# Patient Record
Sex: FEMALE | Race: WHITE | NOT HISPANIC OR LATINO | Employment: FULL TIME | URBAN - METROPOLITAN AREA
[De-identification: names, ages, dates, MRNs, and addresses within clinical notes are randomized per-mention and may not be internally consistent; named-entity substitution may affect disease eponyms.]

---

## 2017-01-03 ENCOUNTER — OFFICE VISIT (OUTPATIENT)
Dept: OBSTETRICS AND GYNECOLOGY | Facility: CLINIC | Age: 32
End: 2017-01-03
Attending: OBSTETRICS & GYNECOLOGY
Payer: COMMERCIAL

## 2017-01-03 ENCOUNTER — LAB VISIT (OUTPATIENT)
Dept: LAB | Facility: HOSPITAL | Age: 32
End: 2017-01-03
Attending: OBSTETRICS & GYNECOLOGY
Payer: COMMERCIAL

## 2017-01-03 ENCOUNTER — PROCEDURE VISIT (OUTPATIENT)
Dept: OBSTETRICS AND GYNECOLOGY | Facility: CLINIC | Age: 32
End: 2017-01-03
Payer: COMMERCIAL

## 2017-01-03 VITALS
WEIGHT: 121.38 LBS | BODY MASS INDEX: 22.34 KG/M2 | DIASTOLIC BLOOD PRESSURE: 80 MMHG | HEIGHT: 62 IN | SYSTOLIC BLOOD PRESSURE: 140 MMHG

## 2017-01-03 DIAGNOSIS — D25.9 UTERINE FIBROID COMPLICATING ANTENATAL CARE, BABY NOT YET DELIVERED, FIRST TRIMESTER: ICD-10-CM

## 2017-01-03 DIAGNOSIS — Z12.4 SCREENING FOR MALIGNANT NEOPLASM OF THE CERVIX: ICD-10-CM

## 2017-01-03 DIAGNOSIS — D24.1 FIBROADENOMA OF RIGHT BREAST: ICD-10-CM

## 2017-01-03 DIAGNOSIS — O34.11 UTERINE FIBROID COMPLICATING ANTENATAL CARE, BABY NOT YET DELIVERED, FIRST TRIMESTER: ICD-10-CM

## 2017-01-03 DIAGNOSIS — Z11.3 SCREENING FOR STD (SEXUALLY TRANSMITTED DISEASE): ICD-10-CM

## 2017-01-03 DIAGNOSIS — N91.2 ABSENT MENSES: ICD-10-CM

## 2017-01-03 DIAGNOSIS — Z01.419 ENCOUNTER FOR GYNECOLOGICAL EXAMINATION: Primary | ICD-10-CM

## 2017-01-03 DIAGNOSIS — Z34.01 ENCOUNTER FOR SUPERVISION OF NORMAL FIRST PREGNANCY IN FIRST TRIMESTER: ICD-10-CM

## 2017-01-03 PROBLEM — O34.10 UTERINE FIBROID COMPLICATING ANTENATAL CARE, BABY NOT YET DELIVERED: Status: ACTIVE | Noted: 2017-01-03

## 2017-01-03 LAB
ABO + RH BLD: NORMAL
BASOPHILS # BLD AUTO: 0.04 K/UL
BASOPHILS NFR BLD: 0.6 %
BLD GP AB SCN CELLS X3 SERPL QL: NORMAL
DIFFERENTIAL METHOD: ABNORMAL
EOSINOPHIL # BLD AUTO: 0.1 K/UL
EOSINOPHIL NFR BLD: 1.5 %
ERYTHROCYTE [DISTWIDTH] IN BLOOD BY AUTOMATED COUNT: 12.8 %
GLUCOSE SERPL-MCNC: 81 MG/DL
HCT VFR BLD AUTO: 39.2 %
HGB BLD-MCNC: 13 G/DL
LYMPHOCYTES # BLD AUTO: 1.7 K/UL
LYMPHOCYTES NFR BLD: 24.6 %
MCH RBC QN AUTO: 26.6 PG
MCHC RBC AUTO-ENTMCNC: 33.2 %
MCV RBC AUTO: 80 FL
MONOCYTES # BLD AUTO: 0.9 K/UL
MONOCYTES NFR BLD: 13.9 %
NEUTROPHILS # BLD AUTO: 4 K/UL
NEUTROPHILS NFR BLD: 59.1 %
PLATELET # BLD AUTO: 290 K/UL
PMV BLD AUTO: 10.5 FL
RBC # BLD AUTO: 4.88 M/UL
TSH SERPL DL<=0.005 MIU/L-ACNC: 0.92 UIU/ML
WBC # BLD AUTO: 6.76 K/UL

## 2017-01-03 PROCEDURE — 82947 ASSAY GLUCOSE BLOOD QUANT: CPT

## 2017-01-03 PROCEDURE — 86901 BLOOD TYPING SEROLOGIC RH(D): CPT

## 2017-01-03 PROCEDURE — 86762 RUBELLA ANTIBODY: CPT

## 2017-01-03 PROCEDURE — 76801 OB US < 14 WKS SINGLE FETUS: CPT | Mod: S$GLB,,, | Performed by: PEDIATRICS

## 2017-01-03 PROCEDURE — 86900 BLOOD TYPING SEROLOGIC ABO: CPT

## 2017-01-03 PROCEDURE — 84443 ASSAY THYROID STIM HORMONE: CPT

## 2017-01-03 PROCEDURE — 87340 HEPATITIS B SURFACE AG IA: CPT

## 2017-01-03 PROCEDURE — 99999 PR PBB SHADOW E&M-EST. PATIENT-LVL III: CPT | Mod: PBBFAC,,, | Performed by: OBSTETRICS & GYNECOLOGY

## 2017-01-03 PROCEDURE — 99395 PREV VISIT EST AGE 18-39: CPT | Mod: S$GLB,,, | Performed by: OBSTETRICS & GYNECOLOGY

## 2017-01-03 PROCEDURE — 88175 CYTOPATH C/V AUTO FLUID REDO: CPT

## 2017-01-03 PROCEDURE — 86592 SYPHILIS TEST NON-TREP QUAL: CPT

## 2017-01-03 PROCEDURE — 85025 COMPLETE CBC W/AUTO DIFF WBC: CPT

## 2017-01-03 PROCEDURE — 87591 N.GONORRHOEAE DNA AMP PROB: CPT

## 2017-01-03 PROCEDURE — 86703 HIV-1/HIV-2 1 RESULT ANTBDY: CPT

## 2017-01-03 RX ORDER — AMOXICILLIN 500 MG/1
500 CAPSULE ORAL 2 TIMES DAILY
COMMUNITY
End: 2017-01-12

## 2017-01-03 NOTE — PROCEDURES
Procedures     Indication: gestation age determination(BOOGIE WAX).   Maternal age (31 years).   ____________________________________________________________________________  History: Age: 31 years. : 1 Para: 0.  ____________________________________________________________________________  Dating:  LMP: 16 EDC: 17 GA by LMP: 7w3d  Current Scan on: 17 EDC: 17 GA by current scan: 7w4d  Best Overall Assessment: 17 EDC: 17 Assessed GA: 7w3d  The calculation of the gestational age by current scan was based on CRL.  The Best Overall Assessment is based on the LMP.  ____________________________________________________________________________  Early Pregnancy Assessment:  Biometry:  CRL 13.6 mm >95th% 7w4d (7w2d to 8w0d)  Heart activity: present. Heart rate: 162 bpm.     ____________________________________________________________________________  Maternal Structures:  Uterus: Fibroids: Fibroid 1: Size: 95 mm x 73 mm x 102 mm. Type: pedunculated fibroid. Position: posterior.    Right Ovary: normal.   Right Ovary size: 40 mm x 39 mm x 39 mm. Volume: 31.9 ml.   Corpus Luteum Right Ovary: 29 mm x 32 mm x 32 mm. Cystic.   Left Ovary: normal.   Left Ovary size: 30 mm x 12 mm x 19 mm. Volume: 3.6 ml.  ____________________________________________________________________________  Report Summary:  Impression: Single viable intrauterine pregnancy consistent with LMP dating.  Embryo grossly WNL with normal cardiac activity.    Normal  cervix and adnexae as noted above.  Large, posterior fibroid appearing pedunculated and inferior to gestational sac.      No fluid seen in cul-de-sac.     Recommendations: Fibroid may impede vaginal delivery.  Must watch closely.  Repeat ultrasound assessment as clinically indicated.  Suggest FTS and anatomic scan and follow-up of growth and fibroid q. 4 - 6 weeks.      Thanks once again for allowing us to participate in the care of your patients.  If you have  any questions concerning today's consultation feel free to contact me or one of my partners.  We can be reached at [phone] during normal business hours.  If you have a question after normal business hours, please contact Labor and Delivery [phone] and the unit secretary will page our on call physician.

## 2017-01-03 NOTE — MR AVS SNAPSHOT
[facility]  [address]  [place] [address]  Phone: [phone]  Fax: [phone]                  Olga Prater   1/3/2017 10:30 AM   Procedure visit    Description:  Female : 1985   Provider:  [facility]   Department:  [facility]           Diagnoses this Visit        Comments    Absent menses         Uterine fibroid complicating  care, baby not yet delivered, first trimester                To Do List           Future Appointments        Provider Department Dept Phone    2017 8:00 AM [facility] [id number] [facility] [phone]    2017 1:45 PM Mohan Pastrana MD [facility] [phone]    3/1/2017 9:20 AM Katie Oakley NP [facility] [phone]      Goals (5 Years of Data)     None      [facility] On Call     [facility] On Call Nurse Care Line -  Assistance  Registered nurses in the [facility] On Call Center provide clinical advisement, health education, appointment booking, and other advisory services.  Call for this free service at [phone].             Medications           Message regarding Medications     Verify the changes and/or additions to your medication regime listed below are the same as discussed with your clinician today.  If any of these changes or additions are incorrect, please notify your healthcare provider.             Verify that the below list of medications is an accurate representation of the medications you are currently taking.  If none reported, the list may be blank. If incorrect, please contact your healthcare provider. Carry this list with you in case of emergency.           Current Medications     amoxicillin (AMOXIL) 500 MG capsule Take 500 mg by mouth 2 (two) times daily.    PRENATAL VIT CALC,IRON,FOLIC (PRENATAL VITAMIN ORAL) Take by mouth.    ketoconazole (NIZORAL) 2 % cream aaa on face qd prn flare    ketoconazole (NIZORAL) 2 % shampoo  Apply topically every other day.           Clinical Reference Information           Vital Signs - Last Recorded     LMP                   11/12/2016           Allergies as of 1/3/2017     No Known Allergies      Immunizations Administered on Date of Encounter - 1/3/2017     None      Orders Placed During Today's Visit      Normal Orders This Visit    US OB/GYN Procedure (Viewpoint) - Extended List - Future

## 2017-01-03 NOTE — PROGRESS NOTES
"CC: Well woman exam    Olga Prater is a 31 y.o. female  presents for a well woman exam.  Transfer of care from Dr Santa.  +upt no c/o. No bleeding no pain no nausea.  Currently on amoxicillin for sinusitis.  bp's elevated at MD's office but normal at home    Past Medical History   Diagnosis Date    Fibroadenoma of right breast     Menarche      Age of onset 13    Migraine        Past Surgical History   Procedure Laterality Date    [place] tooth extraction      Breast biopsy       fibroadenoma       OB History    Para Term  AB SAB TAB Ectopic Multiple Living   0 0 0 0 0 0 0 0 0 0             Family History   Problem Relation Age of Onset    Diabetes Father      Pre    Hypertension Father     Skin cancer Father     Thyroid disease Mother     Hypertension Sister     Breast cancer Maternal Grandmother     Colon cancer Neg Hx     Ovarian cancer Neg Hx     Melanoma Neg Hx        Social History   Substance Use Topics    Smoking status: Never Smoker    Smokeless tobacco: Never Used    Alcohol use No      Comment: social-- not currently       Visit Vitals    BP (!) 140/80    Ht 5' 2" (1.575 m)    Wt 55 kg (121 lb 5.8 oz)    LMP 2016    BMI 22.2 kg/m2       ROS:  GENERAL: Denies weight gain or weight loss. Feeling well overall.   SKIN: Denies rash or lesions.   HEAD: Denies head injury or headache.   NODES: Denies enlarged lymph nodes.   CHEST: Denies chest pain or shortness of breath.   CARDIOVASCULAR: Denies palpitations or left sided chest pain.   ABDOMEN: No abdominal pain, constipation, diarrhea, nausea, vomiting or rectal bleeding.   URINARY: No frequency, dysuria, hematuria, or burning on urination.  REPRODUCTIVE: See HPI.   BREASTS: The patient performs breast self-examination and denies pain, lumps, or nipple discharge.   HEMATOLOGIC: No easy bruisability or excessive bleeding.  MUSCULOSKELETAL: Denies joint pain or swelling.   NEUROLOGIC: Denies syncope or " weakness.   PSYCHIATRIC: Denies depression, anxiety or mood swings.    Physical Exam:    APPEARANCE: Well nourished, well developed, in no acute distress.  AFFECT: WNL, alert and oriented x 3  SKIN: No acne or hirsutism  NECK: Neck symmetric without masses or thyromegaly  NODES: No inguinal, cervical, axillary, or femoral lymph node enlargement  CHEST: Good respiratory effect  ABDOMEN: Soft.  No tenderness or masses.  No hepatosplenomegaly.  No hernias.  BREASTS: Symmetrical, no skin changes or visible lesions.  No palpable masses, nipple discharge bilaterally. RIGHT BREAST 1 X 1CM MOBILE AT 11 OCLOCK.  PELVIC: Normal external genitalia without lesions.  Normal hair distribution.  Adequate perineal body, normal urethral meatus.  Vagina moist and well rugated without lesions or discharge.  Cervix pink, without lesions, discharge or tenderness.  No significant cystocele or rectocele.  Bimanual exam shows uterus to be normal size, regular, mobile and nontender.  Adnexa without masses or tenderness.    EXTREMITIES: No edema.    ASSESSMENT AND PLAN  1. Encounter for gynecological examination         Patient was counseled today on A.C.S. Pap guidelines and recommendations for yearly pelvic exams, mammograms and monthly self breast exams; to see her PCP for other health maintenance.     Take home bp's and keep log. Get breast ultrasound, monthly SBE.  S=D dates by LMP.  New ob counseling.  Has large 10cm posterior fibroid that can be felt on exam as well.  MFM consults.  Discussed fibroids in pregnancy, risk of SAB, PTL, malpresentation, fibroid necrosis.  Also pt inquired about myomectomy after this pregnancy.  We will follow the fibroid and depending upon it's growth will re-eval after delivery.     Return in about 1 year (around 1/3/2018).

## 2017-01-03 NOTE — MR AVS SNAPSHOT
[facility]  [address]  [place] [address]  Phone: [phone]  Fax: [phone]                  Olga Prater   1/3/2017 9:15 AM   Office Visit    Description:  Female : 1985   Provider:  Mohan Pastrana MD   Department:  [facility]           Diagnoses this Visit        Comments    Encounter for gynecological examination    -  Primary     Fibroadenoma of right breast         Elevated BP         Screening for malignant neoplasm of the cervix         Screening for STD (sexually transmitted disease)                To Do List           Goals (5 Years of Data)     None      Follow-Up and Disposition     Return in about 1 year (around 1/3/2018).      [facility] On Call     [facility] On Call Nurse Care Line -  Assistance  Registered nurses in the [facility] On Call Center provide clinical advisement, health education, appointment booking, and other advisory services.  Call for this free service at [phone].             Medications           Message regarding Medications     Verify the changes and/or additions to your medication regime listed below are the same as discussed with your clinician today.  If any of these changes or additions are incorrect, please notify your healthcare provider.        STOP taking these medications     sumatriptan (IMITREX) 100 MG tablet Take 1 tablet (100 mg total) by mouth every 2 (two) hours as needed for Migraine. No more than twice in a 24 hour period           Verify that the below list of medications is an accurate representation of the medications you are currently taking.  If none reported, the list may be blank. If incorrect, please contact your healthcare provider. Carry this list with you in case of emergency.           Current Medications     amoxicillin (AMOXIL) 500 MG capsule Take 500 mg by mouth 2 (two) times daily.    PRENATAL VIT CALC,IRON,FOLIC (PRENATAL VITAMIN ORAL) Take by mouth.    ketoconazole (NIZORAL)  "2 % cream aaa on face qd prn flare    ketoconazole (NIZORAL) 2 % shampoo Apply topically every other day.           Clinical Reference Information           Vital Signs - Last Recorded  Most recent update: 1/3/2017  9:44 AM by Martha Jeff MA    BP Ht Wt LMP BMI    (!) 140/80 5' 2" (1.575 m) 55 kg (121 lb 5.8 oz) 11/12/2016 22.2 kg/m2      Blood Pressure          Most Recent Value    BP  (!)  140/80      Allergies as of 1/3/2017     No Known Allergies      Immunizations Administered on Date of Encounter - 1/3/2017     None      Orders Placed During Today's Visit      Normal Orders This Visit    C. trachomatis/N. gonorrhoeae by AMP DNA Cervix     Liquid-based pap smear, screening     Future Labs/Procedures Expected by Expires    US Breast Right Complete  1/3/2017 3/3/2018      "

## 2017-01-04 LAB
HBV SURFACE AG SERPL QL IA: NEGATIVE
HIV 1+2 AB+HIV1 P24 AG SERPL QL IA: NEGATIVE
RPR SER QL: NORMAL
RUBV IGG SER-ACNC: 54 IU/ML
RUBV IGG SER-IMP: REACTIVE

## 2017-01-05 ENCOUNTER — TELEPHONE (OUTPATIENT)
Dept: OBSTETRICS AND GYNECOLOGY | Facility: CLINIC | Age: 32
End: 2017-01-05

## 2017-01-05 DIAGNOSIS — O34.10 UTERINE FIBROID DURING PREGNANCY, ANTEPARTUM: Primary | ICD-10-CM

## 2017-01-05 DIAGNOSIS — D25.9 UTERINE FIBROID DURING PREGNANCY, ANTEPARTUM: Primary | ICD-10-CM

## 2017-01-05 LAB
C TRACH DNA SPEC QL NAA+PROBE: NEGATIVE
N GONORRHOEA DNA SPEC QL NAA+PROBE: NEGATIVE

## 2017-01-06 ENCOUNTER — PATIENT MESSAGE (OUTPATIENT)
Dept: OBSTETRICS AND GYNECOLOGY | Facility: CLINIC | Age: 32
End: 2017-01-06

## 2017-01-06 NOTE — TELEPHONE ENCOUNTER
Call pt she needs to be at least 11wks, quote her the prices and she needs to understand that this is a GENETIC testing for chromosome abnormalities.  Finding out the sex is secondary.  We cannot just test for the sex.

## 2017-01-06 NOTE — TELEPHONE ENCOUNTER
Spoke with pt and made sure she understood that testing is for genetics, gender is just the added benefit. Informed pt of all cash pay pricing/insurance with both [facility] and [id number] tests. Pt will think about it as testing is expensive.

## 2017-01-09 ENCOUNTER — PATIENT MESSAGE (OUTPATIENT)
Dept: OBSTETRICS AND GYNECOLOGY | Facility: CLINIC | Age: 32
End: 2017-01-09

## 2017-01-09 ENCOUNTER — HOSPITAL ENCOUNTER (OUTPATIENT)
Dept: RADIOLOGY | Facility: HOSPITAL | Age: 32
Discharge: HOME | End: 2017-01-09
Attending: OBSTETRICS & GYNECOLOGY
Payer: COMMERCIAL

## 2017-01-09 ENCOUNTER — TELEPHONE (OUTPATIENT)
Dept: OBSTETRICS AND GYNECOLOGY | Facility: CLINIC | Age: 32
End: 2017-01-09

## 2017-01-09 DIAGNOSIS — D24.1 FIBROADENOMA OF RIGHT BREAST: ICD-10-CM

## 2017-01-09 PROCEDURE — 76641 ULTRASOUND BREAST COMPLETE: CPT | Mod: TC,RT

## 2017-01-09 PROCEDURE — 76641 ULTRASOUND BREAST COMPLETE: CPT | Mod: 26,RT,, | Performed by: RADIOLOGY

## 2017-01-09 NOTE — TELEPHONE ENCOUNTER
9 week OB---reports brown spotting that started this AM. [place] 3-4 days ago.  Denies cramping or pain. She has a fibroid on her uterus and has an appt with [facility] Thursday with an US.  Should she come in sooner with us for an US or just monitor?     Pt states she has been having head congestion, coughing up clear mucous, runny nose, and watery eyes.  She has been on Amoxicillin for 9 days so far, asking if she can take anything else.  Suggested mucinex and claritin or zyrtec.

## 2017-01-09 NOTE — TELEPHONE ENCOUNTER
OK to keep ultrasound appointment as scheduled; sounds normal considering recent intercourse. Agree with plan for sinus meds.

## 2017-01-09 NOTE — TELEPHONE ENCOUNTER
Dr Pastrana pt , Ob 9wks had some brown spotting and was told to call if this happens. Also she has been on amoxacillin for a sinus infection and wants to know what she can take. Pt # [phone]

## 2017-01-12 ENCOUNTER — OFFICE VISIT (OUTPATIENT)
Dept: MATERNAL FETAL MEDICINE | Facility: CLINIC | Age: 32
End: 2017-01-12
Attending: OBSTETRICS & GYNECOLOGY
Payer: COMMERCIAL

## 2017-01-12 VITALS
SYSTOLIC BLOOD PRESSURE: 138 MMHG | WEIGHT: 119.06 LBS | DIASTOLIC BLOOD PRESSURE: 78 MMHG | BODY MASS INDEX: 21.77 KG/M2

## 2017-01-12 DIAGNOSIS — O34.10 UTERINE FIBROID DURING PREGNANCY, ANTEPARTUM: ICD-10-CM

## 2017-01-12 DIAGNOSIS — Z36.9 ENCOUNTER FOR ANTENATAL SCREENING: Primary | ICD-10-CM

## 2017-01-12 DIAGNOSIS — D25.9 UTERINE FIBROID DURING PREGNANCY, ANTEPARTUM: ICD-10-CM

## 2017-01-12 PROCEDURE — 99213 OFFICE O/P EST LOW 20 MIN: CPT | Mod: 25,S$GLB,, | Performed by: OBSTETRICS & GYNECOLOGY

## 2017-01-12 PROCEDURE — 76801 OB US < 14 WKS SINGLE FETUS: CPT | Mod: 26,S$GLB,, | Performed by: OBSTETRICS & GYNECOLOGY

## 2017-01-12 PROCEDURE — 99999 PR PBB SHADOW E&M-EST. PATIENT-LVL II: CPT | Mod: PBBFAC,,, | Performed by: OBSTETRICS & GYNECOLOGY

## 2017-01-12 NOTE — LETTER
January 17, 2017      Mohan Pastrana MD  [address]  [address]  [facility] [address]           [facility]  [address]  [facility] [address]  Phone: [phone]          Patient: Olga Prater   MR Number: [id number]   YOB: 1985   Date of Visit: 1/12/2017       Dear Dr. Mohan Pastrana:    Thank you for referring Olga Prater to me for evaluation. Attached you will find relevant portions of my assessment and plan of care.    If you have questions, please do not hesitate to call me. I look forward to following Olga Prater along with you.    Sincerely,    Elena Hutchins MD    Enclosure  CC:  No Recipients    If you would like to receive this communication electronically, please contact [email or web address] or [phone] to request more information on [employer] access.    For providers and/or their staff who would like to refer a patient to [facility], please contact us through our one-stop-shop provider referral line, [facility], at [phone].    If you feel you have received this communication in error or would no longer like to receive these types of communications, please e-mail [email or web address]

## 2017-01-17 NOTE — PROGRESS NOTES
Indication: fetal evaluation and consultation  (BOOGIE VILLAFUERTE).   Maternal age (31 years).   Fibroids.   ____________________________________________________________________________  History: Age: 31 years. : 1 Para: 0.  ____________________________________________________________________________  Dating:  LMP: 16 EDC: 17 GA by LMP: 8w5d  Current Scan on: 17 EDC: 17 GA by current scan: 9w1d  Best Overall Assessment: 17 EDC: 17 Assessed GA: 8w5d  The calculation of the gestational age by current scan was based on CRL.  The Best Overall Assessment is based on the LMP.  ____________________________________________________________________________  Maternal Characteristics and History:  Maternal age 31 years. Parity (pregnancies after 23 weeks). Current maternal weight 54.0 kg  ____________________________________________________________________________  First Trimester Scan:  Allen gestation.  Biometry:  CRL 23.7 mm >95th% 9w1d (8w5d to 9w3d)  Gestational sac present. Yolk sac present. Embryo present.      Fetal heart activity: present. Fetal heart rate: 170 bpm.     ____________________________________________________________________________  Anatomy Scan:  Allen gestation.   Fetal heart activity: present. Fetal heart rate: 170 bpm.     ____________________________________________________________________________  Consultation:  Type of Consultation: fibroids  Consultant: Elena Hutchins M.D.     [[id number]]  The patient was counseled about fibroids in pregnancy.  The patient was counseled that fibroids may remain stable or increase in size.  The patient was counseled that some fibroids may undergo degeneration and cause severe abdominal pain and require hospitalization for pain control.  The patient was counseled that large fibroids may be associated with obstruction to labor or fetal malpresentation.  The patient was counseled that retroplacental fibroids may be associated with  fetal growth restriction (FGR).  I recommend that the patient have serial fetal growth ultrasounds every 4 weeks, starting at approximately 28-32 weeks.     The patient was counseled on NT screen including sensitivities, false positive rate, and logistics of the test. The patient was counseled on the relationship between maternal age and genetic aneuploidy. The patient was counseled on tests/procedure available to her if abnormal NT screen: amniocentesis, CVS,  and NIPT (ex. Materni T21). She was counseled on amniocentesis and was quoted a 1 in 500 procedure related risk of fetal loss associated with CVS and a 1 in 900 procedure related risk of fetal loss associated with the procedure. She was counseled on the fetal free cell DNA screen (ex. MAterni T21 screen) as well. Patient's questions were answered.     Patient's questions were answered    I spent about 40 minutes in consultation separate from ultrasound.  ____________________________________________________________________________  Maternal Structures:  Uterus: normal.   Fibroids: Fibroid 1: Size: 99 mm x 79 mm x 75 mm. Position: posterior.   Fibroid 2: Size: 21 mm x 22 mm x 18 mm. Position: left lateral wall.   Fibroid 3: Size: 23 mm x 22 mm x 21 mm. Position: left lateral wall.  Cervix: normal.  Right Ovary: normal.   Right Ovary size: 50 mm x 46 mm x 41 mm. Volume: 49.4 ml.   Corpus Luteum Right Ovary: 36 mm x 22 mm x 36 mm.   Left Ovary: normal. 6.  Cul de Sac / Pouch of George: no free fluid visible.  ____________________________________________________________________________  Maternal Assessment:  Followup examination.   Weight: 118 lb (54.0 kg).   Blood pressure: 158 / 80 mmHg.   Progress Notes: repeat /80 mmhg.   ____________________________________________________________________________  Report Summary:  Impression:   Single viable intrauterine pregnancy consistent with LMP dating  Embryo grossly WNL with normal cardiac activity  Normal  uterus, cervix and adnexae as noted above  No fluid seen in cul-de-sac  (3) uterine fibroids: See above for details      (1) large posterior fibroid      (2) small fibroids     see epic note for details.     Recommendations: 1. MFM will assist in scheduling patient for NT screen     2. MFM will assist patient in scheduling Fetal anatomical survey and f/u fibroids at 19-20 wks gestation

## 2017-01-31 ENCOUNTER — ROUTINE PRENATAL (OUTPATIENT)
Dept: OBSTETRICS AND GYNECOLOGY | Facility: CLINIC | Age: 32
End: 2017-01-31
Payer: COMMERCIAL

## 2017-01-31 VITALS
SYSTOLIC BLOOD PRESSURE: 132 MMHG | DIASTOLIC BLOOD PRESSURE: 70 MMHG | BODY MASS INDEX: 21.94 KG/M2 | WEIGHT: 119.94 LBS

## 2017-01-31 DIAGNOSIS — Z34.01 ENCOUNTER FOR SUPERVISION OF NORMAL FIRST PREGNANCY IN FIRST TRIMESTER: Primary | ICD-10-CM

## 2017-01-31 PROCEDURE — 99999 PR PBB SHADOW E&M-EST. PATIENT-LVL II: CPT | Mod: PBBFAC,,, | Performed by: OBSTETRICS & GYNECOLOGY

## 2017-01-31 PROCEDURE — 0502F SUBSEQUENT PRENATAL CARE: CPT | Mod: S$GLB,,, | Performed by: OBSTETRICS & GYNECOLOGY

## 2017-01-31 NOTE — PROGRESS NOTES
Had some brown spotting a few weeks ago, reassurance has + blood type.  Last ultrasound was fine.  Getting NT with MFM.

## 2017-02-01 ENCOUNTER — TELEPHONE (OUTPATIENT)
Dept: OBSTETRICS AND GYNECOLOGY | Facility: CLINIC | Age: 32
End: 2017-02-01

## 2017-02-01 ENCOUNTER — PROCEDURE VISIT (OUTPATIENT)
Dept: OBSTETRICS AND GYNECOLOGY | Facility: CLINIC | Age: 32
End: 2017-02-01
Attending: OBSTETRICS & GYNECOLOGY
Payer: COMMERCIAL

## 2017-02-01 DIAGNOSIS — O46.92 VAGINAL BLEEDING IN PREGNANCY, SECOND TRIMESTER: ICD-10-CM

## 2017-02-01 DIAGNOSIS — O20.9 VAGINAL BLEEDING IN PREGNANCY, FIRST TRIMESTER: Primary | ICD-10-CM

## 2017-02-01 DIAGNOSIS — D25.9 UTERINE FIBROID COMPLICATING ANTENATAL CARE, BABY NOT YET DELIVERED, FIRST TRIMESTER: ICD-10-CM

## 2017-02-01 DIAGNOSIS — O46.92 VAGINAL BLEEDING IN PREGNANCY, SECOND TRIMESTER: Primary | ICD-10-CM

## 2017-02-01 DIAGNOSIS — O34.11 UTERINE FIBROID COMPLICATING ANTENATAL CARE, BABY NOT YET DELIVERED, FIRST TRIMESTER: ICD-10-CM

## 2017-02-01 PROCEDURE — 0502F SUBSEQUENT PRENATAL CARE: CPT | Mod: S$GLB,,, | Performed by: OBSTETRICS & GYNECOLOGY

## 2017-02-01 PROCEDURE — 76801 OB US < 14 WKS SINGLE FETUS: CPT | Mod: S$GLB,,, | Performed by: OBSTETRICS & GYNECOLOGY

## 2017-02-01 RX ORDER — PROGESTERONE 200 MG/1
200 CAPSULE ORAL NIGHTLY
Qty: 30 CAPSULE | Refills: 3 | Status: SHIPPED | OUTPATIENT
Start: 2017-02-01 | End: 2017-03-23

## 2017-02-01 NOTE — TELEPHONE ENCOUNTER
Wax ob pt - pt is 12 weeks and is having vaginal bleeding. She said it's more like spotting and it is a reddish color.

## 2017-02-01 NOTE — MR AVS SNAPSHOT
[facility]  [address]  [address]  [facility] [address]  Phone: [phone]  Fax: [phone]                  Olga Prater   2017 1:40 PM   Procedure visit    Description:  Female : 1985   Provider:  [facility]   [facility]  [facility]           Diagnoses this Visit        Comments    Vaginal bleeding in pregnancy, second trimester         Uterine fibroid complicating  care, baby not yet delivered, first trimester                To Do List           Future Appointments        Provider Department Dept Phone    2017 2:30 PM Gertrudis Reyes MD [facility] [phone]    2017 3:00 PM Elena Hutchins MD [facility] Maternal Fetal Med [phone]    3/1/2017 9:20 AM Katie Oakley NP [facility] [phone]      Goals (5 Years of Data)     None      [facility] On Call     [facility] On Call Nurse Care Line - 24/7 Assistance  Registered nurses in the [facility] On Call Center provide clinical advisement, health education, appointment booking, and other advisory services.  Call for this free service at [phone].             Medications           Message regarding Medications     Verify the changes and/or additions to your medication regime listed below are the same as discussed with your clinician today.  If any of these changes or additions are incorrect, please notify your healthcare provider.             Verify that the below list of medications is an accurate representation of the medications you are currently taking.  If none reported, the list may be blank. If incorrect, please contact your healthcare provider. Carry this list with you in case of emergency.           Current Medications     PRENATAL VIT CALC,IRON,FOLIC (PRENATAL VITAMIN ORAL) Take by mouth.           Clinical Reference Information           Prenatal Vitals     Enc. Date GA Prenatal Vitals Prenatal Pulse Pain Level Urine  Albumin/Glucose Edema Presentation Dilation/Effacement/Station    1/31/17 11w3d 132/70 / 54.4 kg (119 lb 14.9 oz)  / 170 / Absent  0 Negative / Negative None / None / None / No      1/12/17 8w5d 138/78           1/12/17 8w5d 158/80 (A) / 54 kg (119 lb 0.8 oz)             Vital Signs - Last Recorded     LMP                   11/12/2016           Allergies as of 2/1/2017     No Known Allergies      Immunizations Administered on Date of Encounter - 2/1/2017     None      Orders Placed During Today's Visit      Normal Orders This Visit    US OB/GYN Procedure ([facility]point) - Extended List

## 2017-02-01 NOTE — PROCEDURES
Procedures   Obstetrical ultrasound completed today.  See report in imaging section of [facility].

## 2017-02-01 NOTE — TELEPHONE ENCOUNTER
11 4/7 week OB c/o spotting.  Denies cramping and pain.  Denies intercourse and straining with a BM. Scheduled US and appt with Dr. Reyes at 1340.  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

## 2017-02-01 NOTE — PROGRESS NOTES
Called today with complaints of bright red bleeding. Went through a wad of toilet paper. 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 Told to come now for u/s. U/S shows SLIUP. Large 10 cm posterior fibroid. You can see an area of bleeding/ subchorionic hemorrhage around gestational sac. On speculum exam, cervix is displaced by fibroid. Large palpable fibroid posteriorly. No active bleeding vaginally. Small amount of blood in vault. I rec no work for 1 week. Prometrium orally QHS. F/u in 1 weeks with MFM which was already scheduled. Precautions explained. All questions answered. Based on size of fibroid now and how it displaces cervix I would be curious if vaginal delivery would be possible. Will await exam at the end of pregnancy.

## 2017-02-01 NOTE — MR AVS SNAPSHOT
[facility]  [address]  [address]  [facility] [address]  Phone: [phone]  Fax: [phone]                  Olga Prater   2017 2:30 PM   Appointment    Description:  Female : 1985   Provider:  Gertrudis Reyes MD   [facility]  [facility]                To Do List           Future Appointments        Provider Department Dept Phone    2017 2:30 PM [facility] [phone]    2017 3:00 PM Elena Hutchins MD [facility] Maternal Fetal Med [phone]    3/1/2017 9:20 AM Katie Oakley NP [facility] [phone]      Goals (5 Years of Data)     None      [facility]     [facility] On Call Nurse Care Line -  Assistance  Registered nurses in the [facility] On Call Center provide clinical advisement, health education, appointment booking, and other advisory services.  Call for this free service at [phone].             Medications           Message regarding Medications     Verify the changes and/or additions to your medication regime listed below are the same as discussed with your clinician today.  If any of these changes or additions are incorrect, please notify your healthcare provider.             Verify that the below list of medications is an accurate representation of the medications you are currently taking.  If none reported, the list may be blank. If incorrect, please contact your healthcare provider. Carry this list with you in case of emergency.           Current Medications     PRENATAL VIT CALC,IRON,FOLIC (PRENATAL VITAMIN ORAL) Take by mouth.           Clinical Reference Information           Prenatal Vitals     Enc. Date GA Prenatal Vitals Prenatal Pulse Pain Level Urine Albumin/Glucose Edema Presentation Dilation/Effacement/Station    17 11w3d 132/70 / 54.4 kg (119 lb 14.9 oz)  / 170 / Absent  0 Negative / Negative None / None / None / No      17 8w5d 138/78            1/12/17 8w5d 158/80 (A) / 54 kg (119 lb 0.8 oz)             Vital Signs - Last Recorded     LMP                   11/12/2016           Allergies as of 2/1/2017     No Known Allergies      Immunizations Administered on Date of Encounter - 2/1/2017     None

## 2017-02-01 NOTE — Clinical Note
fyi- had bleeding. Subchorionic hemorrhage. Put her out of work til appt next week and started progesterone.

## 2017-02-02 ENCOUNTER — TELEPHONE (OUTPATIENT)
Dept: MATERNAL FETAL MEDICINE | Facility: CLINIC | Age: 32
End: 2017-02-02

## 2017-02-02 NOTE — TELEPHONE ENCOUNTER
----- Message from Tanvi Sol sent at 2/2/2017  8:20 AM CST -----  Contact: self  Pt called to speak with Dr Hutchins nurse due to light bleeding. Pt saw Dr Reyes yesterday and was let go. Patient also would llike to come in earlier for her NT which is scheduled for 2/9/17. Pt can be reached at [phone].      Patient to call 's office first then if we need to see her sooner will.  Patient verbalized her understanding.

## 2017-02-03 ENCOUNTER — TELEPHONE (OUTPATIENT)
Dept: OBSTETRICS AND GYNECOLOGY | Facility: CLINIC | Age: 32
End: 2017-02-03

## 2017-02-03 DIAGNOSIS — O20.9 BLEEDING IN EARLY PREGNANCY: Primary | ICD-10-CM

## 2017-02-03 NOTE — TELEPHONE ENCOUNTER
Dr Pastrana pt calling, Ob 12wks came in yesterday had an u/s was told she had a Subchronic hemorrhage. This started on Wednesday with bleeding,then she started spotting brown now pt is at home and started bleeding again bright red blood.Pt isn't sure if she is suppose to continue bleeding or what to do. Pt # [phone]

## 2017-02-03 NOTE — TELEPHONE ENCOUNTER
Spoke to pt since now brown and no pain ok to wait or can go to ED. Precautions given.  Martha put with NP Monday with TIN Suero in future for any of my ob's who call and are bleeding just put them right in same day with ultrasound with NP and send me a message to let me know.  She wanted to come in but I'm just seeing message now.

## 2017-02-03 NOTE — TELEPHONE ENCOUNTER
11 3/7 week OB  Was told she has a subchronic hemorrhage when they did the ultrasound.  She had brown spotting all day Wednesday and Thursday. Today around lunch she felt something and had a 3-4in ring of bright red bleeding in her underwear.  Reassured her that she should expect bloody discharge since there were several pocks seen on US.  Advised to continue to monitor but if it becomes like the flow of a period to go to ER over the weekend.

## 2017-02-06 ENCOUNTER — ROUTINE PRENATAL (OUTPATIENT)
Dept: OBSTETRICS AND GYNECOLOGY | Facility: CLINIC | Age: 32
End: 2017-02-06
Payer: COMMERCIAL

## 2017-02-06 ENCOUNTER — PROCEDURE VISIT (OUTPATIENT)
Dept: OBSTETRICS AND GYNECOLOGY | Facility: CLINIC | Age: 32
End: 2017-02-06
Attending: OBSTETRICS & GYNECOLOGY
Payer: COMMERCIAL

## 2017-02-06 VITALS — WEIGHT: 119.69 LBS | BODY MASS INDEX: 21.9 KG/M2 | SYSTOLIC BLOOD PRESSURE: 130 MMHG | DIASTOLIC BLOOD PRESSURE: 72 MMHG

## 2017-02-06 DIAGNOSIS — O26.859 SPOTTING AFFECTING PREGNANCY, ANTEPARTUM: Primary | ICD-10-CM

## 2017-02-06 DIAGNOSIS — Z3A.12 12 WEEKS GESTATION OF PREGNANCY: ICD-10-CM

## 2017-02-06 DIAGNOSIS — O20.9 BLEEDING IN EARLY PREGNANCY: ICD-10-CM

## 2017-02-06 PROCEDURE — 99999 PR PBB SHADOW E&M-EST. PATIENT-LVL II: CPT | Mod: PBBFAC,,, | Performed by: NURSE PRACTITIONER

## 2017-02-06 PROCEDURE — 0502F SUBSEQUENT PRENATAL CARE: CPT | Mod: S$GLB,,, | Performed by: NURSE PRACTITIONER

## 2017-02-06 PROCEDURE — 76801 OB US < 14 WKS SINGLE FETUS: CPT | Mod: S$GLB,,, | Performed by: PEDIATRICS

## 2017-02-06 NOTE — PROCEDURES
Procedures     Indication:     bleeding (BOOGIE WAX).   Maternal age (31 years).   ____________________________________________________________________________  History:     Age: 31 years. : 1 Para: 0.  ____________________________________________________________________________  Dating:    LMP: 16 EDC: 17 GA by LMP: 12w2d  Current Scan on: 17 EDC: 17 GA by current scan: 12w3d      Best Overall Assessment: 17 EDC: 17 Assessed GA: 12w2d  The calculation of the gestational age by current scan was based on CRL.  The Best Overall Assessment is based on the LMP.  ____________________________________________________________________________  First Trimester Scan:  Allen gestation.      Biometry:    CRL 59.9 mm 51st% 12w3d (12w0d to 13w0d)      Fetal Anatomy:    Skull / Brain: appears normal. Stomach: visible. Hands: both visible. Feet: both visible.        Fetal heart activity: present. Fetal heart rate: 164 bpm.   Amniotic fluid: normal.     Summary of Ultrasound Findings:  Transabdominal US. U/S machine: [id number] [place] 520. U/S view: limited by early gestational age.       Impression:     hypoechoioc area adjacent to gestational sac measuring 46 x 27 x 24mm.   Comments: Size agrees with dates.     ____________________________________________________________________________  Maternal Structures:    Uterus: normal.   Fibroids: Fibroid 1: Size: 99 mm x 94 mm x 94 mm. Position: posterior. Method visualized: lower uterine segment.  Cervix: normal.  Right Ovary: normal.   Right Ovary size: 52 mm x 41 mm x 39 mm. Volume: 43.5 ml.   Corpus Luteum Right Ovary: 32 mm x 30 mm x 31 mm.   Left Ovary: normal.   Left Ovary size: 35 mm x 19 mm x 23 mm. Volume: 8.0 ml.  Cul de Sac / Pouch of George: no free fluid visible.  ____________________________________________________________________________  Report Summary:    Impression:     Single viable intrauterine pregnancy consistent with  established dating.  Fetus grossly WNL with normal cardiac activity.  Hypoechoic area as noted.    No evidence of nuchal translucency thickening visualized today.  Normal uterus, cervix and adnexae as noted above.     Recommendations:     Suggest repeat scan as you feel clinically indicated.     Thank you for allowing us to participate in the care of your patients.  If you have any questions concerning today's consultation, please feel free to contact me or one of my partners.  We can be reached at [phone] during normal business hours.  If you have a question after normal business hours, please contact Labor and Delivery [phone] and the unit secretary will page our on call physician.

## 2017-02-06 NOTE — TELEPHONE ENCOUNTER
Pt said brown spotting has decreased over the weekend but wants to make sure everything is okay now and not wait until Thursday appt with MFM. Added to scheduled this am with Katie and u/s

## 2017-02-09 ENCOUNTER — OFFICE VISIT (OUTPATIENT)
Dept: MATERNAL FETAL MEDICINE | Facility: CLINIC | Age: 32
End: 2017-02-09
Attending: OBSTETRICS & GYNECOLOGY
Payer: COMMERCIAL

## 2017-02-09 ENCOUNTER — LAB VISIT (OUTPATIENT)
Dept: LAB | Facility: OTHER | Age: 32
End: 2017-02-09
Attending: OBSTETRICS & GYNECOLOGY
Payer: COMMERCIAL

## 2017-02-09 VITALS — BODY MASS INDEX: 21.73 KG/M2 | WEIGHT: 118.81 LBS

## 2017-02-09 DIAGNOSIS — O34.10 UTERINE FIBROID DURING PREGNANCY, ANTEPARTUM: ICD-10-CM

## 2017-02-09 DIAGNOSIS — Z36.89 ENCOUNTER FOR FETAL ANATOMIC SURVEY: Primary | ICD-10-CM

## 2017-02-09 DIAGNOSIS — Z36.9 ENCOUNTER FOR ANTENATAL SCREENING: ICD-10-CM

## 2017-02-09 DIAGNOSIS — Z36.82 ENCOUNTER FOR NUCHAL TRANSLUCENCY TESTING: ICD-10-CM

## 2017-02-09 DIAGNOSIS — D25.9 UTERINE FIBROID DURING PREGNANCY, ANTEPARTUM: ICD-10-CM

## 2017-02-09 PROCEDURE — 81508 FTL CGEN ABNOR TWO PROTEINS: CPT

## 2017-02-09 PROCEDURE — 76813 OB US NUCHAL MEAS 1 GEST: CPT | Mod: S$GLB,,, | Performed by: PEDIATRICS

## 2017-02-09 PROCEDURE — 99999 PR PBB SHADOW E&M-EST. PATIENT-LVL II: CPT | Mod: PBBFAC,,, | Performed by: OBSTETRICS & GYNECOLOGY

## 2017-02-09 PROCEDURE — 76801 OB US < 14 WKS SINGLE FETUS: CPT | Mod: S$GLB,,, | Performed by: PEDIATRICS

## 2017-02-09 PROCEDURE — 36415 COLL VENOUS BLD VENIPUNCTURE: CPT

## 2017-02-09 PROCEDURE — 99499 UNLISTED E&M SERVICE: CPT | Mod: S$GLB,,, | Performed by: OBSTETRICS & GYNECOLOGY

## 2017-02-10 NOTE — PROGRESS NOTES
Indication: Nuchal Translucency.   Maternal age (31 years).   ____________________________________________________________________________  History: Age: 31 years. : 1 Para: 0.  ____________________________________________________________________________  Dating:    LMP: 16 EDC: 17 GA by LMP: 12w5d  Current Scan on: 17 EDC: 08/15/17 GA by current scan: 13w2d      Best Overall Assessment: 17 EDC: 17 Assessed GA: 12w5d  The calculation of the gestational age by current scan was based on CRL.  The Best Overall Assessment is based on the LMP.  ____________________________________________________________________________  Maternal Characteristics and History:    Maternal age 31 years. Parity (pregnancies after 23 weeks). Current maternal weight 53.9 kg  ____________________________________________________________________________  General Evaluation:    Fetal heart activity: present. Fetal heart rate: 161 bpm.   Fetal movement: visible.     ____________________________________________________________________________  First Trimester Scan:    Allen gestation.    Biometry:    CRL 71.5 mm 84th% 13w2d (12w6d to 13w6d)  NT 1.23 mm  Fetal Anatomy:  Skull / Brain: appears normal. Abdomen: appears normal. Stomach: visible. Bladder: not visible. Hands: both visible. Feet: both visible.        Summary of Ultrasound Findings:    Transabdominal US. U/S machine: [employer].     Impression: normal intrauterine pregnancy.     ____________________________________________________________________________  Maternal Structures:    Uterus: Fibroids: Fibroid 1: Size: 46 mm x 28 mm x 35 mm. Method visualized: left-pedunc.   Fibroid 2: Size: 103 mm x 96 mm x 96 mm. Method visualized: posterior.    Right Ovary:     Right Ovary size: 52 mm x 41 mm x 34 mm. Volume: 38.0 ml.   Corpus Luteum Right Ovary: 31 mm x 25 mm x 29 mm.   Left Ovary:     Left Ovary size: 25 mm x 20 mm x 11 mm. Volume: 2.9 ml.  Cul de  Sac / Pouch of George: no free fluid visible.    ____________________________________________________________________________  Maternal Assessment:    Followup examination.   Weight: 118 lb (53.9 kg).   ____________________________________________________________________________  Report Summary:      Impression:     Single viable intrauterine pregnancy consistent with established dating.  Fetus grossly WNL with normal cardiac activity.    No evidence of nuchal translucency thickening visualized today.  Normal uterus, cervix and adnexae as noted above.    No fluid seen in cul-de-sac.     Recommendations:     First portion of sequential screening completed today. Results to be sent to primary pregnancy care provider. Recommend to complete second blood draw beyond 15 weeks EGA of pregnancy. Ultrasound for fetal anatomical survey scheduled by [facility] today for 19-20 weeks EGA.     Thank you for allowing us to participate in the care of your patients.  If you have any questions concerning today's consultation, please feel free to contact me or one of my partners.  We can be reached at [phone] during normal business hours.  If you have a question after normal business hours, please contact Labor and Delivery [phone] and the unit secretary will page our on call physician.

## 2017-02-13 LAB
B-HCG (M.O.M.) (SS1): NORMAL
CRL MEASUREMENT (SS1): 71.5 MM
DOWN SYNDROME (<1:25) (SS1): NORMAL
DS BASED ON MAT. AGE (SS1): NORMAL
ETHNIC ORIGIN (SS1): NORMAL
GESTATIONAL AGE (DAYS)(SS1): 2
GESTATIONAL AGE (WEEKS)(SS1): 13
HCG (SS1): 93.4 IU/ML
INSULIN DEPEND. DIABETES (SS1): NORMAL
MATERNAL AGE AT EDD (YRS) (SS1): 31
MATERNAL WEIGHT (LBS) (SS1): 119
MULTIPLE GESTATION (SS1): NORMAL
NASAL BONE (SS1): NORMAL
NUCHAL TRANSL. (M.O.M.) (SS1): NORMAL
NUCHAL TRANSLUCENCY (SS1): 1.5 MM
PAPP-A (M.O.M.) (SS1): NORMAL
PAPP-A (SS1): 713.9 NG/ML
SEQ. SCREEN PART 1: NEGATIVE
SEQUENTIAL SCREEN I INTERP.: NORMAL
TRISOMY 18 (<1:100) (SS1): NORMAL
ULTRASOUND DATE (SS1): NORMAL

## 2017-03-01 ENCOUNTER — ROUTINE PRENATAL (OUTPATIENT)
Dept: OBSTETRICS AND GYNECOLOGY | Facility: CLINIC | Age: 32
End: 2017-03-01
Payer: COMMERCIAL

## 2017-03-01 ENCOUNTER — LAB VISIT (OUTPATIENT)
Dept: LAB | Facility: HOSPITAL | Age: 32
End: 2017-03-01
Payer: COMMERCIAL

## 2017-03-01 VITALS
BODY MASS INDEX: 22.58 KG/M2 | WEIGHT: 123.44 LBS | DIASTOLIC BLOOD PRESSURE: 76 MMHG | SYSTOLIC BLOOD PRESSURE: 112 MMHG

## 2017-03-01 DIAGNOSIS — Z34.02 ENCOUNTER FOR SUPERVISION OF NORMAL FIRST PREGNANCY IN SECOND TRIMESTER: ICD-10-CM

## 2017-03-01 DIAGNOSIS — Z3A.15 15 WEEKS GESTATION OF PREGNANCY: ICD-10-CM

## 2017-03-01 DIAGNOSIS — Z13.79 GENETIC SCREENING: ICD-10-CM

## 2017-03-01 DIAGNOSIS — Z13.79 GENETIC SCREENING: Primary | ICD-10-CM

## 2017-03-01 PROCEDURE — 0502F SUBSEQUENT PRENATAL CARE: CPT | Mod: S$GLB,,, | Performed by: NURSE PRACTITIONER

## 2017-03-01 PROCEDURE — 99999 PR PBB SHADOW E&M-EST. PATIENT-LVL II: CPT | Mod: PBBFAC,,, | Performed by: NURSE PRACTITIONER

## 2017-03-01 PROCEDURE — 81511 FTL CGEN ABNOR FOUR ANAL: CPT

## 2017-03-01 RX ORDER — AMOXICILLIN 875 MG/1
TABLET, FILM COATED ORAL
Refills: 0 | COMMUNITY
Start: 2016-12-31 | End: 2017-03-01

## 2017-03-01 NOTE — PROGRESS NOTES
Doing well & without complaints.  Denies vaginal spotting/bleeding.  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  Second part Sequential screen collected today.  Bleeding/cramping prec given.

## 2017-03-01 NOTE — MR AVS SNAPSHOT
[facility]  [address]  [place] [address]  Phone: [phone]  Fax: [phone]                  Olga Prater   3/1/2017 9:20 AM   Routine Prenatal    Description:  Female : 1985   Provider:  Katie Oakley NP   Department:  [facility]           Reason for Visit     Routine Prenatal Visit           Diagnoses this Visit        Comments    Genetic screening    -  Primary     15 weeks gestation of pregnancy         Encounter for supervision of normal first pregnancy in second trimester                To Do List           Future Appointments        Provider Department Dept Phone    3/30/2017 2:20 PM ULTRASOUND, [facility] Maternal Fetal Med [phone]    3/30/2017 3:30 PM Mohan Pastrana MD [facility] [phone]    2017 9:15 AM Mohan Pastrana MD [facility] [phone]    2017 9:45 AM Mohan Pastrana MD [facility] [phone]    2017 9:15 AM Mohan Pastrana MD [facility] [phone]      Goals (5 Years of Data)     None      Follow-Up and Disposition     Return in about 4 weeks (around 3/29/2017) for Routine OB visit.    Follow-up and Disposition History      [facility]     [facility] Care [facility] -  Assistance  Registered nurses in the [facility] provide clinical advisement, health education, appointment booking, and other advisory services.  Call for this free service at [phone].             Medications           Message regarding Medications     Verify the changes and/or additions to your medication regime listed below are the same as discussed with your clinician today.  If any of these changes or additions are incorrect, please notify your healthcare provider.        STOP taking these medications     amoxicillin (AMOXIL) 875 MG tablet TAKE BY MOUTH 1 TABLET EVERY 12 HOURS FOR 10 DAYS           Verify that the below list of  medications is an accurate representation of the medications you are currently taking.  If none reported, the list may be blank. If incorrect, please contact your healthcare provider. Carry this list with you in case of emergency.           Current Medications     PRENATAL VIT CALC,IRON,FOLIC (PRENATAL VITAMIN ORAL) Take by mouth.    progesterone (PROMETRIUM) 200 MG capsule Take 1 capsule (200 mg total) by mouth nightly.           Clinical Reference Information           Prenatal Vitals     Enc. Date GA Prenatal Vitals Prenatal Pulse Pain Level Urine Albumin/Glucose Edema Presentation Dilation/Effacement/Station    3/1/17 15w4d 112/76 / 56 kg (123 lb 7.3 oz)  / 138 / Absent  0 Negative / Negative None / None / None / No      2/9/17 12w5d  / 53.9 kg (118 lb 13.3 oz)           2/6/17 12w2d 130/72 / 54.3 kg (119 lb 11.4 oz)  / 164 / Absent  0 Negative / Negative       1/31/17 11w3d 132/70 / 54.4 kg (119 lb 14.9 oz)  / 170 / Absent  0 Negative / Negative None / None / None / No      1/12/17 8w5d 138/78           1/12/17 8w5d 158/80 (A) / 54 kg (119 lb 0.8 oz)             Your Vitals Were     BP                   112/76           Allergies as of 3/1/2017     No Known Allergies      Immunizations Administered on Date of Encounter - 3/1/2017     None      Orders Placed During Today's Visit     Future Labs/Procedures Expected by Expires    Maternal Sequential Screen Part 2  3/1/2017 4/30/2018      Language Assistance Services     ATTENTION: Language assistance services are available, free of charge. Please call [phone].      ATENCIÓN: Si habla español, tiene a black disposición servicios gratuitos de asistencia lingüística. Llame al [phone].     CHÚ Ý: N?u b?n nói Ti?ng Vi?t, có các d?ch v? h? tr? ngôn ng? mi?n phí dành cho b?n. G?i s? [phone].         [facility] complies with applicable Federal civil rights laws and does not discriminate on the basis of race, color, national origin, age,  disability, or sex.

## 2017-03-03 LAB
1ST SAMPLE DATE (SS2): NORMAL
2ND SAMPLE DATE (SS2): NORMAL
ALPHA FETOPROTEIN MATERNAL (SS2): 71.6 NG/ML
DOWN SYNDROME RISK (<1:110) (SS2): NORMAL
ETHNIC ORIGIN (SS2): NORMAL
GEST. AGE (DAYS) 1ST SAMPLE (SS2): 2
GEST. AGE (DAYS) 2ND SAMPLE (SS2): 1
GEST. AGE (WKS) 1ST SAMPLE (SS2): 13
GEST. AGE (WKS) 2ND SAMPLE (SS2): 16
GESTATIONAL AGE METHOD (SS2): NORMAL
HCG (SS2): 54.5 IU/ML
INHIBIN A (SS2): 287.4 PG/ML
INSULIN DEPEND. DIABETES (SS2): NORMAL
M.O.M. AFP  (<2.20) (SS2): 1.93
M.O.M. HCG (SS2): 1.36
M.O.M. INHIBIN A (SS2): 1.57
M.O.M. NUCHAL TRANSLUCENCY (SS2): 0.87
M.O.M. PAPP-A (SS2): 0.48
M.O.M. UNCONJ. ESTRIOL (SS2): 0.66
MATERNAL AGE AT EDD (YRS) (SS2): 31
MATERNAL AGE FOR DOWN (SS2): NORMAL
MATERNAL WEIGHT (LBS) (SS2): 123
MULTIPLE GESTATION (SS2): NORMAL
NASAL BONE (SS2): NORMAL
NUCHAL TRANSLUCENCY (SS2): 1.5 MM
PAPP-A (SS2): 713.9 NG/ML
SEQUENTIAL SCREEN PART 2 INTERP: NORMAL
SEQUENTIAL SCREEN PART 2: NEGATIVE
TRISOMY 18 (<1:100) (SS2): NORMAL
UNCONJUGATED ESTRIOL (SS2): 0.61 NG/ML

## 2017-03-10 ENCOUNTER — TELEPHONE (OUTPATIENT)
Dept: OBSTETRICS AND GYNECOLOGY | Facility: CLINIC | Age: 32
End: 2017-03-10

## 2017-03-10 ENCOUNTER — PROCEDURE VISIT (OUTPATIENT)
Dept: OBSTETRICS AND GYNECOLOGY | Facility: CLINIC | Age: 32
End: 2017-03-10
Attending: OBSTETRICS & GYNECOLOGY
Payer: COMMERCIAL

## 2017-03-10 VITALS — SYSTOLIC BLOOD PRESSURE: 120 MMHG | WEIGHT: 121.94 LBS | DIASTOLIC BLOOD PRESSURE: 78 MMHG | BODY MASS INDEX: 22.3 KG/M2

## 2017-03-10 DIAGNOSIS — R10.9 CRAMPING AFFECTING PREGNANCY, ANTEPARTUM: ICD-10-CM

## 2017-03-10 DIAGNOSIS — Z34.02 ENCOUNTER FOR SUPERVISION OF NORMAL FIRST PREGNANCY IN SECOND TRIMESTER: ICD-10-CM

## 2017-03-10 DIAGNOSIS — D25.9 UTERINE LEIOMYOMA, UNSPECIFIED LOCATION: ICD-10-CM

## 2017-03-10 DIAGNOSIS — O26.899 CRAMPING AFFECTING PREGNANCY, ANTEPARTUM: ICD-10-CM

## 2017-03-10 DIAGNOSIS — O26.899 CRAMPING AFFECTING PREGNANCY, ANTEPARTUM: Primary | ICD-10-CM

## 2017-03-10 DIAGNOSIS — R10.9 CRAMPING AFFECTING PREGNANCY, ANTEPARTUM: Primary | ICD-10-CM

## 2017-03-10 LAB
BILIRUB SERPL-MCNC: NORMAL MG/DL
BLOOD URINE, POC: NORMAL
COLOR, POC UA: NORMAL
GLUCOSE UR QL STRIP: NORMAL
KETONES UR QL STRIP: NORMAL
LEUKOCYTE ESTERASE URINE, POC: NORMAL
NITRITE, POC UA: NORMAL
PH, POC UA: NORMAL
PROTEIN, POC: NORMAL
SPECIFIC GRAVITY, POC UA: 7
UROBILINOGEN, POC UA: NORMAL

## 2017-03-10 PROCEDURE — 76817 TRANSVAGINAL US OBSTETRIC: CPT | Mod: S$GLB,,, | Performed by: PEDIATRICS

## 2017-03-10 PROCEDURE — 87086 URINE CULTURE/COLONY COUNT: CPT

## 2017-03-10 PROCEDURE — 76815 OB US LIMITED FETUS(S): CPT | Mod: S$GLB,,, | Performed by: PEDIATRICS

## 2017-03-10 PROCEDURE — 99999 PR PBB SHADOW E&M-EST. PATIENT-LVL II: CPT | Mod: PBBFAC,,, | Performed by: OBSTETRICS & GYNECOLOGY

## 2017-03-10 PROCEDURE — 81002 URINALYSIS NONAUTO W/O SCOPE: CPT | Mod: S$GLB,,, | Performed by: OBSTETRICS & GYNECOLOGY

## 2017-03-10 PROCEDURE — 0502F SUBSEQUENT PRENATAL CARE: CPT | Mod: S$GLB,,, | Performed by: OBSTETRICS & GYNECOLOGY

## 2017-03-10 RX ORDER — OXYCODONE AND ACETAMINOPHEN 5; 325 MG/1; MG/1
2 TABLET ORAL EVERY 4 HOURS PRN
Qty: 30 TABLET | Refills: 0 | Status: ON HOLD | OUTPATIENT
Start: 2017-03-10 | End: 2017-03-15 | Stop reason: HOSPADM

## 2017-03-10 RX ORDER — INDOMETHACIN 50 MG/1
50 CAPSULE ORAL
Qty: 12 CAPSULE | Refills: 0 | Status: ON HOLD | OUTPATIENT
Start: 2017-03-10 | End: 2017-03-15

## 2017-03-10 RX ORDER — NITROFURANTOIN 25; 75 MG/1; MG/1
100 CAPSULE ORAL 2 TIMES DAILY
Qty: 14 CAPSULE | Refills: 0 | Status: SHIPPED | OUTPATIENT
Start: 2017-03-10 | End: 2017-03-17

## 2017-03-10 RX ORDER — PROMETHAZINE HYDROCHLORIDE 25 MG/1
25 TABLET ORAL EVERY 4 HOURS
Qty: 20 TABLET | Refills: 0 | Status: SHIPPED | OUTPATIENT
Start: 2017-03-10 | End: 2017-03-23

## 2017-03-10 NOTE — TELEPHONE ENCOUNTER
16 6/7 week OB  C/o constant cramping in stomach since 0500.  She drank 4 bottles of water and still no relief.  She reports a lot of walking yesterday.  Scheduled for US at 2:00,  US was booked at 1 and 130.  Will see Dr. Pastrana after.

## 2017-03-10 NOTE — TELEPHONE ENCOUNTER
Wax ob pt - pt is 18 weeks and has been having constant cramping since 5am this morning. She said she doesn't have any bleeding or discharge. Pt said she had a subchronic hemorrhage a few weeks ago but has stopped bleeding from that.

## 2017-03-10 NOTE — PROGRESS NOTES
Has pelvic pressure when she moves certain positions but since 5am today having intense cramping, states it's a 3 of 10.  No LOF no VB, good FM.   Ultrasound today fibroid no change, unable to measure CL because fibroid completely in pelvis.  SVE very tender on fibroid and cx LTC.   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  Indocin 50mg po q6hr x72hr, percocet prn, phenergan prn.  UAC&S start macrobid until get urine cx to be safe bc having urgency and frequency.  Reassurance, po hydrate, get off feet and if worsens come back.

## 2017-03-10 NOTE — PROCEDURES
Procedures     Indication  ========    Evaluation of fetal growth.    Method  ======    Transabdominal ultrasound examination. View: Suboptimal view: limited by early gestational age.    Pregnancy  =========    Allen pregnancy. Number of fetuses: 1.    Dating  ======    LMP on: 11/12/2016  Cycle: regular cycle  GA by LMP 16 w + 6 d  AYANNA by LMP: 8/19/2017  Ultrasound examination on: 3/10/2017  GA by U/S based upon: AC, BPD, Femur, HC  GA by U/S 17 w + 0 d  AYANNA by U/S: 8/18/2017  Assigned: The Best Overall Assessment is based on the LMP.  Assigned GA 16 w + 6 d  Assigned AYANNA: 8/19/2017    General Evaluation  ==============    Cardiac activity: present.  bpm.  Fetal movements: visualized.  Presentation: transverse.  Placenta: Fundal.  Umbilical cord: 3 vessel cord.  Amniotic fluid: normal amount.    Fetal Biometry  ============    Fetal Biometry  BPD 35.6 mm 53% 17w 0d Hadlock  .0 mm 50% 17w 0d Hadlock  .0 mm 59% 17w 1d Hadlock  Femur 23.7 mm 56% 17w 1d Hadlock   g 60% 17w 0d Hadlock  Calculated by: Hadlock (BPD-HC-AC-FL)  EFW (lb) 0 lb  EFW (oz) 6 oz  HC / AC 1.20 53% Hadlock  FL / BPD 0.67 83% Hadlock  FL / AC 0.21 72% Hadlock   bpm    Fetal Anatomy  ============    Stomach: normal  Kidneys: normal  Bladder: normal  Arms: both visualized  Legs: both visualized    Maternal Structures  ===============    Uterus / Cervix  Uterus: Visualized  Fibroids: Fibroids identified  Findings: Cervical  D1 100.0 mm  D2 104.0 mm  D3 109.0 mm  Mean 104.3 mm  Vol 593.552 cm³  Findings: Posterior  D1 49.5 mm  D2 31.4 mm  D3 47.4 mm  Mean 42.8 mm  Vol 38.576 cm³  Cervix: Suboptimal  Approach: Transvaginal    Impression  =========    Limited anatomy was negative. No anomalies seen. AFV normal.  Fetal biometry is consistent and concordant with dating.    Because of cervical fibroid, CL could not be adequately assessed by TA US. A TV US was attempted but was too painful for patient. CL  appears to be  normal.    Recommendation  ==============    Standard Obstetrical care.

## 2017-03-12 LAB — BACTERIA UR CULT: NORMAL

## 2017-03-13 ENCOUNTER — TELEPHONE (OUTPATIENT)
Dept: OBSTETRICS AND GYNECOLOGY | Facility: CLINIC | Age: 32
End: 2017-03-13

## 2017-03-13 ENCOUNTER — HOSPITAL ENCOUNTER (INPATIENT)
Facility: OTHER | Age: 32
LOS: 2 days | Discharge: HOME | End: 2017-03-15
Attending: OBSTETRICS & GYNECOLOGY | Admitting: OBSTETRICS & GYNECOLOGY
Payer: COMMERCIAL

## 2017-03-13 DIAGNOSIS — D25.9 UTERINE LEIOMYOMA, UNSPECIFIED LOCATION: ICD-10-CM

## 2017-03-13 DIAGNOSIS — D25.9 UTERINE FIBROIDS AFFECTING PREGNANCY, SECOND TRIMESTER: Primary | ICD-10-CM

## 2017-03-13 DIAGNOSIS — Z3A.17 17 WEEKS GESTATION OF PREGNANCY: ICD-10-CM

## 2017-03-13 DIAGNOSIS — O26.892 ABDOMINAL PAIN DURING PREGNANCY IN SECOND TRIMESTER: ICD-10-CM

## 2017-03-13 DIAGNOSIS — R10.9 ABDOMINAL PAIN DURING PREGNANCY IN SECOND TRIMESTER: ICD-10-CM

## 2017-03-13 DIAGNOSIS — O34.12 UTERINE FIBROIDS AFFECTING PREGNANCY, SECOND TRIMESTER: Primary | ICD-10-CM

## 2017-03-13 PROBLEM — O34.10 UTERINE FIBROIDS AFFECTING PREGNANCY: Status: ACTIVE | Noted: 2017-03-13

## 2017-03-13 LAB
ABO + RH BLD: NORMAL
BASOPHILS # BLD AUTO: 0.01 K/UL
BASOPHILS NFR BLD: 0.1 %
BLD GP AB SCN CELLS X3 SERPL QL: NORMAL
DIFFERENTIAL METHOD: ABNORMAL
EOSINOPHIL # BLD AUTO: 0 K/UL
EOSINOPHIL NFR BLD: 0.1 %
ERYTHROCYTE [DISTWIDTH] IN BLOOD BY AUTOMATED COUNT: 15.3 %
HCT VFR BLD AUTO: 38.2 %
HGB BLD-MCNC: 13 G/DL
LYMPHOCYTES # BLD AUTO: 0.8 K/UL
LYMPHOCYTES NFR BLD: 5.4 %
MCH RBC QN AUTO: 27.8 PG
MCHC RBC AUTO-ENTMCNC: 34 %
MCV RBC AUTO: 82 FL
MONOCYTES # BLD AUTO: 0.8 K/UL
MONOCYTES NFR BLD: 5.7 %
NEUTROPHILS # BLD AUTO: 12.6 K/UL
NEUTROPHILS NFR BLD: 88.4 %
PLATELET # BLD AUTO: 248 K/UL
PMV BLD AUTO: 9.8 FL
RBC # BLD AUTO: 4.68 M/UL
WBC # BLD AUTO: 14.23 K/UL

## 2017-03-13 PROCEDURE — 76815 OB US LIMITED FETUS(S): CPT | Mod: 26,,, | Performed by: OBSTETRICS & GYNECOLOGY

## 2017-03-13 PROCEDURE — 63600175 PHARM REV CODE 636 W HCPCS: Performed by: OBSTETRICS & GYNECOLOGY

## 2017-03-13 PROCEDURE — 25000003 PHARM REV CODE 250: Performed by: OBSTETRICS & GYNECOLOGY

## 2017-03-13 PROCEDURE — 85025 COMPLETE CBC W/AUTO DIFF WBC: CPT

## 2017-03-13 PROCEDURE — 99253 IP/OBS CNSLTJ NEW/EST LOW 45: CPT | Mod: 25,,, | Performed by: OBSTETRICS & GYNECOLOGY

## 2017-03-13 PROCEDURE — 25000003 PHARM REV CODE 250: Performed by: STUDENT IN AN ORGANIZED HEALTH CARE EDUCATION/TRAINING PROGRAM

## 2017-03-13 PROCEDURE — 99284 EMERGENCY DEPT VISIT MOD MDM: CPT | Mod: ,,, | Performed by: OBSTETRICS & GYNECOLOGY

## 2017-03-13 PROCEDURE — 96372 THER/PROPH/DIAG INJ SC/IM: CPT

## 2017-03-13 PROCEDURE — 86850 RBC ANTIBODY SCREEN: CPT

## 2017-03-13 PROCEDURE — 11000001 HC ACUTE MED/SURG PRIVATE ROOM

## 2017-03-13 PROCEDURE — 99284 EMERGENCY DEPT VISIT MOD MDM: CPT | Mod: 25

## 2017-03-13 PROCEDURE — 96374 THER/PROPH/DIAG INJ IV PUSH: CPT

## 2017-03-13 PROCEDURE — 86900 BLOOD TYPING SEROLOGIC ABO: CPT

## 2017-03-13 PROCEDURE — 63600175 PHARM REV CODE 636 W HCPCS: Performed by: STUDENT IN AN ORGANIZED HEALTH CARE EDUCATION/TRAINING PROGRAM

## 2017-03-13 RX ORDER — NITROFURANTOIN 25; 75 MG/1; MG/1
100 CAPSULE ORAL 2 TIMES DAILY
Status: DISCONTINUED | OUTPATIENT
Start: 2017-03-13 | End: 2017-03-13

## 2017-03-13 RX ORDER — ONDANSETRON 8 MG/1
8 TABLET, ORALLY DISINTEGRATING ORAL EVERY 8 HOURS PRN
Status: DISCONTINUED | OUTPATIENT
Start: 2017-03-13 | End: 2017-03-14

## 2017-03-13 RX ORDER — HYDROMORPHONE HYDROCHLORIDE 2 MG/ML
0.5 INJECTION, SOLUTION INTRAMUSCULAR; INTRAVENOUS; SUBCUTANEOUS ONCE
Status: COMPLETED | OUTPATIENT
Start: 2017-03-13 | End: 2017-03-13

## 2017-03-13 RX ORDER — PROMETHAZINE HYDROCHLORIDE 25 MG/ML
12.5 INJECTION, SOLUTION INTRAMUSCULAR; INTRAVENOUS ONCE
Status: COMPLETED | OUTPATIENT
Start: 2017-03-13 | End: 2017-03-13

## 2017-03-13 RX ORDER — SODIUM CHLORIDE, SODIUM LACTATE, POTASSIUM CHLORIDE, CALCIUM CHLORIDE 600; 310; 30; 20 MG/100ML; MG/100ML; MG/100ML; MG/100ML
INJECTION, SOLUTION INTRAVENOUS CONTINUOUS
Status: DISCONTINUED | OUTPATIENT
Start: 2017-03-13 | End: 2017-03-14

## 2017-03-13 RX ORDER — PROGESTERONE 100 MG/1
200 CAPSULE ORAL NIGHTLY
Status: DISCONTINUED | OUTPATIENT
Start: 2017-03-13 | End: 2017-03-15 | Stop reason: HOSPADM

## 2017-03-13 RX ORDER — DOCUSATE SODIUM 100 MG/1
100 CAPSULE, LIQUID FILLED ORAL DAILY
Status: DISCONTINUED | OUTPATIENT
Start: 2017-03-13 | End: 2017-03-14

## 2017-03-13 RX ORDER — INDOMETHACIN 25 MG/1
25 CAPSULE ORAL
Status: DISCONTINUED | OUTPATIENT
Start: 2017-03-13 | End: 2017-03-15 | Stop reason: HOSPADM

## 2017-03-13 RX ORDER — HYDROMORPHONE HCL IN 0.9% NACL 6 MG/30 ML
PATIENT CONTROLLED ANALGESIA SYRINGE INTRAVENOUS CONTINUOUS
Status: DISCONTINUED | OUTPATIENT
Start: 2017-03-13 | End: 2017-03-14

## 2017-03-13 RX ORDER — FOLIC ACID/MULTIVIT,IRON,MINER 0.4MG-18MG
1 TABLET ORAL
Status: DISCONTINUED | OUTPATIENT
Start: 2017-03-14 | End: 2017-03-14

## 2017-03-13 RX ORDER — INDOMETHACIN 25 MG/1
50 CAPSULE ORAL ONCE
Status: COMPLETED | OUTPATIENT
Start: 2017-03-13 | End: 2017-03-13

## 2017-03-13 RX ADMIN — DOCUSATE SODIUM 100 MG: 100 CAPSULE, LIQUID FILLED ORAL at 04:03

## 2017-03-13 RX ADMIN — SODIUM CHLORIDE, SODIUM LACTATE, POTASSIUM CHLORIDE, AND CALCIUM CHLORIDE: .6; .31; .03; .02 INJECTION, SOLUTION INTRAVENOUS at 02:03

## 2017-03-13 RX ADMIN — HYDROMORPHONE HYDROCHLORIDE 0.5 MG: 2 INJECTION, SOLUTION INTRAMUSCULAR; INTRAVENOUS; SUBCUTANEOUS at 11:03

## 2017-03-13 RX ADMIN — Medication: at 02:03

## 2017-03-13 RX ADMIN — INDOMETHACIN 50 MG: 25 CAPSULE ORAL at 11:03

## 2017-03-13 RX ADMIN — INDOMETHACIN 25 MG: 25 CAPSULE ORAL at 04:03

## 2017-03-13 RX ADMIN — PROGESTERONE 200 MG: 100 CAPSULE ORAL at 08:03

## 2017-03-13 RX ADMIN — PROMETHAZINE HYDROCHLORIDE 12.5 MG: 25 INJECTION INTRAMUSCULAR; INTRAVENOUS at 11:03

## 2017-03-13 RX ADMIN — HYDROMORPHONE HYDROCHLORIDE 0.5 MG: 2 INJECTION, SOLUTION INTRAMUSCULAR; INTRAVENOUS; SUBCUTANEOUS at 12:03

## 2017-03-13 NOTE — LETTER
July 27, 2017    Brissa Prater  [address]  [place] [address]             [facility]  [address]  [place] [address]  Phone: [phone] Dear {MR/MRS/MS/DR:[id number]} Josi:    ***      If you have any questions or concerns, please don't hesitate to call.    Sincerely,        Didi Ortega, DO

## 2017-03-13 NOTE — ED PROVIDER NOTES
"Encounter Date: 3/13/2017       History     Chief Complaint   Patient presents with    Headache    Abdominal Pain     Review of patient's allergies indicates:  No Known Allergies  HPI Comments: Olga Prater is a 31 y.o. [id number] at 17w2d presents complaining of worsening pelvic pain. Patient with known h/o [id number] posterior fibroid in the lower aspect of uterus. Recently with severe pain likely secondary to fibroid necrosis. Patient has been taking Indocin q6h at home in addition to 1-2 Percocet 5-325mg prn pain. This morning patient took a percocet which did not relieve her pain. She took a second tablet but became nauseated and vomited. She then presented to [facility] with 10/10 abdominal pain that is more severe than she's previously experienced. The pain is pelvic and localized towards her back. States she feels it more "on the inside". Does not hurt when she presses on uterus. She reports also feeling intermittent tightening of her abdomen, unsure if they are contractions.    This IUP is otherwise uncomplicated.  Patient denies vaginal bleeding, denies LOF.   Fetal Movement: unsure yet this pregnancy.      Past Medical History:   Diagnosis Date    Fibroadenoma of right breast     Menarche     Age of onset 13    Migraine      Past Surgical History:   Procedure Laterality Date    BREAST BIOPSY      fibroadenoma    WISDOM TOOTH EXTRACTION       Family History   Problem Relation Age of Onset    Diabetes Father      Pre    Hypertension Father     Skin cancer Father     Thyroid disease Mother     Hypertension Sister     Breast cancer Maternal Grandmother     Colon cancer Neg Hx     Ovarian cancer Neg Hx     Melanoma Neg Hx      Social History   Substance Use Topics    Smoking status: Never Smoker    Smokeless tobacco: Never Used    Alcohol use No      Comment: social-- not currently     Review of Systems   Constitutional: Positive for chills. Negative for fever.   HENT: Positive for " nosebleeds. Negative for congestion.    Respiratory: Negative for cough and shortness of breath.    Cardiovascular: Negative for chest pain and palpitations.   Gastrointestinal: Positive for abdominal distention and constipation. Negative for nausea and vomiting.   Genitourinary: Positive for pelvic pain and vaginal pain. Negative for dysuria, vaginal bleeding and vaginal discharge.   Musculoskeletal: Positive for back pain.   Skin: Negative for rash.   Neurological: Negative for weakness and light-headedness.       Physical Exam   Initial Vitals   BP Pulse Resp Temp SpO2   03/13/17 1028 03/13/17 1028 03/13/17 1028 03/13/17 1029 --   157/92 117 18 98.2 °F (36.8 °C)      Physical Exam    Constitutional: She appears well-developed and well-nourished. She appears distressed.   HENT:   Head: Normocephalic and atraumatic.   Eyes: EOM are normal.   Neck: Normal range of motion. Neck supple.   Cardiovascular: Regular rhythm.   Intermittent tachycardia   Pulmonary/Chest: No respiratory distress.   Abdominal: Soft. She exhibits no distension. There is no tenderness. There is no rebound and no guarding.   Size > dates, fundus 3cm above umbilicus, likely 2/2 obstructing fibroid   Genitourinary: There is no rash, tenderness or lesion on the right labia. There is no rash, tenderness or lesion on the left labia.   Genitourinary Comments: On SVE, firm mass noted at lower uterine segment. Exquisitely tender to the touch. Cervix unable to be examined 2/2 discomfort.   Musculoskeletal: Normal range of motion. She exhibits no edema.   Neurological: She is alert and oriented to person, place, and time.   Skin: Skin is warm and dry.   Psychiatric: She has a normal mood and affect.         ED Course   Procedures  Labs Reviewed   CBC W/ AUTO DIFFERENTIAL                          Attending Attestation:   Physician Attestation Statement for Resident:  As the supervising MD   Physician Attestation Statement: I have personally seen and  examined this patient.   I agree with the above history. -:   As the supervising MD I agree with the above PE.    As the supervising MD I agree with the above treatment, course, plan, and disposition.   -: Patient evaluated and found to be stable, agree with resident's assessment and plan to admit for pain management with IV Dilaudid. Will have MFM consult.  I was personally present during the critical portions of the procedure(s) performed by the resident and was immediately available in the ED to provide services and assistance as needed during the entire procedure.  I have reviewed and agree with the residents interpretation of the following: lab data.                    ED Course     Clinical Impression:   The encounter diagnosis was Uterine fibroids affecting pregnancy, second trimester.     - Pain likely 2/2 degenerating fibroid  - uterine irritability on tocometry  - +FHTs  - Intermittent tachycardia and elevated BP, suspect pain related. VS wnl on repeat.  - Pain not responsive to Dilaudid 0.5mg IM x 2.   - Will admit for pain control possibly with Dilaudid PCA  - MFM consulted, appreciate recommendations.           Zandra Fernandez MD  Resident  03/13/17 1314       Jigna Leal MD  03/13/17 134

## 2017-03-13 NOTE — TELEPHONE ENCOUNTER
17 2/7 week OB  C/o headache and stomach cramps.  Has a history of headaches/migraines.  She is taking Indocin, Percocet, and Macrobid.  Her BP this AM 95/60 which she says is low for her. She took 1 percocet at 0330 with no relief.  She thinks the stomach cramping is due to the fibroid and the pain does get better with taking the percocet.  When she takes 2 Percocets it makes her nauseated.  Instructed her to drink a serving of caffeine with the Percocet and to take another one now.  Offered her an appt today, she is asking for advice since she came in Friday with an .      Allergies and pharmacy UTD.

## 2017-03-13 NOTE — ASSESSMENT & PLAN NOTE
- Pain likely 2/2 degenerating fibroid.   -Currently on dilaudid PCA.  Will ween today and switch to PO meds  - CBC with mild leukocytosis resolved 14.2>9.22

## 2017-03-13 NOTE — TELEPHONE ENCOUNTER
She cannot be a direct admit that she can go to the  emergency room at [facility].  They may be able to get the headache under control in the ER and then she we'll be able to go home.  Is she taking the Indocin now for the fibroid pain?  If she is not she can also try 600 mg of ibuprofen and see if that helps with a headache.

## 2017-03-13 NOTE — IP AVS SNAPSHOT
[facility] Location ([facility])  [address] [place], [place] [id number]  Phone: [phone]           Patient Discharge Instructions     Our goal is to set you up for success. This packet includes information on your condition, medications, and your home care. It will help you to care for yourself so you don't get sicker and need to go back to the hospital.     Please ask your nurse if you have any questions.        There are many details to remember when preparing to leave the hospital. Here is what you will need to do:    1. Take your medicine. If you are prescribed medications, review your Medication List in the following pages. You may have new medications to  at the pharmacy and others that you'll need to stop taking. Review the instructions for how and when to take your medications. Talk with your doctor or nurses if you are unsure of what to do.     2. Go to your follow-up appointments. Specific follow-up information is listed in the following pages. Your may be contacted by a transition nurse or clinical provider about future appointments. Be sure we have all of the phone numbers to reach you, if needed. Please contact your provider's office if you are unable to make an appointment.     3. Watch for warning signs. Your doctor or nurse will give you detailed warning signs to watch for and when to call for assistance. These instructions may also include educational information about your condition. If you experience any of warning signs to your health, call your doctor.               [facility]  Unless otherwise directed by your provider, please contact [facility], our nurse care line that is available for 24/7 assistance.     [phone] (toll-free)    Registered nurses in the [facility] provide clinical advisement, health education, appointment booking, and other advisory services.                    ** Verify the list of medication(s) below is accurate and up to  date. Carry this with you in case of emergency. If your medications have changed, please notify your healthcare provider.             Medication List      START taking these medications        Additional Info                      hydrocodone-acetaminophen 10-325mg  mg Tab   Commonly known as:  NORCO   Quantity:  45 tablet   Refills:  0   Dose:  1 tablet    Last time this was given:  1 tablet on 3/15/2017  1:43 PM   Instructions:  Take 1 tablet by mouth every 4 (four) hours as needed.     Begin Date    AM    Noon    PM    Bedtime       sucralfate 1 gram tablet   Commonly known as:  CARAFATE   Quantity:  12 tablet   Refills:  0   Dose:  1 g    Last time this was given:  1 g on 3/15/2017 10:45 AM   Instructions:  Take 1 tablet (1 g total) by mouth 3 (three) times daily before meals.     Begin Date    AM    Noon    PM    Bedtime         CHANGE how you take these medications        Additional Info                      indomethacin 50 MG capsule   Commonly known as:  INDOCIN   Quantity:  12 capsule   Refills:  0   Dose:  50 mg   What changed:  when to take this    Last time this was given:  25 mg on 3/15/2017 11:52 AM   Instructions:  Take 1 capsule (50 mg total) by mouth 3 (three) times daily with meals.     Begin Date    AM    Noon    PM    Bedtime       * progesterone 200 MG capsule   Commonly known as:  PROMETRIUM   Quantity:  30 capsule   Refills:  3   Dose:  200 mg   What changed:  Another medication with the same name was added. Make sure you understand how and when to take each.    Last time this was given:  200 mg on 3/14/2017  8:44 PM   Instructions:  Take 1 capsule (200 mg total) by mouth nightly.     Begin Date    AM    Noon    PM    Bedtime       * progesterone 200 MG capsule   Commonly known as:  PROMETRIUM   Quantity:  12 capsule   Refills:  0   Dose:  200 mg   What changed:  You were already taking a medication with the same name, and this prescription was added. Make sure you understand how and when  to take each.    Last time this was given:  200 mg on 3/14/2017  8:44 PM   Instructions:  Take 1 capsule (200 mg total) by mouth nightly.     Begin Date    AM    Noon    PM    Bedtime       * Notice:  This list has 2 medication(s) that are the same as other medications prescribed for you. Read the directions carefully, and ask your doctor or other care provider to review them with you.      CONTINUE taking these medications        Additional Info                      nitrofurantoin (macrocrystal-monohydrate) 100 MG capsule   Commonly known as:  MACROBID   Quantity:  14 capsule   Refills:  0   Dose:  100 mg    Instructions:  Take 1 capsule (100 mg total) by mouth 2 (two) times daily.     Begin Date    AM    Noon    PM    Bedtime       PRENATAL VITAMIN ORAL   Refills:  0    Instructions:  Take by mouth.     Begin Date    AM    Noon    PM    Bedtime       promethazine 25 MG tablet   Commonly known as:  PHENERGAN   Quantity:  20 tablet   Refills:  0   Dose:  25 mg    Instructions:  Take 1 tablet (25 mg total) by mouth every 4 (four) hours.     Begin Date    AM    Noon    PM    Bedtime         STOP taking these medications     oxycodone-acetaminophen 5-325 mg per tablet   Commonly known as:  ROXICET            Where to Get Your Medications      These medications were sent to [facility]/pharmacy #[id number] - [place]EREZ - [address]  [address], [place] [address]     Phone:  [phone]     hydrocodone-acetaminophen 10-325mg  mg Tab    indomethacin 50 MG capsule    progesterone 200 MG capsule    sucralfate 1 gram tablet                  Please bring to all follow up appointments:    1. A copy of your discharge instructions.  2. All medicines you are currently taking in their original bottles.  3. Identification and insurance card.    Please arrive 15 minutes ahead of scheduled appointment time.    Please call 24 hours in advance if you must reschedule your appointment and/or time.        Your Scheduled Appointments      Mar 30, 2017  2:20 PM CDT   Ultrasound with ULTRASOUND, [facility]TH [facility]IC   [facility] ([facility])    [address]  [place] [address] [phone]            Mar 30, 2017  3:30 PM CDT   [facility]ine [facility]ren[facility]    [address]  [address]  [facility] [address] [phone]            Apr 25, 2017  9:15 AM CDT   Routine Prenatal Visit with Mohan Pastrana MD   [facility] ([facility])    [address]  [place] [address] [phone]            May 23, 2017  9:45 AM CDT   Routine Prenatal Visit with Mohan Pastrana MD   [facility] ([facility])    [address]  [place] [address] [phone]            Jun 06, 2017  9:15 AM CDT   Routine Prenatal Visit with Mohan Pastrana MD   [facility] ([facility])    [address]  [place] [address] [phone]              Follow-up Information     Follow up with Mohan Pastrana MD.    Specialties:  Obstetrics and Gynecology, Obstetrics, Gynecology    Why:  as scheduled in two weeks, or sooner as needed    Contact information:    [address]  [address]  [facility] [address] [phone]          Discharge Instructions     Future Orders    Call MD for:  persistent nausea and vomiting or diarrhea     Call MD for:  severe uncontrolled pain     Call MD for:  temperature >100.4     Diet general     Questions:    Total calories:      Fat restriction, if any:      Protein restriction, if any:      Na restriction, if any:      Fluid restriction:      Additional restrictions:      Other restrictions (specify):     Comments:    Pelvic rest until follow up.  Gradually resume normal activities.        Discharge Instructions       Call clinic at [phone] or L&D after hours at [phone] for vaginal bleeding like a period,  "leakage of fluids like your water broke, contractions that are painful or in a regular pattern, decreased fetal movements (10 kicks in 2 hours), headache not relieved by Tylenol, blurry vision, pain under right breast, or temp of 100.4 or greater. Begin doing fetal kick counts, at least 10 movements in 2 hours starting at 28 weeks gestation. Keep next clinic appointment.      Primary Diagnosis     Your primary diagnosis was:  Abdominal Pain During Pregnancy In Second Trimester      Admission Information     Date & Time Provider Department CSN    3/13/2017 10:22 AM Mohan Pastrana MD [facility] [id number]      Care Providers     Provider Role Specialty Primary office phone    Mohan Pastrana MD Attending Provider Obstetrics and Gynecology [phone]    Elena Hutchins MD Consulting Physician  Maternal and Fetal Medicine [phone]      Your Vitals Were     BP Pulse Temp Resp Height Weight    126/75 83 97.7 °F (36.5 °C) (Oral) 17 5' 1.81" (1.57 m) 55.3 kg (121 lb 14.6 oz)    Last Period SpO2 BMI          11/12/2016 98% 22.44 kg/m2        Recent Lab Values     No lab values to display.      Allergies as of 3/15/2017     No Known Allergies      Advance Directives     An advance directive is a document which, in the event you are no longer able to make decisions for yourself, tells your healthcare team what kind of treatment you do or do not want to receive, or who you would like to make those decisions for you.  If you do not currently have an advance directive, [facility] encourages you to create one.  For more information call:  [phone]), [phone] ([phone]),  or log on to [email or web address].        Language Assistance Services     ATTENTION: Language assistance services are available, free of charge. Please call [phone].      ATENCIÓN: Si habla español, tiene a black disposición servicios gratuitos de asistencia lingüística. Llame al [phone].     CHÚ Ý: N?u " b?n nói Ti?ng Vi?t, có các d?ch v? h? tr? ngôn ng? mi?n phí dành cho b?n. G?i s? [phone].         [facility] complies with applicable Federal civil rights laws and does not discriminate on the basis of race, color, national origin, age, disability, or sex.

## 2017-03-13 NOTE — H&P
"Encounter Date: 3/13/2017        History          Chief Complaint   Patient presents with    Headache    Abdominal Pain      Review of patient's allergies indicates:  No Known Allergies  HPI Comments: Olga Prater is a 31 y.o. [id number] at 17w2d presents complaining of worsening pelvic pain. Patient with known h/o [id number] posterior fibroid in the lower aspect of uterus. Recently with severe pain likely secondary to fibroid necrosis. Patient has been taking Indocin q6h at home in addition to 1-2 Percocet 5-325mg prn pain. This morning patient took a percocet which did not relieve her pain. She took a second tablet but became nauseated and vomited. She then presented to [facility] with 10/10 abdominal pain that is more severe than she's previously experienced. The pain is pelvic and localized towards her back. States she feels it more "on the inside". Does not hurt when she presses on uterus. She reports also feeling intermittent tightening of her abdomen, unsure if they are contractions.   This IUP is otherwise uncomplicated. Patient denies vaginal bleeding, denies LOF. Fetal Movement: unsure yet this pregnancy.  Patient was given Macrobid for a presumed UTI however the urine culture was negative, will not continue.          Past Medical History:   Diagnosis Date    Fibroadenoma of right breast      Menarche       Age of onset 13    Migraine              Past Surgical History:   Procedure Laterality Date    BREAST BIOPSY         fibroadenoma    WISDOM TOOTH EXTRACTION                 Family History   Problem Relation Age of Onset    Diabetes Father         Pre    Hypertension Father      Skin cancer Father      Thyroid disease Mother      Hypertension Sister      Breast cancer Maternal Grandmother      Colon cancer Neg Hx      Ovarian cancer Neg Hx      Melanoma Neg Hx               Social History   Substance Use Topics    Smoking status: Never Smoker    Smokeless tobacco: Never Used    Alcohol " use No         Comment: social-- not currently      Review of Systems   Constitutional: Positive for chills. Negative for fever.   HENT: Positive for nosebleeds. Negative for congestion.   Respiratory: Negative for cough and shortness of breath.   Cardiovascular: Negative for chest pain and palpitations.   Gastrointestinal: Positive for abdominal distention and constipation. Negative for nausea and vomiting.   Genitourinary: Positive for pelvic pain and vaginal pain. Negative for dysuria, vaginal bleeding and vaginal discharge.   Musculoskeletal: Positive for back pain.   Skin: Negative for rash.   Neurological: Negative for weakness and light-headedness.                 Physical Exam          Initial Vitals   BP Pulse Resp Temp SpO2   03/13/17 1028 03/13/17 1028 03/13/17 1028 03/13/17 1029 --   157/92 117 18 98.2 °F (36.8 °C)       Vitals:    03/13/17 1028 03/13/17 1029 03/13/17 1045   BP: (!) 157/92  127/72   Pulse: (!) 117  98   Resp: 18  16   Temp:  98.2 °F (36.8 °C)      Physical Exam  Constitutional: She appears well-developed and well-nourished. She appears distressed.   HENT:   Head: Normocephalic and atraumatic.   Eyes: EOM are normal.   Neck: Normal range of motion. Neck supple.   Cardiovascular: Regular rhythm.   Intermittent tachycardia   Pulmonary/Chest: No respiratory distress.   Abdominal: Soft. She exhibits no distension. There is no tenderness. There is no rebound and no guarding.   Size > dates, fundus 3cm above umbilicus, likely 2/2 obstructing fibroid   Genitourinary: There is no rash, tenderness or lesion on the right labia. There is no rash, tenderness or lesion on the left labia.   Genitourinary Comments: On SVE, firm mass noted at lower uterine segment. Exquisitely tender to the touch. Cervix unable to be examined 2/2 discomfort.   Musculoskeletal: Normal range of motion. She exhibits no edema.   Neurological: She is alert and oriented to person, place, and time.   Skin: Skin is warm and dry.    Psychiatric: She has a normal mood and affect.         ED Course   Procedures  Labs Reviewed   CBC W/ AUTO DIFFERENTIAL                          Attending Attestation:   Physician Attestation Statement for Resident:  As the supervising MD   Physician Attestation Statement: I have personally seen and examined this patient. I agree with the above history. -:   As the supervising MD I agree with the above PE.   As the supervising MD I agree with the above treatment, course, plan, and disposition. -: Patient evaluated and found to be stable, agree with resident's assessment and plan to admit for pain management with IV Dilaudid. Will have MFM consult. I was personally present during the critical portions of the procedure(s) performed by the resident and was immediately available in the ED to provide services and assistance as needed during the entire procedure. I have reviewed and agree with the residents interpretation of the following: lab data.                     ED Course      Clinical Impression:   The encounter diagnosis was Uterine fibroids affecting pregnancy, second trimester.      - Pain likely 2/2 degenerating fibroid  - uterine irritability on tocometry  - +FHTs  - Intermittent tachycardia and elevated BP, suspect pain related. VS wnl on repeat.  - Pain not responsive to Dilaudid 0.5mg IM x 2.   - Will admit for pain control possibly with Dilaudid PCA  - MFM consulted, appreciate recommendations.  - Follow BP and consider treatment if severe range.  - Colace to prevent constipation.        Zandra Fernandez MD  Resident  03/13/17 3118

## 2017-03-13 NOTE — SUBJECTIVE & OBJECTIVE
HPI:  Patient reports 4-5/10 pain this morning which is much improved from her 10/10 pain on admit.  She reports using her PCA a little more frequently towards the end of the night.  Denies nausea/vomiting, CP and SOB.  Tolerating a PO diet.  No contractions, VB nor LOF.        Obstetric History       T0      TAB0   SAB0   E0   M0   L0       # Outcome Date GA Lbr Naldo/2nd Weight Sex Delivery Anes PTL Lv   1 Current                   Past Medical History:   Diagnosis Date    Fibroadenoma of right breast     Menarche     Age of onset 13    Migraine        Past Surgical History:   Procedure Laterality Date    BREAST BIOPSY      fibroadenoma    WISDOM TOOTH EXTRACTION         Review of patient's allergies indicates:  No Known Allergies      (Not in a hospital admission)  Family History     Problem Relation (Age of Onset)    Breast cancer Maternal Grandmother    Diabetes Father    Hypertension Father, Sister    Skin cancer Father    Thyroid disease Mother        Social History Main Topics    Smoking status: Never Smoker    Smokeless tobacco: Never Used    Alcohol use No      Comment: social-- not currently    Drug use: No    Sexual activity: Yes     Partners: Male     Birth control/ protection: Condom     Review of Systems   Constitutional: Negative for chills and fever.   Respiratory: Negative for shortness of breath.    Cardiovascular: Negative for chest pain and palpitations.   Gastrointestinal: Positive for constipation. Negative for nausea and vomiting.   Genitourinary: Positive for pelvic pain. Negative for vaginal bleeding and vaginal discharge.   Musculoskeletal: Negative for back pain.   Neurological: Negative for headaches.     Objective:     Vital Signs (24h Range):  Temp:  [98.2 °F (36.8 °C)] 98.2 °F (36.8 °C)  Pulse:  [] 98  Resp:  [16-18] 16  BP: (127-157)/(72-92) 127/72     Physical Exam:   Constitutional: She is oriented to person, place, and time. She appears well-developed  and well-nourished. No distress.    HENT:   Head: Normocephalic and atraumatic.    Eyes: EOM are normal.    Neck: Normal range of motion. Neck supple.    Cardiovascular: Normal rate and regular rhythm.     Pulmonary/Chest: Effort normal and breath sounds normal. No respiratory distress.        Abdominal: Soft. She exhibits no distension. There is no tenderness. There is no rebound and no guarding.   siize > dates; fundus 3cm above umbilicus, likely 2/2 obstructing fibroid             Musculoskeletal: Normal range of motion. She exhibits no edema.       Neurological: She is alert and oriented to person, place, and time.    Skin: Skin is warm and dry.    Psychiatric: She has a normal mood and affect.       Significant Labs:   CBC:    Recent Labs  Lab 03/13/17  1216   WBC 14.23*   HGB 13.0   HCT 38.2

## 2017-03-13 NOTE — ED TRIAGE NOTES
Pt complains of lower abdominal pain from her fibroid, rates pain 10/10. Also complains of headache 6/10 pain. Pt took Percocet this morning at 0900 but was not able to keep medication down.

## 2017-03-13 NOTE — TELEPHONE ENCOUNTER
Pt calling back, asking if she can get a direct admit to get the headache under control.    Offered her Zofran for the nausea that is caused with taking 2 percocets.  She has phenergan that she will take.

## 2017-03-13 NOTE — Clinical Note
I certify that Inpatient services for greater than or equal to 2 midnights are medically necessary:: No   Transfer To (Destination): [facility] LABOR AND DELIVERY [[id number]]   Diagnosis: Uterine fibroids affecting pregnancy, second trimester [552497]   Admitting Provider:: ALLIE MYERS [[id number]]   Future Attending Provider: BOOGIE VILLAFUERTE [[id number]]   Bed Type Preference: Standard [6]   Reason for IP Medical Treatment  (Clinical interventions that can only be accomplished in the IP setting? ) :: pain control   Estimated Length of Stay:: 2 midnights   Plans for Post-Acute care--if anticipated (pick the single best option):: A. No post acute care anticipated at this time   Special Needs:: No Special Needs [1]

## 2017-03-13 NOTE — TELEPHONE ENCOUNTER
Wax ob pt - pt is 18 weeks, pt's  Tj called and said pt has been laying on the sofa all morning not feeling well. She has a really bad headache and stomach pains. When she took her blood pressure it was really low and she is worried.

## 2017-03-13 NOTE — TELEPHONE ENCOUNTER
Pt went to [facility] for the headache. She tried taking a 2nd percocet with phenergan and immediately threw up.  Asked her to keep Dr. Pastrana updated.

## 2017-03-13 NOTE — LETTER
March 15, 2017                       [address]  [place] [address]  Phone: [phone]   March 15, 2017     Patient: Olga Prater   YOB: 1985   Date of Visit: 3/13/2017       To Whom it May Concern:    Olga Prater was hospitalized at [facility] on 3/13/2017. She may return to work the week of 3/27 as scheduled.  Please allow her to maintain light duty/low acuity patient load.     If you have any questions or concerns, please don't hesitate to call.    Sincerely,         Didi Ortega, DO

## 2017-03-14 PROBLEM — O99.012 ANTEPARTUM ANEMIA IN SECOND TRIMESTER: Status: ACTIVE | Noted: 2017-03-14

## 2017-03-14 PROBLEM — O26.892 ABDOMINAL PAIN DURING PREGNANCY IN SECOND TRIMESTER: Status: ACTIVE | Noted: 2017-03-14

## 2017-03-14 PROBLEM — Z3A.17 17 WEEKS GESTATION OF PREGNANCY: Status: ACTIVE | Noted: 2017-03-14

## 2017-03-14 PROBLEM — R10.9 ABDOMINAL PAIN DURING PREGNANCY IN SECOND TRIMESTER: Status: ACTIVE | Noted: 2017-03-14

## 2017-03-14 LAB
BASOPHILS # BLD AUTO: 0.02 K/UL
BASOPHILS NFR BLD: 0.2 %
DIFFERENTIAL METHOD: ABNORMAL
EOSINOPHIL # BLD AUTO: 0.1 K/UL
EOSINOPHIL NFR BLD: 1.4 %
ERYTHROCYTE [DISTWIDTH] IN BLOOD BY AUTOMATED COUNT: 15.4 %
HCT VFR BLD AUTO: 31.7 %
HGB BLD-MCNC: 10.6 G/DL
LYMPHOCYTES # BLD AUTO: 1.6 K/UL
LYMPHOCYTES NFR BLD: 17.4 %
MCH RBC QN AUTO: 27.6 PG
MCHC RBC AUTO-ENTMCNC: 33.4 %
MCV RBC AUTO: 83 FL
MONOCYTES # BLD AUTO: 1.1 K/UL
MONOCYTES NFR BLD: 11.6 %
NEUTROPHILS # BLD AUTO: 6.4 K/UL
NEUTROPHILS NFR BLD: 69.2 %
PLATELET # BLD AUTO: 202 K/UL
PMV BLD AUTO: 10 FL
RBC # BLD AUTO: 3.84 M/UL
WBC # BLD AUTO: 9.22 K/UL

## 2017-03-14 PROCEDURE — 25000003 PHARM REV CODE 250: Performed by: OBSTETRICS & GYNECOLOGY

## 2017-03-14 PROCEDURE — 36415 COLL VENOUS BLD VENIPUNCTURE: CPT

## 2017-03-14 PROCEDURE — 63600175 PHARM REV CODE 636 W HCPCS: Performed by: STUDENT IN AN ORGANIZED HEALTH CARE EDUCATION/TRAINING PROGRAM

## 2017-03-14 PROCEDURE — 99232 SBSQ HOSP IP/OBS MODERATE 35: CPT | Mod: ,,, | Performed by: OBSTETRICS & GYNECOLOGY

## 2017-03-14 PROCEDURE — 11000001 HC ACUTE MED/SURG PRIVATE ROOM

## 2017-03-14 PROCEDURE — 25000003 PHARM REV CODE 250: Performed by: STUDENT IN AN ORGANIZED HEALTH CARE EDUCATION/TRAINING PROGRAM

## 2017-03-14 PROCEDURE — 85025 COMPLETE CBC W/AUTO DIFF WBC: CPT

## 2017-03-14 RX ORDER — HYDROCODONE BITARTRATE AND ACETAMINOPHEN 10; 325 MG/1; MG/1
1 TABLET ORAL EVERY 4 HOURS PRN
Status: DISCONTINUED | OUTPATIENT
Start: 2017-03-14 | End: 2017-03-14

## 2017-03-14 RX ORDER — ONDANSETRON 8 MG/1
8 TABLET, ORALLY DISINTEGRATING ORAL EVERY 6 HOURS PRN
Status: DISCONTINUED | OUTPATIENT
Start: 2017-03-14 | End: 2017-03-15

## 2017-03-14 RX ORDER — OXYCODONE AND ACETAMINOPHEN 10; 325 MG/1; MG/1
1 TABLET ORAL EVERY 4 HOURS
Status: DISCONTINUED | OUTPATIENT
Start: 2017-03-14 | End: 2017-03-14

## 2017-03-14 RX ORDER — OXYCODONE AND ACETAMINOPHEN 5; 325 MG/1; MG/1
1 TABLET ORAL EVERY 4 HOURS
Status: DISCONTINUED | OUTPATIENT
Start: 2017-03-14 | End: 2017-03-14

## 2017-03-14 RX ORDER — PRENATAL WITH FERROUS FUM AND FOLIC ACID 3080; 920; 120; 400; 22; 1.84; 3; 20; 10; 1; 12; 200; 27; 25; 2 [IU]/1; [IU]/1; MG/1; [IU]/1; MG/1; MG/1; MG/1; MG/1; MG/1; MG/1; UG/1; MG/1; MG/1; MG/1; MG/1
1 TABLET ORAL
Status: DISCONTINUED | OUTPATIENT
Start: 2017-03-14 | End: 2017-03-14

## 2017-03-14 RX ORDER — HYDROCODONE BITARTRATE AND ACETAMINOPHEN 10; 325 MG/1; MG/1
1 TABLET ORAL EVERY 4 HOURS
Status: DISCONTINUED | OUTPATIENT
Start: 2017-03-14 | End: 2017-03-15

## 2017-03-14 RX ORDER — ONDANSETRON 4 MG/1
4 TABLET, ORALLY DISINTEGRATING ORAL EVERY 6 HOURS PRN
Status: DISCONTINUED | OUTPATIENT
Start: 2017-03-14 | End: 2017-03-15 | Stop reason: HOSPADM

## 2017-03-14 RX ORDER — ACETAMINOPHEN 325 MG/1
650 TABLET ORAL EVERY 6 HOURS PRN
Status: DISCONTINUED | OUTPATIENT
Start: 2017-03-14 | End: 2017-03-15 | Stop reason: HOSPADM

## 2017-03-14 RX ORDER — SENNOSIDES 8.6 MG/1
8.6 TABLET ORAL DAILY PRN
Status: DISCONTINUED | OUTPATIENT
Start: 2017-03-14 | End: 2017-03-15 | Stop reason: HOSPADM

## 2017-03-14 RX ORDER — SUCRALFATE 1 G/1
1 TABLET ORAL
Status: DISCONTINUED | OUTPATIENT
Start: 2017-03-14 | End: 2017-03-15 | Stop reason: HOSPADM

## 2017-03-14 RX ORDER — HYDROMORPHONE HYDROCHLORIDE 2 MG/ML
0.5 INJECTION, SOLUTION INTRAMUSCULAR; INTRAVENOUS; SUBCUTANEOUS
Status: DISCONTINUED | OUTPATIENT
Start: 2017-03-14 | End: 2017-03-15 | Stop reason: HOSPADM

## 2017-03-14 RX ORDER — ADHESIVE BANDAGE
30 BANDAGE TOPICAL EVERY 8 HOURS PRN
Status: DISCONTINUED | OUTPATIENT
Start: 2017-03-14 | End: 2017-03-15 | Stop reason: HOSPADM

## 2017-03-14 RX ORDER — PROMETHAZINE HYDROCHLORIDE 25 MG/1
25 TABLET ORAL EVERY 6 HOURS PRN
Status: DISCONTINUED | OUTPATIENT
Start: 2017-03-14 | End: 2017-03-15 | Stop reason: HOSPADM

## 2017-03-14 RX ORDER — DOCUSATE SODIUM 100 MG/1
100 CAPSULE, LIQUID FILLED ORAL 2 TIMES DAILY
Status: DISCONTINUED | OUTPATIENT
Start: 2017-03-14 | End: 2017-03-15 | Stop reason: HOSPADM

## 2017-03-14 RX ADMIN — SUCRALFATE 1 G: 1 TABLET ORAL at 12:03

## 2017-03-14 RX ADMIN — PROGESTERONE 200 MG: 100 CAPSULE ORAL at 08:03

## 2017-03-14 RX ADMIN — OXYCODONE HYDROCHLORIDE AND ACETAMINOPHEN 1 TABLET: 10; 325 TABLET ORAL at 03:03

## 2017-03-14 RX ADMIN — Medication 1 EACH: at 09:03

## 2017-03-14 RX ADMIN — SUCRALFATE 1 G: 1 TABLET ORAL at 07:03

## 2017-03-14 RX ADMIN — OXYCODONE HYDROCHLORIDE AND ACETAMINOPHEN 1 TABLET: 10; 325 TABLET ORAL at 10:03

## 2017-03-14 RX ADMIN — INDOMETHACIN 25 MG: 25 CAPSULE ORAL at 08:03

## 2017-03-14 RX ADMIN — INDOMETHACIN 25 MG: 25 CAPSULE ORAL at 12:03

## 2017-03-14 RX ADMIN — HYDROMORPHONE HYDROCHLORIDE 0.5 MG: 2 INJECTION, SOLUTION INTRAMUSCULAR; INTRAVENOUS; SUBCUTANEOUS at 12:03

## 2017-03-14 RX ADMIN — ACETAMINOPHEN 650 MG: 325 TABLET ORAL at 09:03

## 2017-03-14 RX ADMIN — HYDROCODONE BITARTRATE AND ACETAMINOPHEN 1 TABLET: 10; 325 TABLET ORAL at 11:03

## 2017-03-14 RX ADMIN — INDOMETHACIN 25 MG: 25 CAPSULE ORAL at 06:03

## 2017-03-14 RX ADMIN — DOCUSATE SODIUM 100 MG: 100 CAPSULE, LIQUID FILLED ORAL at 08:03

## 2017-03-14 RX ADMIN — MAGNESIUM HYDROXIDE 2400 MG: 400 SUSPENSION ORAL at 10:03

## 2017-03-14 RX ADMIN — HYDROCODONE BITARTRATE AND ACETAMINOPHEN 1 TABLET: 10; 325 TABLET ORAL at 07:03

## 2017-03-14 NOTE — CONSULTS
"[facility]  Maternal & Fetal Medicine  Consult Note    Patient Name: Olga Prater  MRN: [id number]  Admission Date: 3/13/2017  Attending Physician: Mohan Pastrana MD  Primary Care Provider: Jevon Campbell MD    Consults  Subjective:     Principal Problem:Uterine fibroids affecting pregnancy    HPI:  HPI Comments: Olga Prater is a 31 y.o. [id number] at 17w2d presents complaining of worsening pelvic pain. Patient with known h/o [id number] posterior fibroid in the lower aspect of uterus. Recently with severe pain likely secondary to fibroid necrosis. Patient has been taking Indocin q6h at home in addition to 1-2 Percocet 5-325mg prn pain. This morning patient took a percocet which did not relieve her pain. She took a second tablet but became nauseated and vomited. She then presented to [facility] with 10/10 abdominal pain that is more severe than she's previously experienced. The pain is pelvic and localized towards her back. States she feels it more "on the inside". Does not hurt when she presses on uterus. She reports also feeling intermittent tightening of her abdomen, unsure if they are contractions.   This IUP is otherwise uncomplicated. Patient denies vaginal bleeding, denies LOF. Fetal Movement: unsure yet this pregnancy.      No new subjective & objective note has been filed under this hospital service since the last note was generated.  Please see H&P for exam     Assessment/Plan:     17w2d weeks gestation presents for uterine fibroids in pregnancy.     -- Admit for observation  -- Recommend Dilaudid PCA for pain control  -- Recommend Indocin 25mg TID   -- MFM US performed today and revealed 2 fibroids visualized: one large fibroid posterior measuring 10.5cm x 9.3cm x 11.6cm and one moderate sized left lateral posterior fibroid 6.0cm x3.0cm x3.8cm  -- Monitor in house until adequate pain control w/ oral medications       Brenda Ojeda MD  Maternal & Fetal Medicine  [facility]

## 2017-03-14 NOTE — PROGRESS NOTES
PROGRESS NOTE - ANTEPARTUM    Admit Date: 3/13/2017   LOS: 1 day     Reason for Admission:  Abdominal pain during pregnancy in second trimester    SUBJECTIVE:     Olga Prater is a 31 y.o. female at 17w3d who is here for pain control due to painful, large degenerating uterine fibroid in pregnancy.  Patient was on outpatient PO medications without adequate relief. Since admission and starting Dilaudid PCA and increasing scheduled indomethacin, her pain has improved from a 10/10 to 4-5/10.      She reports nausea controlled.  Her appetite has been low the past few days; but she is feeling like eating today. She has NOT had a BM in 3 days, and that one was small.  She does not feel as if she needs to have a BM at this time.  She is passing flatus. No vomiting.     Patient reports None contractions, active fetal movement, No vaginal bleeding , No loss of fluid    OBJECTIVE:     Vital Signs (last 24 hours)  Temp:  [97.8 °F (36.6 °C)-98.2 °F (36.8 °C)] 97.8 °F (36.6 °C)  Pulse:  [] 85  Resp:  [12-22] 18  SpO2:  [98 %-100 %] 100 %  BP: ()/(57-92) 128/85    I & O (Last 24H):  Intake/Output Summary (Last 24 hours) at 17 0943  Last data filed at 17 0648   Gross per 24 hour   Intake          3019.73 ml   Output              800 ml   Net          2219.73 ml       Physical Exam:  General: well developed, well nourished, appears stated age, no distress  Lungs:  clear to auscultation bilaterally and normal respiratory effort  Abdomen: + bowel sounds; soft; gravid Uterus just above umbilicus (size > dates); + tenderness; no guarding  Extremities: no cyanosis or edema, or clubbing and SCDs on bilateral lower extremities    FHT: +  Laboratory:  CBC:   Recent Labs  Lab 17  0614   WBC 9.22   RBC 3.84*   HGB 10.6*   HCT 31.7*      MCV 83   MCH 27.6   MCHC 33.4         ASSESSMENT/PLAN:     Assessment:   31 y.o.female  at 17w3d HD#2 for pain control due to degenerating uterine fibroids,  pain improving.     Active Hospital Problems    Diagnosis  POA    *Abdominal pain during pregnancy in second trimester [O26.892, R10.9]  Yes    17 weeks gestation of pregnancy [Z3A.17]  Not Applicable    Antepartum anemia in second trimester [O99.012]  No     Anemia after IV hydration      Uterine fibroids affecting pregnancy [O34.10, D25.9]  Yes      Resolved Hospital Problems    Diagnosis Date Resolved POA   No resolved problems to display.       Plan:  1. GI prophylaxis - will start Carafate TID to protect stomach lining from prolonged Indomethacin usage  2. Constipation due to narcotics and delayed GI transit of pregnancy - will increase Colace to BID; patient aware of PRN medications available - MOM & Sennakot  3. Pain control - MFM monitoring & will wean PCA as tolerated today & try to transition patient to PO regimen of Indomethacin and narcotic/acetaminophen PRN    Scheduled Meds:   docusate sodium  100 mg Oral BID    indomethacin  25 mg Oral TID WM    prenatal vitamin  1 tablet Oral DAILY PC    progesterone  200 mg Oral Nightly    sucralfate  1 g Oral TID AC     Continuous Infusions:   hydromorphone in 0.9 % NaCl 6 mg/30 ml      lactated ringers 125 mL/hr at 03/13/17 1439     PRN Meds:acetaminophen, magnesium hydroxide 400 mg/5 ml, ondansetron, senna    Joycelyn Sharma MD

## 2017-03-14 NOTE — PROGRESS NOTES
"[facility]  Maternal & Fetal Medicine  Progress Note    Patient Name: Olga Prater  MRN: [id number]  Code Status: Full Code   Admission Date: 3/13/2017  Length of Stay: 1 days  Attending Physician: Mohan Pastrana MD  Primary Care Provider: Jevon Campbell MD    Subjective:     HPI:  HPI Comments: Olga Prater is a 31 y.o. [id number] at 17w2d presents complaining of worsening pelvic pain. Patient with known h/o [id number] posterior fibroid in the lower aspect of uterus. Recently with severe pain likely secondary to fibroid necrosis. Patient has been taking Indocin q6h at home in addition to 1-2 Percocet 5-325mg prn pain. This morning patient took a percocet which did not relieve her pain. She took a second tablet but became nauseated and vomited. She then presented to [facility] with 10/10 abdominal pain that is more severe than she's previously experienced. The pain is pelvic and localized towards her back. States she feels it more "on the inside". Does not hurt when she presses on uterus. She reports also feeling intermittent tightening of her abdomen, unsure if they are contractions.   This IUP is otherwise uncomplicated. Patient denies vaginal bleeding, denies LOF. Fetal Movement: unsure yet this pregnancy.      HPI:  Patient reports 4-5/10 pain this morning which is much improved from her 10/10 pain on admit.  She reports using her PCA a little more frequently towards the end of the night.  Denies nausea/vomiting, CP and SOB.  Tolerating a PO diet.  No contractions, VB nor LOF.        Obstetric History       T0      TAB0   SAB0   E0   M0   L0       # Outcome Date GA Lbr Naldo/2nd Weight Sex Delivery Anes PTL Lv   1 Current                   Past Medical History:   Diagnosis Date    Fibroadenoma of right breast     Menarche     Age of onset 13    Migraine        Past Surgical History:   Procedure Laterality Date    BREAST BIOPSY      fibroadenoma    WISDOM TOOTH " EXTRACTION         Review of patient's allergies indicates:  No Known Allergies      (Not in a hospital admission)  Family History     Problem Relation (Age of Onset)    Breast cancer Maternal Grandmother    Diabetes Father    Hypertension Father, Sister    Skin cancer Father    Thyroid disease Mother        Social History Main Topics    Smoking status: Never Smoker    Smokeless tobacco: Never Used    Alcohol use No      Comment: social-- not currently    Drug use: No    Sexual activity: Yes     Partners: Male     Birth control/ protection: Condom     Review of Systems   Constitutional: Negative for chills and fever.   Respiratory: Negative for shortness of breath.    Cardiovascular: Negative for chest pain and palpitations.   Gastrointestinal: Positive for constipation. Negative for nausea and vomiting.   Genitourinary: Positive for pelvic pain. Negative for vaginal bleeding and vaginal discharge.   Musculoskeletal: Negative for back pain.   Neurological: Negative for headaches.     Objective:     Vital Signs (24h Range):  Temp:  [98.2 °F (36.8 °C)] 98.2 °F (36.8 °C)  Pulse:  [] 98  Resp:  [16-18] 16  BP: (127-157)/(72-92) 127/72     Physical Exam:   Constitutional: She is oriented to person, place, and time. She appears well-developed and well-nourished. No distress.    HENT:   Head: Normocephalic and atraumatic.    Eyes: EOM are normal.    Neck: Normal range of motion. Neck supple.    Cardiovascular: Normal rate and regular rhythm.     Pulmonary/Chest: Effort normal and breath sounds normal. No respiratory distress.        Abdominal: Soft. She exhibits no distension. There is no tenderness. There is no rebound and no guarding.   siize > dates; fundus 3cm above umbilicus, likely 2/2 obstructing fibroid             Musculoskeletal: Normal range of motion. She exhibits no edema.       Neurological: She is alert and oriented to person, place, and time.    Skin: Skin is warm and dry.    Psychiatric: She has  a normal mood and affect.       Significant Labs:   CBC:    Recent Labs  Lab 03/13/17  1216   WBC 14.23*   HGB 13.0   HCT 38.2        Assessment/Plan:   17w3d weeks gestation presents for:    * Uterine fibroids affecting pregnancy  - Pain likely 2/2 degenerating fibroid.   -Currently on dilaudid PCA.  Will ween today and switch to PO meds  - CBC with mild leukocytosis resolved 14.2>9.22      Elevated BP  -No further issues. Normotensive      Jeff Booth MD  Maternal & Fetal Medicine  [facility]

## 2017-03-15 VITALS
HEIGHT: 62 IN | DIASTOLIC BLOOD PRESSURE: 75 MMHG | RESPIRATION RATE: 17 BRPM | SYSTOLIC BLOOD PRESSURE: 126 MMHG | OXYGEN SATURATION: 98 % | TEMPERATURE: 98 F | WEIGHT: 121.94 LBS | BODY MASS INDEX: 22.44 KG/M2 | HEART RATE: 83 BPM

## 2017-03-15 PROCEDURE — 25000003 PHARM REV CODE 250: Performed by: OBSTETRICS & GYNECOLOGY

## 2017-03-15 PROCEDURE — 99239 HOSP IP/OBS DSCHRG MGMT >30: CPT | Mod: ,,, | Performed by: OBSTETRICS & GYNECOLOGY

## 2017-03-15 PROCEDURE — 25000003 PHARM REV CODE 250: Performed by: STUDENT IN AN ORGANIZED HEALTH CARE EDUCATION/TRAINING PROGRAM

## 2017-03-15 RX ORDER — SUCRALFATE 1 G/1
1 TABLET ORAL
Qty: 12 TABLET | Refills: 0 | Status: SHIPPED | OUTPATIENT
Start: 2017-03-15 | End: 2017-03-23

## 2017-03-15 RX ORDER — HYDROCODONE BITARTRATE AND ACETAMINOPHEN 10; 325 MG/1; MG/1
1 TABLET ORAL EVERY 4 HOURS PRN
Status: DISCONTINUED | OUTPATIENT
Start: 2017-03-15 | End: 2017-03-15 | Stop reason: HOSPADM

## 2017-03-15 RX ORDER — PROGESTERONE 200 MG/1
200 CAPSULE ORAL NIGHTLY
Qty: 12 CAPSULE | Refills: 0 | Status: SHIPPED | OUTPATIENT
Start: 2017-03-15 | End: 2017-03-23

## 2017-03-15 RX ORDER — HYDROCODONE BITARTRATE AND ACETAMINOPHEN 10; 325 MG/1; MG/1
1 TABLET ORAL EVERY 4 HOURS PRN
Qty: 45 TABLET | Refills: 0 | Status: SHIPPED | OUTPATIENT
Start: 2017-03-15 | End: 2017-03-30

## 2017-03-15 RX ORDER — PRENATAL WITH FERROUS FUM AND FOLIC ACID 3080; 920; 120; 400; 22; 1.84; 3; 20; 10; 1; 12; 200; 27; 25; 2 [IU]/1; [IU]/1; MG/1; [IU]/1; MG/1; MG/1; MG/1; MG/1; MG/1; MG/1; UG/1; MG/1; MG/1; MG/1; MG/1
1 TABLET ORAL DAILY
Status: DISCONTINUED | OUTPATIENT
Start: 2017-03-15 | End: 2017-03-15 | Stop reason: HOSPADM

## 2017-03-15 RX ORDER — INDOMETHACIN 50 MG/1
50 CAPSULE ORAL
Qty: 12 CAPSULE | Refills: 0 | Status: SHIPPED | OUTPATIENT
Start: 2017-03-15 | End: 2017-03-23

## 2017-03-15 RX ADMIN — MAGNESIUM HYDROXIDE 2400 MG: 400 SUSPENSION ORAL at 10:03

## 2017-03-15 RX ADMIN — HYDROCODONE BITARTRATE AND ACETAMINOPHEN 1 TABLET: 10; 325 TABLET ORAL at 08:03

## 2017-03-15 RX ADMIN — SUCRALFATE 1 G: 1 TABLET ORAL at 07:03

## 2017-03-15 RX ADMIN — HYDROCODONE BITARTRATE AND ACETAMINOPHEN 1 TABLET: 10; 325 TABLET ORAL at 01:03

## 2017-03-15 RX ADMIN — INDOMETHACIN 25 MG: 25 CAPSULE ORAL at 07:03

## 2017-03-15 RX ADMIN — Medication 1 EACH: at 09:03

## 2017-03-15 RX ADMIN — SUCRALFATE 1 G: 1 TABLET ORAL at 10:03

## 2017-03-15 RX ADMIN — ONDANSETRON 4 MG: 4 TABLET, ORALLY DISINTEGRATING ORAL at 07:03

## 2017-03-15 RX ADMIN — HYDROCODONE BITARTRATE AND ACETAMINOPHEN 1 TABLET: 10; 325 TABLET ORAL at 03:03

## 2017-03-15 RX ADMIN — DOCUSATE SODIUM 100 MG: 100 CAPSULE, LIQUID FILLED ORAL at 08:03

## 2017-03-15 RX ADMIN — ACETAMINOPHEN 650 MG: 325 TABLET ORAL at 10:03

## 2017-03-15 RX ADMIN — INDOMETHACIN 25 MG: 25 CAPSULE ORAL at 11:03

## 2017-03-15 NOTE — PROGRESS NOTES
"[facility]  Maternal & Fetal Medicine  Progress Note    Patient Name: Olga Prater  MRN: [id number]  Code Status: Full Code   Admission Date: 3/13/2017  Length of Stay: 2 days  Attending Physician: Mohan Pastrana MD  Primary Care Provider: Jevon Campbell MD    Subjective:     HPI:  HPI Comments: Olga Prater is a 31 y.o. [id number] admitted at 17w2d who presented complaining of worsening pelvic pain. Patient with known h/o [id number] posterior fibroid in the lower aspect of uterus. Recently with severe pain likely secondary to fibroid necrosis. Patient has been taking Indocin q6h at home in addition to 1-2 Percocet 5-325mg prn pain. This morning patient took a percocet which did not relieve her pain. She took a second tablet but became nauseated and vomited. She then presented to [facility] with 10/10 abdominal pain that is more severe than she's previously experienced. The pain is pelvic and localized towards her back. States she feels it more "on the inside". Does not hurt when she presses on uterus. She reports also feeling intermittent tightening of her abdomen, unsure if they are contractions.   This IUP is otherwise uncomplicated.      Interval History: No acute events overnight.  This morning patient reports 1-2/10 pain.  She has not required any IV dilaudid since yesterday afternoon.  Tolerating a PO diet without n/v.  Denies CP, SOB, dizziness and weakness. No ctx, VB nor LOF.    Review of patient's allergies indicates:  No Known Allergies    Objective:     Vital Signs (Most Recent):  Temp: 97.8 °F (36.6 °C) (03/15/17 0357)  Pulse: 73 (03/15/17 0357)  Resp: 16 (03/15/17 0357)  BP: (!) 101/56 (03/15/17 0357)  SpO2: 98 % (03/15/17 0357) Vital Signs (24h Range):  Temp:  [97.1 °F (36.2 °C)-98.7 °F (37.1 °C)] 97.8 °F (36.6 °C)  Pulse:  [73-90] 73  Resp:  [16-18] 16  SpO2:  [98 %-100 %] 98 %  BP: (101-140)/(56-85) 101/56       Intake/Output Summary (Last 24 hours) at 03/15/17 0630  Last " data filed at 03/14/17 1900   Gross per 24 hour   Intake              700 ml   Output              100 ml   Net              600 ml       Physical Exam:   Constitutional: She appears well-developed and well-nourished.    HENT:   Head: Normocephalic and atraumatic.    Eyes: Conjunctivae are normal.     Cardiovascular: Normal heart sounds.     Pulmonary/Chest: Effort normal.        Abdominal: Soft. She exhibits no distension and no mass. There is no tenderness. There is no rebound and no guarding. No hernia.                  Skin: Skin is warm and dry.            Significant Labs: No results found for this or any previous visit (from the past 12 hour(s)).       Assessment/Plan:   17w4d weeks gestation presents for:    Uterine fibroids affecting pregnancy  - Pain likely 2/2 degenerating fibroid. Much improved since admit  -PCA d/c'd yesterday.  Switched from percocet to norco yesterday with dilaudid IV prn.  Did not require PRN dilaudid since yesterday afternoon.  - CBC with mild leukocytosis resolved 14.2>9.22  -Will likely d/c later today with norco and indocin       17 weeks gestation of pregnancy  -Daily PNV      Jeff Booth MD  Maternal & Fetal Medicine  [facility]

## 2017-03-15 NOTE — DISCHARGE INSTRUCTIONS
Call clinic at [phone] or L&D after hours at [phone] for vaginal bleeding like a period, leakage of fluids like your water broke, contractions that are painful or in a regular pattern, decreased fetal movements (10 kicks in 2 hours), headache not relieved by Tylenol, blurry vision, pain under right breast, or temp of 100.4 or greater. Begin doing fetal kick counts, at least 10 movements in 2 hours starting at 28 weeks gestation. Keep next clinic appointment.

## 2017-03-15 NOTE — ASSESSMENT & PLAN NOTE
- Pain likely 2/2 degenerating fibroid. Much improved since admit  -PCA d/c'd yesterday.  Switched from percocet to norco yesterday with dilaudid IV prn.  Did not require PRN dilaudid since yesterday afternoon.  - CBC with mild leukocytosis resolved 14.2>9.22  -Will likely d/c later today with norco and indocin

## 2017-03-15 NOTE — PLAN OF CARE
Problem: Patient Care Overview  Goal: Plan of Care Review  Outcome: Outcome(s) achieved Date Met:  03/15/17  Patient states pain tolerable and at comfort level with prn pain medication. Patient denies contractions, LOF, VB. VSS. Discharge home today.

## 2017-03-15 NOTE — DISCHARGE SUMMARY
Delivery Discharge Summary  Obstetrics      Primary OB Clinician: Shruti Pastrana MD    Discharge Provider: Didi Ortega MD    Admission date: 3/13/2017  Discharge date: 03/15/2017    Admit Dx:   Discharge Dx:    Patient Active Problem List   Diagnosis    Elevated BP    Fibroadenoma of right breast    Uterine fibroid complicating  care, baby not yet delivered    Uterine fibroids affecting pregnancy    17 weeks gestation of pregnancy    Abdominal pain during pregnancy in second trimester    Antepartum anemia in second trimester       Procedure: Pain control for degenerating fibroid    Hospital Course:  Olga Prater is a 31 y.o. now  who was admitted on 3/13/2017 at 17wga for pain associated with degenerating fiboid not controlled on po medications.  She was admitted and started on PCA due to significant pain, then weaned to PRN IV pain meds.  On day of discharge, she has been 24 hours without IV pain meds, and is feeling well.  She is stable for discharge home.      Pertinent studies:  Postpartum CBC  Lab Results   Component Value Date    WBC 9.22 2017    HGB 10.6 (L) 2017    HCT 31.7 (L) 2017    MCV 83 2017     2017       This patient has no babies on file.    Disposition: To home, self care    Follow Up: 2 weeks    Patient Instructions:   1. Call the office for any pain or other concern  2. Pelvic rest until follow up  3. No driving while on narcotics.    Current Discharge Medication List      START taking these medications    Details   hydrocodone-acetaminophen 10-325mg (NORCO)  mg Tab Take 1 tablet by mouth every 4 (four) hours as needed.  Qty: 45 tablet, Refills: 0    Associated Diagnoses: Uterine fibroids affecting pregnancy, second trimester; 17 weeks gestation of pregnancy; Abdominal pain during pregnancy in second trimester      sucralfate (CARAFATE) 1 gram tablet Take 1 tablet (1 g total) by mouth 3 (three) times daily before  meals.  Qty: 12 tablet, Refills: 0    Associated Diagnoses: Uterine fibroids affecting pregnancy, second trimester; 17 weeks gestation of pregnancy; Abdominal pain during pregnancy in second trimester         CONTINUE these medications which have CHANGED    Details   indomethacin (INDOCIN) 50 MG capsule Take 1 capsule (50 mg total) by mouth 3 (three) times daily with meals.  Qty: 12 capsule, Refills: 0    Associated Diagnoses: Uterine leiomyoma, unspecified location; Uterine fibroids affecting pregnancy, second trimester; 17 weeks gestation of pregnancy; Abdominal pain during pregnancy in second trimester      !! progesterone (PROMETRIUM) 200 MG capsule Take 1 capsule (200 mg total) by mouth nightly.  Qty: 12 capsule, Refills: 0    Associated Diagnoses: Uterine fibroids affecting pregnancy, second trimester; 17 weeks gestation of pregnancy; Abdominal pain during pregnancy in second trimester       !! - Potential duplicate medications found. Please discuss with provider.      CONTINUE these medications which have NOT CHANGED    Details   PRENATAL VIT CALC,IRON,FOLIC (PRENATAL VITAMIN ORAL) Take by mouth.      !! progesterone (PROMETRIUM) 200 MG capsule Take 1 capsule (200 mg total) by mouth nightly.  Qty: 30 capsule, Refills: 3    Associated Diagnoses: Vaginal bleeding in pregnancy, first trimester      nitrofurantoin, macrocrystal-monohydrate, (MACROBID) 100 MG capsule Take 1 capsule (100 mg total) by mouth 2 (two) times daily.  Qty: 14 capsule, Refills: 0    Associated Diagnoses: Uterine leiomyoma, unspecified location      promethazine (PHENERGAN) 25 MG tablet Take 1 tablet (25 mg total) by mouth every 4 (four) hours.  Qty: 20 tablet, Refills: 0    Associated Diagnoses: Uterine leiomyoma, unspecified location       !! - Potential duplicate medications found. Please discuss with provider.      STOP taking these medications       oxycodone-acetaminophen (ROXICET) 5-325 mg per tablet Comments:   Reason for Stopping:                Didi Ortega, DO

## 2017-03-15 NOTE — PROGRESS NOTES
Discharge instructions provided to patient and spouse at bedside. Patient states understanding of instructions and willingness to comply. Transport requested. Patient informed to wait to be escorted out via wheelchair to vehicle by transport staff. Patient accompanied by spouse, who states is going to drive patient home.

## 2017-03-15 NOTE — SUBJECTIVE & OBJECTIVE
Interval History: No acute events overnight.  This morning patient reports 1-2/10 pain.  She has not required any IV dilaudid since yesterday afternoon.  Tolerating a PO diet without n/v.  Denies CP, SOB, dizziness and weakness. No ctx, VB nor LOF.    Review of patient's allergies indicates:  No Known Allergies    Objective:     Vital Signs (Most Recent):  Temp: 97.8 °F (36.6 °C) (03/15/17 0357)  Pulse: 73 (03/15/17 0357)  Resp: 16 (03/15/17 0357)  BP: (!) 101/56 (03/15/17 0357)  SpO2: 98 % (03/15/17 0357) Vital Signs (24h Range):  Temp:  [97.1 °F (36.2 °C)-98.7 °F (37.1 °C)] 97.8 °F (36.6 °C)  Pulse:  [73-90] 73  Resp:  [16-18] 16  SpO2:  [98 %-100 %] 98 %  BP: (101-140)/(56-85) 101/56       Intake/Output Summary (Last 24 hours) at 03/15/17 0630  Last data filed at 03/14/17 1900   Gross per 24 hour   Intake              700 ml   Output              100 ml   Net              600 ml       Physical Exam:   Constitutional: She appears well-developed and well-nourished.    HENT:   Head: Normocephalic and atraumatic.    Eyes: Conjunctivae are normal.     Cardiovascular: Normal heart sounds.     Pulmonary/Chest: Effort normal.        Abdominal: Soft. She exhibits no distension and no mass. There is no tenderness. There is no rebound and no guarding. No hernia.                  Skin: Skin is warm and dry.            Significant Labs: No results found for this or any previous visit (from the past 12 hour(s)).

## 2017-03-15 NOTE — PROGRESS NOTES
Ms. Prater is a 31to G1 at 17.4wga D2 of hospitalization due to severe pain due to degenerating fibroid.  She states that pain is much better on norco and indocin.  She last received IV dilaudid yesterday at noon.  She has had no cramping, just the steady abdominal pain associated with the fibroid.  No vaginal bleeding, fluid nor discharge.     Vitals:    03/15/17 0357 03/15/17 0358 03/15/17 0721 03/15/17 0722   BP: (!) 101/56  115/71    Pulse: 73 77 82 80   Resp: 16  17    Temp: 97.8 °F (36.6 °C)  97.6 °F (36.4 °C)    TempSrc: Oral  Oral    SpO2: 98%  98%    Weight:       Height:         Gen: NAD  Resp: non-labored  Abd: gravid, fundus at umbilicus, non-tender, no guarding, no rebound  Ext: no edema    A: IUP at 17.4 wga      Pain from degenerating fibroid    P: continue routine prenatal care       Continue current pain management plan, and discharge once she no longer requires IV pain meds    Didi Ortega DO

## 2017-03-23 ENCOUNTER — ROUTINE PRENATAL (OUTPATIENT)
Dept: OBSTETRICS AND GYNECOLOGY | Facility: CLINIC | Age: 32
End: 2017-03-23
Attending: OBSTETRICS & GYNECOLOGY
Payer: COMMERCIAL

## 2017-03-23 VITALS — BODY MASS INDEX: 22.46 KG/M2 | WEIGHT: 122 LBS | DIASTOLIC BLOOD PRESSURE: 74 MMHG | SYSTOLIC BLOOD PRESSURE: 116 MMHG

## 2017-03-23 DIAGNOSIS — D25.9 UTERINE FIBROIDS AFFECTING PREGNANCY IN SECOND TRIMESTER: ICD-10-CM

## 2017-03-23 DIAGNOSIS — O34.12 UTERINE FIBROIDS AFFECTING PREGNANCY IN SECOND TRIMESTER: Primary | ICD-10-CM

## 2017-03-23 DIAGNOSIS — O34.12 UTERINE FIBROIDS AFFECTING PREGNANCY IN SECOND TRIMESTER: ICD-10-CM

## 2017-03-23 DIAGNOSIS — D25.9 UTERINE FIBROIDS AFFECTING PREGNANCY IN SECOND TRIMESTER: Primary | ICD-10-CM

## 2017-03-23 PROBLEM — O99.012 ANTEPARTUM ANEMIA IN SECOND TRIMESTER: Status: RESOLVED | Noted: 2017-03-14 | Resolved: 2017-03-23

## 2017-03-23 PROBLEM — Z3A.17 17 WEEKS GESTATION OF PREGNANCY: Status: RESOLVED | Noted: 2017-03-14 | Resolved: 2017-03-23

## 2017-03-23 PROBLEM — O34.10 UTERINE FIBROIDS AFFECTING PREGNANCY: Status: RESOLVED | Noted: 2017-03-13 | Resolved: 2017-03-23

## 2017-03-23 PROCEDURE — 99999 PR PBB SHADOW E&M-EST. PATIENT-LVL I: CPT | Mod: PBBFAC,,, | Performed by: OBSTETRICS & GYNECOLOGY

## 2017-03-23 PROCEDURE — 76815 OB US LIMITED FETUS(S): CPT | Mod: S$GLB,,, | Performed by: OBSTETRICS & GYNECOLOGY

## 2017-03-23 PROCEDURE — 0502F SUBSEQUENT PRENATAL CARE: CPT | Mod: S$GLB,,, | Performed by: OBSTETRICS & GYNECOLOGY

## 2017-03-23 NOTE — MR AVS SNAPSHOT
[facility]  [address], [address]  [facility] [address]  Phone: [phone]  Fax: [phone]                  Olga Prater   3/23/2017 10:15 AM   Routine Prenatal    Description:  Female : 1985   Provider:  Mohan Pastrana MD   Department:  [facility]           Diagnoses this Visit        Comments    Uterine fibroids affecting pregnancy in second trimester    -  Primary            To Do List           Future Appointments        Provider Department Dept Phone    3/30/2017 2:20 PM ULTRASOUND, [facility] 4TH FLR CLINIC [facility] Maternal Fetal Med [phone]    3/30/2017 3:30 PM Mohan Pastrana MD [facility] [phone]    2017 9:15 AM Mohan Pastrana MD [facility] [phone]    2017 9:45 AM Mohan Pastrana MD [facility] [phone]    2017 9:15 AM Mohan Pastrana MD [facility] [phone]      Goals (5 Years of Data)     None      [facility]     [facility] On [facility] Care Line -  Assistance  Registered nurses in the [facility] On Call Center provide clinical advisement, health education, appointment booking, and other advisory services.  Call for this free service at [phone].             Medications           Message regarding Medications     Verify the changes and/or additions to your medication regime listed below are the same as discussed with your clinician today.  If any of these changes or additions are incorrect, please notify your healthcare provider.        STOP taking these medications     indomethacin (INDOCIN) 50 MG capsule Take 1 capsule (50 mg total) by mouth 3 (three) times daily with meals.    sucralfate (CARAFATE) 1 gram tablet Take 1 tablet (1 g total) by mouth 3 (three) times daily before meals.    progesterone (PROMETRIUM) 200 MG capsule Take 1 capsule (200 mg total) by mouth nightly.    progesterone (PROMETRIUM) 200 MG capsule Take 1 capsule (200 mg total) by mouth  "nightly.    promethazine (PHENERGAN) 25 MG tablet Take 1 tablet (25 mg total) by mouth every 4 (four) hours.           Verify that the below list of medications is an accurate representation of the medications you are currently taking.  If none reported, the list may be blank. If incorrect, please contact your healthcare provider. Carry this list with you in case of emergency.           Current Medications     hydrocodone-acetaminophen 10-325mg (NORCO)  mg Tab Take 1 tablet by mouth every 4 (four) hours as needed.    PRENATAL VIT CALC,IRON,FOLIC (PRENATAL VITAMIN ORAL) Take by mouth.           Clinical Reference Information           Prenatal Vitals     Enc. Date GA Prenatal Vitals Prenatal Pulse Pain Level Urine Albumin/Glucose Edema Presentation Dilation/Effacement/Station    3/23/17 18w5d 116/74 / 55.3 kg (122 lb 0.4 oz) 20 cm /  / Absent  0 Negative / Negative None / None / None      3/13/17 17w2d Admission Dx: Uterine fibroids affecting pregnancy, second trimester [facility]    3/10/17 16w6d 120/78 / 55.3 kg (121 lb 14.6 oz)  /  / Absent  3 Negative / Negative None / None / None / No      3/1/17 15w4d 112/76 / 56 kg (123 lb 7.3 oz)  / 138 / Absent  0 Negative / Negative None / None / None / No      2/9/17 12w5d  / 53.9 kg (118 lb 13.3 oz)           2/6/17 12w2d 130/72 / 54.3 kg (119 lb 11.4 oz)  / 164 / Absent  0 Negative / Negative       1/31/17 11w3d 132/70 / 54.4 kg (119 lb 14.9 oz)  / 170 / Absent  0 Negative / Negative None / None / None / No      1/12/17 8w5d 138/78           1/12/17 8w5d 158/80 (A) / 54 kg (119 lb 0.8 oz)              Height: 5' 1.81" (1.57 m)       Your Vitals Were     BP Weight Last Period BMI       116/74 55.3 kg (122 lb 0.4 oz) 11/12/2016 22.46 kg/m2       Allergies as of 3/23/2017     No Known Allergies      Immunizations Administered on Date of Encounter - 3/23/2017     None      Orders Placed During Today's Visit     Future Labs/Procedures Expected by Expires    US " OB/GYN Procedure ([facility]) - Extended List - Future  As directed 3/23/2018      Language Assistance Services     ATTENTION: Language assistance services are available, free of charge. Please call [phone].      ATENCIÓN: Si josi regalado, tiene a black disposición servicios gratuitos de asistencia lingüística. Llame al [phone].     [facility] Ý: N?u b?n nói Ti?ng Vi?t, có các d?ch v? h? tr? ngôn ng? mi?n phí dành cho b?n. G?i s? [phone].         [facility] complies with applicable Federal civil rights laws and does not discriminate on the basis of race, color, national origin, age, disability, or sex.

## 2017-03-23 NOTE — MR AVS SNAPSHOT
[facility]  [address], [address]  [facility] [address]  Phone: [phone]  Fax: [phone]                  Olga Prater   3/23/2017 11:30 AM   Procedure visit    Description:  Female : 1985   Provider:  [facility], WOMEN'S ULTRASOUND   Department:  [facility]           Diagnoses this Visit        Comments    Uterine fibroids affecting pregnancy in second trimester                To Do List           Future Appointments        Provider Department Dept Phone    3/30/2017 2:20 PM ULTRASOUND, [facility] 4TH FLR CLINIC [facility] [phone]    3/30/2017 3:30 PM Mohan Pastrana MD [facility] [phone]    2017 9:15 AM Mohan Pastrana MD [facility] [phone]    2017 9:45 AM Mohan Pastrana MD [facility] [phone]    2017 9:15 AM Mohan Pastrana MD [facility] [phone]      Goals (5 Years of Data)     None      [facility]     [facility] On Call Nurse [facility] -  Assistance  Registered nurses in the [facility] On Call Center provide clinical advisement, health education, appointment booking, and other advisory services.  Call for this free service at [phone].             Medications           Message regarding Medications     Verify the changes and/or additions to your medication regime listed below are the same as discussed with your clinician today.  If any of these changes or additions are incorrect, please notify your healthcare provider.             Verify that the below list of medications is an accurate representation of the medications you are currently taking.  If none reported, the list may be blank. If incorrect, please contact your healthcare provider. Carry this list with you in case of emergency.           Current Medications     hydrocodone-acetaminophen 10-325mg (NORCO)  mg Tab Take 1 tablet by mouth every 4 (four) hours as needed.    PRENATAL VIT  "CALC,IRON,FOLIC (PRENATAL VITAMIN ORAL) Take by mouth.           Clinical Reference Information           Prenatal Vitals     Enc. Date GA Prenatal Vitals Prenatal Pulse Pain Level Urine Albumin/Glucose Edema Presentation Dilation/Effacement/Station    3/23/17 18w5d 116/74 / 55.3 kg (122 lb 0.4 oz) 20 cm /  / Absent  0 Negative / Negative None / None / None      3/13/17 17w2d Admission Dx: Uterine fibroids affecting pregnancy, second [facility]    3/10/17 16w6d 120/78 / 55.3 kg (121 lb 14.6 oz)  /  / Absent  3 Negative / Negative None / None / None / No      3/1/17 15w4d 112/76 / 56 kg (123 lb 7.3 oz)  / 138 / Absent  0 Negative / Negative None / None / None / No      2/9/17 12w5d  / 53.9 kg (118 lb 13.3 oz)           2/6/17 12w2d 130/72 / 54.3 kg (119 lb 11.4 oz)  / 164 / Absent  0 Negative / Negative       1/31/17 11w3d 132/70 / 54.4 kg (119 lb 14.9 oz)  / 170 / Absent  0 Negative / Negative None / None / None / No      1/12/17 8w5d 138/78           1/12/17 8w5d 158/80 (A) / 54 kg (119 lb 0.8 oz)              Height: 5' 1.81" (1.57 m)       Your Vitals Were     Last Period                   11/12/2016           Allergies as of 3/23/2017     No Known Allergies      Immunizations Administered on Date of Encounter - 3/23/2017     None      Orders Placed During Today's Visit      Normal Orders This Visit    US OB/GYN Procedure ([facility]) - Extended List - Future       Language Assistance Services     ATTENTION: Language assistance services are available, free of charge. Please call [phone].      ATENCIÓN: Si evela fabricio, tiene a black disposición servicios gratuitos de asistencia lingüística. Llame al [phone].     CHÚ Ý: N?u b?n nói Ti?ng Vi?t, có các d?ch v? h? tr? ngôn ng? mi?n phí dành cho b?n. G?i s? [phone].         [facility] complies with applicable Federal civil rights laws and does not discriminate on the basis of race, color, national origin, age, " disability, or sex.

## 2017-03-23 NOTE — PROGRESS NOTES
Stopped indocin this past Friday.  Working light duty sitting. No bleeding. Feel sore. Has lortab prn but not taking it.  Ultrasound today to check TIFFANY and midline pedunculated fibroid as well.  Discussed risk of PTL/PTD need for  delivery.  Also discussed getting MRI of pelvis after delivery 3-6 mo as preop planning for myomectomy.

## 2017-03-23 NOTE — PROCEDURES
Procedures See ultrasound report for details  Fibroids as previously noted  Normal Amniotic fluid volume,  Normal placental location,   Normal cervical length

## 2017-03-30 ENCOUNTER — ROUTINE PRENATAL (OUTPATIENT)
Dept: OBSTETRICS AND GYNECOLOGY | Facility: CLINIC | Age: 32
End: 2017-03-30
Attending: OBSTETRICS & GYNECOLOGY
Payer: COMMERCIAL

## 2017-03-30 ENCOUNTER — OFFICE VISIT (OUTPATIENT)
Dept: MATERNAL FETAL MEDICINE | Facility: CLINIC | Age: 32
End: 2017-03-30
Attending: OBSTETRICS & GYNECOLOGY
Payer: COMMERCIAL

## 2017-03-30 VITALS
DIASTOLIC BLOOD PRESSURE: 82 MMHG | WEIGHT: 124.56 LBS | SYSTOLIC BLOOD PRESSURE: 134 MMHG | BODY MASS INDEX: 22.92 KG/M2

## 2017-03-30 DIAGNOSIS — D25.9 UTERINE FIBROID COMPLICATING ANTENATAL CARE, BABY NOT YET DELIVERED, SECOND TRIMESTER: ICD-10-CM

## 2017-03-30 DIAGNOSIS — O34.10 UTERINE FIBROID IN PREGNANCY: ICD-10-CM

## 2017-03-30 DIAGNOSIS — D25.9 UTERINE FIBROID IN PREGNANCY: ICD-10-CM

## 2017-03-30 DIAGNOSIS — O34.12 UTERINE FIBROID COMPLICATING ANTENATAL CARE, BABY NOT YET DELIVERED, SECOND TRIMESTER: ICD-10-CM

## 2017-03-30 DIAGNOSIS — Z36.89 ENCOUNTER FOR ULTRASOUND TO CHECK FETAL GROWTH: Primary | ICD-10-CM

## 2017-03-30 DIAGNOSIS — Z34.02 ENCOUNTER FOR SUPERVISION OF NORMAL FIRST PREGNANCY IN SECOND TRIMESTER: Primary | ICD-10-CM

## 2017-03-30 DIAGNOSIS — Z36.89 ENCOUNTER FOR FETAL ANATOMIC SURVEY: ICD-10-CM

## 2017-03-30 PROCEDURE — 99499 UNLISTED E&M SERVICE: CPT | Mod: S$GLB,,, | Performed by: OBSTETRICS & GYNECOLOGY

## 2017-03-30 PROCEDURE — 76805 OB US >/= 14 WKS SNGL FETUS: CPT | Mod: S$GLB,,, | Performed by: OBSTETRICS & GYNECOLOGY

## 2017-03-30 PROCEDURE — 0502F SUBSEQUENT PRENATAL CARE: CPT | Mod: S$GLB,,, | Performed by: OBSTETRICS & GYNECOLOGY

## 2017-03-30 PROCEDURE — 99999 PR PBB SHADOW E&M-EST. PATIENT-LVL I: CPT | Mod: PBBFAC,,, | Performed by: OBSTETRICS & GYNECOLOGY

## 2017-03-30 NOTE — PROGRESS NOTES
Repeat OB ultrasound (see full report under imaging tab in EPIC)  A deluna living IUP is identified. Fetal size is appropriate for established dating. No fetal structural malformations are identified. Cervical length is normal, and placental location is normal without evidence of previa.   Due to large intramural uterine myoma, serial growth studies beginning at 28 weeks are recommended.

## 2017-03-30 NOTE — LETTER
March 30, 2017      Elena Hutchins MD  [address]  [facility] [address]           [facility]  [address]  [facility] [address]  Phone: [phone]          Patient: Olga Prater   MR Number: [id number]   YOB: 1985   Date of Visit: 3/30/2017       Dear Dr. Elena Hutchins:    Thank you for referring Olga Prater to me for evaluation. Attached you will find relevant portions of my assessment and plan of care.    If you have questions, please do not hesitate to call me. I look forward to following Olga Prater along with you.    Sincerely,    Gabriela Castaneda MD    Enclosure  CC:  No Recipients    If you would like to receive this communication electronically, please contact [email or web address] or [phone] to request more information on [employer] access.    For providers and/or their staff who would like to refer a patient to [facility], please contact us through our one-stop-shop provider referral line, [facility], at [phone].    If you feel you have received this communication in error or would no longer like to receive these types of communications, please e-mail [email or web address]

## 2017-03-30 NOTE — MR AVS SNAPSHOT
[facility]  [address], [address]  [facility] [address]  Phone: [phone]  Fax: [phone]                  Olga Prater   3/30/2017 3:30 PM   Routine Prenatal    Description:  Female : 1985   Provider:  Mohan Pastrana MD   Department:  [facility]                To Do List           Future Appointments        Provider Department Dept Phone    2017 9:15 AM Mohan Pastrana MD [facility] [phone]    2017 9:45 AM Mohan Pastrana MD [facility] [phone]    2017 9:15 AM Mohan Pastrana MD [facility] [phone]    2017 9:15 AM Mohan Pastrana MD [facility] [phone]    2017 9:45 AM Mohan Pastrana MD [facility] [phone]      Goals (5 Years of Data)     None      [facility]     [facility] On Call Nurse Care Line -  Assistance  Unless otherwise directed by your provider, please contact [facility], our nurse care line that is available for  assistance.     Registered nurses in the [facility] On Call Center provide: appointment scheduling, clinical advisement, health education, and other advisory services.  Call: [phone] (toll free)               Medications           Message regarding Medications     Verify the changes and/or additions to your medication regime listed below are the same as discussed with your clinician today.  If any of these changes or additions are incorrect, please notify your healthcare provider.             Verify that the below list of medications is an accurate representation of the medications you are currently taking.  If none reported, the list may be blank. If incorrect, please contact your healthcare provider. Carry this list with you in case of emergency.           Current Medications     hydrocodone-acetaminophen 10-325mg (NORCO)  mg Tab Take 1 tablet by mouth every 4 (four) hours as needed.    PRENATAL VIT  "CALC,IRON,FOLIC (PRENATAL VITAMIN ORAL) Take by mouth.           Clinical Reference Information           Prenatal Vitals     Enc. Date GA Prenatal Vitals Prenatal Pulse Pain Level Urine Albumin/Glucose Edema Presentation Dilation/Effacement/Station    3/30/17 19w5d 134/82 / 56.5 kg (124 lb 9 oz)  /  / Present   Negative / Negative None / None / None      3/23/17 18w5d 116/74 / 55.3 kg (122 lb 0.4 oz) 20 cm /  / Absent  0 Negative / Negative None / None / None      3/13/17 17w2d Admission Dx: Uterine fibroids affecting pregnancy, second trimester Dept: [facility]    3/10/17 16w6d 120/78 / 55.3 kg (121 lb 14.6 oz)  /  / Absent  3 Negative / Negative None / None / None / No      3/1/17 15w4d 112/76 / 56 kg (123 lb 7.3 oz)  / 138 / Absent  0 Negative / Negative None / None / None / No      2/9/17 12w5d  / 53.9 kg (118 lb 13.3 oz)           2/6/17 12w2d 130/72 / 54.3 kg (119 lb 11.4 oz)  / 164 / Absent  0 Negative / Negative       1/31/17 11w3d 132/70 / 54.4 kg (119 lb 14.9 oz)  / 170 / Absent  0 Negative / Negative None / None / None / No      1/12/17 8w5d 138/78           1/12/17 8w5d 158/80 (A) / 54 kg (119 lb 0.8 oz)              Height: 5' 1.81" (1.57 m)       Your Vitals Were     BP Weight Last Period BMI       134/82 56.5 kg (124 lb 9 oz) 11/12/2016 22.92 kg/m2       Allergies as of 3/30/2017     No Known Allergies      Immunizations Administered on Date of Encounter - 3/30/2017     None      Language Assistance Services     ATTENTION: Language assistance services are available, free of charge. Please call [phone].      ATENCIÓN: Si habla fabricio, tiene a black disposición servicios gratuitos de asistencia lingüística. Llame al [phone].     CHÚ Ý: N?u b?n nói Ti?ng Vi?t, có các d?ch v? h? tr? ngôn ng? mi?n phí dành cho b?n. G?i s? [phone].         [facility] complies with applicable Federal civil rights laws and does not discriminate on the basis of race, color, national origin, " age, disability, or sex.

## 2017-04-12 ENCOUNTER — TELEPHONE (OUTPATIENT)
Dept: OBSTETRICS AND GYNECOLOGY | Facility: CLINIC | Age: 32
End: 2017-04-12

## 2017-04-20 ENCOUNTER — TELEPHONE (OUTPATIENT)
Dept: OBSTETRICS AND GYNECOLOGY | Facility: CLINIC | Age: 32
End: 2017-04-20

## 2017-04-20 ENCOUNTER — PATIENT MESSAGE (OUTPATIENT)
Dept: OBSTETRICS AND GYNECOLOGY | Facility: CLINIC | Age: 32
End: 2017-04-20

## 2017-04-20 RX ORDER — AZITHROMYCIN 250 MG/1
TABLET, FILM COATED ORAL
Qty: 6 TABLET | Refills: 0 | Status: SHIPPED | OUTPATIENT
Start: 2017-04-20 | End: 2017-04-25

## 2017-04-20 RX ORDER — AZITHROMYCIN 250 MG/1
TABLET, FILM COATED ORAL
Qty: 2 TABLET | Refills: 0 | Status: SHIPPED | OUTPATIENT
Start: 2017-04-20 | End: 2017-04-20 | Stop reason: SDUPTHER

## 2017-04-20 NOTE — TELEPHONE ENCOUNTER
22 5/7 week OB thinks she has a URI.  She has been taking Zyrtec for a week with no relief.  C/o runny nose, sore throat, and green nasal discharge.  Advised if she wanted a steroid injection she could go see her PCP/Urgent care.  She would like to have a rx called into pharmacy.

## 2017-04-20 NOTE — TELEPHONE ENCOUNTER
Dr Pastrana pt calling, OB 23wks thinks is have a upper respiratory infection runny nose and cough with green mucus.Pt wants to know if she can have something called in.Pt #  [phone]

## 2017-04-25 ENCOUNTER — ROUTINE PRENATAL (OUTPATIENT)
Dept: OBSTETRICS AND GYNECOLOGY | Facility: CLINIC | Age: 32
End: 2017-04-25
Payer: COMMERCIAL

## 2017-04-25 VITALS
DIASTOLIC BLOOD PRESSURE: 70 MMHG | SYSTOLIC BLOOD PRESSURE: 126 MMHG | BODY MASS INDEX: 23.71 KG/M2 | WEIGHT: 128.88 LBS

## 2017-04-25 DIAGNOSIS — Z34.02 ENCOUNTER FOR SUPERVISION OF NORMAL FIRST PREGNANCY IN SECOND TRIMESTER: Primary | ICD-10-CM

## 2017-04-25 PROCEDURE — 0502F SUBSEQUENT PRENATAL CARE: CPT | Mod: S$GLB,,, | Performed by: OBSTETRICS & GYNECOLOGY

## 2017-04-25 PROCEDURE — 99999 PR PBB SHADOW E&M-EST. PATIENT-LVL II: CPT | Mod: PBBFAC,,, | Performed by: OBSTETRICS & GYNECOLOGY

## 2017-04-25 RX ORDER — CALCIUM CARBONATE 200(500)MG
1 TABLET,CHEWABLE ORAL DAILY
COMMUNITY
End: 2017-08-15

## 2017-04-25 NOTE — MR AVS SNAPSHOT
[facility]  [address]  [place] [address]  Phone: [phone]  Fax: [phone]                  Olga Prater   2017 9:15 AM   Routine Prenatal    Description:  Female : 1985   Provider:  Mohan Pastrana MD   Department:  [facility]           Reason for Visit     Routine Prenatal Visit           Diagnoses this Visit        Comments    Encounter for supervision of normal first pregnancy in second trimester    -  Primary            To Do List           Future Appointments        Provider Department Dept Phone    2017 9:45 AM Mohan Pastrana MD [facility] [phone]    2017 9:15 AM Mohan Pastrana MD [facility] [phone]    2017 9:15 AM Mohan Pastrana MD [facility] [phone]    2017 9:45 AM Mohan Pastrana MD [facility] [phone]    2017 9:15 AM Mohan Pastrana MD [facility] [phone]      Goals (5 Years of Data)     None      Follow-Up and Disposition     Return in about 4 weeks (around 2017).    Follow-up and Disposition History      [facility]     [facility] On [facility] Nurse Care Line -  Assistance  Unless otherwise directed by your provider, please contact [facility], our nurse care line that is available for  assistance.     Registered nurses in the [facility] On Call Center provide: appointment scheduling, clinical advisement, health education, and other advisory services.  Call: [phone] (toll free)               Medications           Message regarding Medications     Verify the changes and/or additions to your medication regime listed below are the same as discussed with your clinician today.  If any of these changes or additions are incorrect, please notify your healthcare provider.             Verify that the below list of medications is an accurate representation of the medications you are currently taking.  If none  "reported, the list may be blank. If incorrect, please contact your healthcare provider. Carry this list with you in case of emergency.           Current Medications     calcium carbonate (TUMS) 200 mg calcium (500 mg) chewable tablet Take 1 tablet by mouth once daily.    PRENATAL VIT CALC,IRON,FOLIC (PRENATAL VITAMIN ORAL) Take by mouth.    azithromycin (ZITHROMAX) 250 MG tablet Take 2 tablets (500 mg) on  Day 1,  followed by 1 tablet (250 mg) once daily on Days 2 through 5.           Clinical Reference Information           Prenatal Vitals     Enc. Date GA Prenatal Vitals Prenatal Pulse Pain Level Urine Albumin/Glucose Edema Presentation Dilation/Effacement/Station    4/25/17 23w3d 126/70 / 58.4 kg (128 lb 13.7 oz) 24 cm / 138 / Present  0 Negative / Negative None / None / None      3/30/17 19w5d 134/82 / 56.5 kg (124 lb 9 oz) 20 cm / 142 / Present   Negative / Negative None / None / None      3/23/17 18w5d 116/74 / 55.3 kg (122 lb 0.4 oz) 20 cm /  / Absent  0 Negative / Negative None / None / None      3/13/17 17w2d Admission Dx: Uterine fibroids affecting pregnancy, second trimester Dept: [facility]    3/10/17 16w6d 120/78 / 55.3 kg (121 lb 14.6 oz)  /  / Absent  3 Negative / Negative None / None / None / No      3/1/17 15w4d 112/76 / 56 kg (123 lb 7.3 oz)  / 138 / Absent  0 Negative / Negative None / None / None / No      2/9/17 12w5d  / 53.9 kg (118 lb 13.3 oz)           2/6/17 12w2d 130/72 / 54.3 kg (119 lb 11.4 oz)  / 164 / Absent  0 Negative / Negative       1/31/17 11w3d 132/70 / 54.4 kg (119 lb 14.9 oz)  / 170 / Absent  0 Negative / Negative None / None / None / No      1/12/17 8w5d 138/78           1/12/17 8w5d 158/80 (A) / 54 kg (119 lb 0.8 oz)              Height: 5' 1.81" (1.57 m)       Your Vitals Were     BP Weight Last Period BMI       126/70 58.4 kg (128 lb 13.7 oz) 11/12/2016 23.71 kg/m2       Allergies as of 4/25/2017     No Known Allergies      Immunizations Administered on Date of Encounter - " 4/25/2017     None      Language Assistance Services     ATTENTION: Language assistance services are available, free of charge. Please call [phone].      ATENCIÓN: Si habla fabricio, tiene a black disposición servicios gratuitos de asistencia lingüística. Llame al [phone].     CHÚ Ý: N?u b?n nói Ti?ng Vi?t, có các d?ch v? h? tr? ngôn ng? mi?n phí dành cho b?n. G?i s? [phone].         [facility] complies with applicable Federal civil rights laws and does not discriminate on the basis of race, color, national origin, age, disability, or sex.

## 2017-04-25 NOTE — PROGRESS NOTES
No c/o.  Working light duty.  No problems.  Good FM. No bleeding no pain.  Does not have MFM follow up scheduled yet.  Will do at 28wks with Dm screen.

## 2017-05-21 ENCOUNTER — HOSPITAL ENCOUNTER (INPATIENT)
Facility: OTHER | Age: 32
LOS: 1 days | Discharge: HOME | End: 2017-05-22
Attending: OBSTETRICS & GYNECOLOGY | Admitting: OBSTETRICS & GYNECOLOGY
Payer: COMMERCIAL
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
## 2017-05-21 DIAGNOSIS — Z3A.27 27 WEEKS GESTATION OF PREGNANCY: ICD-10-CM

## 2017-05-21 DIAGNOSIS — O47.9: Primary | ICD-10-CM

## 2017-05-21 LAB
ABO + RH BLD: NORMAL
BASOPHILS # BLD AUTO: 0.01 K/UL
BASOPHILS NFR BLD: 0.1 %
BLD GP AB SCN CELLS X3 SERPL QL: NORMAL
DIFFERENTIAL METHOD: ABNORMAL
EOSINOPHIL # BLD AUTO: 0 K/UL
EOSINOPHIL NFR BLD: 0.2 %
ERYTHROCYTE [DISTWIDTH] IN BLOOD BY AUTOMATED COUNT: 13.3 %
HCT VFR BLD AUTO: 35.7 %
HGB BLD-MCNC: 12.1 G/DL
LYMPHOCYTES # BLD AUTO: 1.6 K/UL
LYMPHOCYTES NFR BLD: 12.7 %
MCH RBC QN AUTO: 28.7 PG
MCHC RBC AUTO-ENTMCNC: 33.9 %
MCV RBC AUTO: 85 FL
MONOCYTES # BLD AUTO: 0.8 K/UL
MONOCYTES NFR BLD: 6 %
NEUTROPHILS # BLD AUTO: 10.3 K/UL
NEUTROPHILS NFR BLD: 80.5 %
PLATELET # BLD AUTO: 230 K/UL
PMV BLD AUTO: 10.7 FL
RBC # BLD AUTO: 4.22 M/UL
WBC # BLD AUTO: 12.76 K/UL

## 2017-05-21 PROCEDURE — 85025 COMPLETE CBC W/AUTO DIFF WBC: CPT

## 2017-05-21 PROCEDURE — 36415 COLL VENOUS BLD VENIPUNCTURE: CPT

## 2017-05-21 PROCEDURE — 11000001 HC ACUTE MED/SURG PRIVATE ROOM

## 2017-05-21 PROCEDURE — 99285 EMERGENCY DEPT VISIT HI MDM: CPT | Mod: 25

## 2017-05-21 PROCEDURE — 96361 HYDRATE IV INFUSION ADD-ON: CPT

## 2017-05-21 PROCEDURE — 86901 BLOOD TYPING SEROLOGIC RH(D): CPT

## 2017-05-21 PROCEDURE — 59025 FETAL NON-STRESS TEST: CPT

## 2017-05-21 PROCEDURE — 86900 BLOOD TYPING SEROLOGIC ABO: CPT

## 2017-05-21 PROCEDURE — 96360 HYDRATION IV INFUSION INIT: CPT

## 2017-05-21 PROCEDURE — 59025 FETAL NON-STRESS TEST: CPT | Mod: 26,,, | Performed by: OBSTETRICS & GYNECOLOGY

## 2017-05-21 PROCEDURE — 99284 EMERGENCY DEPT VISIT MOD MDM: CPT | Mod: 25,,, | Performed by: OBSTETRICS & GYNECOLOGY

## 2017-05-21 PROCEDURE — 25000003 PHARM REV CODE 250: Performed by: OBSTETRICS & GYNECOLOGY

## 2017-05-21 PROCEDURE — 63600175 PHARM REV CODE 636 W HCPCS: Performed by: OBSTETRICS & GYNECOLOGY

## 2017-05-21 RX ORDER — AMOXICILLIN 250 MG
1 CAPSULE ORAL NIGHTLY PRN
Status: DISCONTINUED | OUTPATIENT
Start: 2017-05-21 | End: 2017-05-22 | Stop reason: HOSPADM

## 2017-05-21 RX ORDER — SIMETHICONE 80 MG
1 TABLET,CHEWABLE ORAL EVERY 6 HOURS PRN
Status: DISCONTINUED | OUTPATIENT
Start: 2017-05-21 | End: 2017-05-22 | Stop reason: HOSPADM

## 2017-05-21 RX ORDER — NIFEDIPINE 10 MG/1
10 CAPSULE ORAL EVERY 6 HOURS
Status: DISCONTINUED | OUTPATIENT
Start: 2017-05-21 | End: 2017-05-22

## 2017-05-21 RX ORDER — DIPHENHYDRAMINE HCL 25 MG
25 CAPSULE ORAL EVERY 4 HOURS PRN
Status: DISCONTINUED | OUTPATIENT
Start: 2017-05-21 | End: 2017-05-22 | Stop reason: HOSPADM

## 2017-05-21 RX ORDER — ACETAMINOPHEN 325 MG/1
650 TABLET ORAL EVERY 8 HOURS PRN
Status: DISCONTINUED | OUTPATIENT
Start: 2017-05-21 | End: 2017-05-22 | Stop reason: HOSPADM

## 2017-05-21 RX ORDER — BETAMETHASONE SODIUM PHOSPHATE AND BETAMETHASONE ACETATE 3; 3 MG/ML; MG/ML
12 INJECTION, SUSPENSION INTRA-ARTICULAR; INTRALESIONAL; INTRAMUSCULAR; SOFT TISSUE ONCE
Status: COMPLETED | OUTPATIENT
Start: 2017-05-21 | End: 2017-05-21

## 2017-05-21 RX ORDER — PRENATAL WITH FERROUS FUM AND FOLIC ACID 3080; 920; 120; 400; 22; 1.84; 3; 20; 10; 1; 12; 200; 27; 25; 2 [IU]/1; [IU]/1; MG/1; [IU]/1; MG/1; MG/1; MG/1; MG/1; MG/1; MG/1; UG/1; MG/1; MG/1; MG/1; MG/1
1 TABLET ORAL DAILY
Status: DISCONTINUED | OUTPATIENT
Start: 2017-05-21 | End: 2017-05-22 | Stop reason: HOSPADM

## 2017-05-21 RX ORDER — SODIUM CHLORIDE, SODIUM LACTATE, POTASSIUM CHLORIDE, CALCIUM CHLORIDE 600; 310; 30; 20 MG/100ML; MG/100ML; MG/100ML; MG/100ML
INJECTION, SOLUTION INTRAVENOUS CONTINUOUS
Status: DISCONTINUED | OUTPATIENT
Start: 2017-05-21 | End: 2017-05-22 | Stop reason: HOSPADM

## 2017-05-21 RX ORDER — NIFEDIPINE 10 MG/1
20 CAPSULE ORAL ONCE
Status: COMPLETED | OUTPATIENT
Start: 2017-05-21 | End: 2017-05-21

## 2017-05-21 RX ORDER — BETAMETHASONE SODIUM PHOSPHATE AND BETAMETHASONE ACETATE 3; 3 MG/ML; MG/ML
12 INJECTION, SUSPENSION INTRA-ARTICULAR; INTRALESIONAL; INTRAMUSCULAR; SOFT TISSUE ONCE
Status: DISCONTINUED | OUTPATIENT
Start: 2017-05-22 | End: 2017-05-22

## 2017-05-21 RX ORDER — ONDANSETRON 8 MG/1
8 TABLET, ORALLY DISINTEGRATING ORAL EVERY 8 HOURS PRN
Status: DISCONTINUED | OUTPATIENT
Start: 2017-05-21 | End: 2017-05-22 | Stop reason: HOSPADM

## 2017-05-21 RX ORDER — DIPHENHYDRAMINE HYDROCHLORIDE 50 MG/ML
25 INJECTION INTRAMUSCULAR; INTRAVENOUS EVERY 4 HOURS PRN
Status: DISCONTINUED | OUTPATIENT
Start: 2017-05-21 | End: 2017-05-22 | Stop reason: HOSPADM

## 2017-05-21 RX ADMIN — SODIUM CHLORIDE, SODIUM LACTATE, POTASSIUM CHLORIDE, AND CALCIUM CHLORIDE: .6; .31; .03; .02 INJECTION, SOLUTION INTRAVENOUS at 01:05

## 2017-05-21 RX ADMIN — NIFEDIPINE 10 MG: 10 CAPSULE, LIQUID FILLED ORAL at 08:05

## 2017-05-21 RX ADMIN — NIFEDIPINE 20 MG: 10 CAPSULE, LIQUID FILLED ORAL at 03:05

## 2017-05-21 RX ADMIN — BETAMETHASONE SODIUM PHOSPHATE AND BETAMETHASONE ACETATE 12 MG: 3; 3 INJECTION, SUSPENSION INTRA-ARTICULAR; INTRALESIONAL; INTRAMUSCULAR at 03:05

## 2017-05-21 RX ADMIN — SODIUM CHLORIDE, SODIUM LACTATE, POTASSIUM CHLORIDE, AND CALCIUM CHLORIDE: .6; .31; .03; .02 INJECTION, SOLUTION INTRAVENOUS at 08:05

## 2017-05-21 NOTE — NURSING
Pt admitted to antepartum unit. Transferred via wheelchair to room 394. Report given to NIXON Hsieh RN.
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
## 2017-05-21 NOTE — PROGRESS NOTES
Discussed case with Dr. Diaz to ensure that magnesium not needed.  Would start if concerns in fetal variability or it looks like we are moving towards delivery.   Also, if procardia does not help contractions, could consider indomethacin.   Case also discussed with Dr. Zeina Ortega, DO
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
## 2017-05-22 ENCOUNTER — TELEPHONE (OUTPATIENT)
Dept: OBSTETRICS AND GYNECOLOGY | Facility: CLINIC | Age: 32
End: 2017-05-22

## 2017-05-22 VITALS
HEART RATE: 74 BPM | SYSTOLIC BLOOD PRESSURE: 107 MMHG | WEIGHT: 130 LBS | HEIGHT: 61 IN | OXYGEN SATURATION: 99 % | RESPIRATION RATE: 18 BRPM | TEMPERATURE: 97 F | DIASTOLIC BLOOD PRESSURE: 61 MMHG | BODY MASS INDEX: 24.55 KG/M2

## 2017-05-22 PROBLEM — R10.9 ABDOMINAL PAIN DURING PREGNANCY IN SECOND TRIMESTER: Status: RESOLVED | Noted: 2017-03-14 | Resolved: 2017-05-22

## 2017-05-22 PROBLEM — O99.013 ANEMIA AFFECTING PREGNANCY IN THIRD TRIMESTER: Status: ACTIVE | Noted: 2017-03-14

## 2017-05-22 PROBLEM — O26.892 ABDOMINAL PAIN DURING PREGNANCY IN SECOND TRIMESTER: Status: RESOLVED | Noted: 2017-03-14 | Resolved: 2017-05-22

## 2017-05-22 PROBLEM — Z3A.27 27 WEEKS GESTATION OF PREGNANCY: Status: ACTIVE | Noted: 2017-05-22

## 2017-05-22 LAB
BASOPHILS # BLD AUTO: 0.01 K/UL
BASOPHILS # BLD AUTO: 0.01 K/UL
BASOPHILS NFR BLD: 0.1 %
BASOPHILS NFR BLD: 0.1 %
DIFFERENTIAL METHOD: ABNORMAL
DIFFERENTIAL METHOD: ABNORMAL
EOSINOPHIL # BLD AUTO: 0 K/UL
EOSINOPHIL # BLD AUTO: 0 K/UL
EOSINOPHIL NFR BLD: 0 %
EOSINOPHIL NFR BLD: 0 %
ERYTHROCYTE [DISTWIDTH] IN BLOOD BY AUTOMATED COUNT: 13.2 %
ERYTHROCYTE [DISTWIDTH] IN BLOOD BY AUTOMATED COUNT: 13.5 %
HCT VFR BLD AUTO: 32.3 %
HCT VFR BLD AUTO: 32.7 %
HGB BLD-MCNC: 10.8 G/DL
HGB BLD-MCNC: 11 G/DL
LYMPHOCYTES # BLD AUTO: 0.9 K/UL
LYMPHOCYTES # BLD AUTO: 1.1 K/UL
LYMPHOCYTES NFR BLD: 6 %
LYMPHOCYTES NFR BLD: 6.3 %
MCH RBC QN AUTO: 28.1 PG
MCH RBC QN AUTO: 28.5 PG
MCHC RBC AUTO-ENTMCNC: 33.4 %
MCHC RBC AUTO-ENTMCNC: 33.6 %
MCV RBC AUTO: 84 FL
MCV RBC AUTO: 85 FL
MONOCYTES # BLD AUTO: 0.3 K/UL
MONOCYTES # BLD AUTO: 0.9 K/UL
MONOCYTES NFR BLD: 2.3 %
MONOCYTES NFR BLD: 5.1 %
NEUTROPHILS # BLD AUTO: 12.7 K/UL
NEUTROPHILS # BLD AUTO: 15.7 K/UL
NEUTROPHILS NFR BLD: 88.3 %
NEUTROPHILS NFR BLD: 90.8 %
PLATELET # BLD AUTO: 214 K/UL
PLATELET # BLD AUTO: 243 K/UL
PMV BLD AUTO: 10 FL
PMV BLD AUTO: 10.5 FL
RBC # BLD AUTO: 3.84 M/UL
RBC # BLD AUTO: 3.86 M/UL
WBC # BLD AUTO: 13.94 K/UL
WBC # BLD AUTO: 17.75 K/UL

## 2017-05-22 PROCEDURE — 36415 COLL VENOUS BLD VENIPUNCTURE: CPT

## 2017-05-22 PROCEDURE — 85025 COMPLETE CBC W/AUTO DIFF WBC: CPT

## 2017-05-22 PROCEDURE — 99238 HOSP IP/OBS DSCHRG MGMT 30/<: CPT | Mod: ,,, | Performed by: OBSTETRICS & GYNECOLOGY

## 2017-05-22 PROCEDURE — 59025 FETAL NON-STRESS TEST: CPT

## 2017-05-22 PROCEDURE — 25000003 PHARM REV CODE 250: Performed by: OBSTETRICS & GYNECOLOGY

## 2017-05-22 PROCEDURE — 63600175 PHARM REV CODE 636 W HCPCS: Performed by: OBSTETRICS & GYNECOLOGY

## 2017-05-22 RX ORDER — FAMOTIDINE 20 MG/1
20 TABLET, FILM COATED ORAL 2 TIMES DAILY
Status: CANCELLED | OUTPATIENT
Start: 2017-05-22

## 2017-05-22 RX ORDER — MUPIROCIN 20 MG/G
1 OINTMENT TOPICAL 2 TIMES DAILY
Status: CANCELLED | OUTPATIENT
Start: 2017-05-22 | End: 2017-05-27

## 2017-05-22 RX ORDER — BETAMETHASONE SODIUM PHOSPHATE AND BETAMETHASONE ACETATE 3; 3 MG/ML; MG/ML
12 INJECTION, SUSPENSION INTRA-ARTICULAR; INTRALESIONAL; INTRAMUSCULAR; SOFT TISSUE ONCE
Status: COMPLETED | OUTPATIENT
Start: 2017-05-22 | End: 2017-05-22

## 2017-05-22 RX ADMIN — SODIUM CHLORIDE, SODIUM LACTATE, POTASSIUM CHLORIDE, AND CALCIUM CHLORIDE: .6; .31; .03; .02 INJECTION, SOLUTION INTRAVENOUS at 04:05

## 2017-05-22 RX ADMIN — ACETAMINOPHEN 650 MG: 325 TABLET ORAL at 04:05

## 2017-05-22 RX ADMIN — Medication 1 EACH: at 10:05

## 2017-05-22 RX ADMIN — BETAMETHASONE SODIUM PHOSPHATE AND BETAMETHASONE ACETATE 12 MG: 3; 3 INJECTION, SUSPENSION INTRA-ARTICULAR; INTRALESIONAL; INTRAMUSCULAR at 12:05

## 2017-05-22 RX ADMIN — NIFEDIPINE 10 MG: 10 CAPSULE, LIQUID FILLED ORAL at 05:05
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
## 2017-05-22 NOTE — ASSESSMENT & PLAN NOTE
Patient does not report uterine pain. She is at risk for pre-term labor due to the fibroid uterus, and she is aware of that risk as well as the s/s to notify us.

## 2017-05-22 NOTE — PLAN OF CARE
Problem: Patient Care Overview  Goal: Plan of Care Review  Outcome: Ongoing (interventions implemented as appropriate)  Pt doing well, no acute changes during this shift, vital signs stable, no signs of distress, pt report positive fetal movement, pt denies contraction, vaginal bleeding, and LOF. Will continue to monitor patient

## 2017-05-30 ENCOUNTER — LAB VISIT (OUTPATIENT)
Dept: LAB | Facility: HOSPITAL | Age: 32
End: 2017-05-30
Attending: OBSTETRICS & GYNECOLOGY
Payer: COMMERCIAL

## 2017-05-30 ENCOUNTER — ROUTINE PRENATAL (OUTPATIENT)
Dept: OBSTETRICS AND GYNECOLOGY | Facility: CLINIC | Age: 32
End: 2017-05-30
Payer: COMMERCIAL

## 2017-05-30 ENCOUNTER — OFFICE VISIT (OUTPATIENT)
Dept: MATERNAL FETAL MEDICINE | Facility: CLINIC | Age: 32
End: 2017-05-30
Attending: OBSTETRICS & GYNECOLOGY
Payer: COMMERCIAL

## 2017-05-30 VITALS
WEIGHT: 134.06 LBS | BODY MASS INDEX: 25.33 KG/M2 | SYSTOLIC BLOOD PRESSURE: 116 MMHG | DIASTOLIC BLOOD PRESSURE: 78 MMHG

## 2017-05-30 DIAGNOSIS — O34.10 UTERINE FIBROID IN PREGNANCY: ICD-10-CM

## 2017-05-30 DIAGNOSIS — D25.9 UTERINE FIBROID IN PREGNANCY: ICD-10-CM

## 2017-05-30 DIAGNOSIS — Z34.03 ENCOUNTER FOR SUPERVISION OF NORMAL FIRST PREGNANCY IN THIRD TRIMESTER: ICD-10-CM

## 2017-05-30 DIAGNOSIS — D21.9 MYOMA: ICD-10-CM

## 2017-05-30 DIAGNOSIS — Z36.89 ENCOUNTER FOR ULTRASOUND TO CHECK FETAL GROWTH: ICD-10-CM

## 2017-05-30 DIAGNOSIS — Z34.03 ENCOUNTER FOR SUPERVISION OF NORMAL FIRST PREGNANCY IN THIRD TRIMESTER: Primary | ICD-10-CM

## 2017-05-30 LAB
BASOPHILS # BLD AUTO: 0.03 K/UL
BASOPHILS NFR BLD: 0.2 %
DIFFERENTIAL METHOD: ABNORMAL
EOSINOPHIL # BLD AUTO: 0.1 K/UL
EOSINOPHIL NFR BLD: 0.6 %
ERYTHROCYTE [DISTWIDTH] IN BLOOD BY AUTOMATED COUNT: 13.7 %
GLUCOSE SERPL-MCNC: 112 MG/DL
HCT VFR BLD AUTO: 37.1 %
HGB BLD-MCNC: 12.2 G/DL
LYMPHOCYTES # BLD AUTO: 1.9 K/UL
LYMPHOCYTES NFR BLD: 15.4 %
MCH RBC QN AUTO: 28.6 PG
MCHC RBC AUTO-ENTMCNC: 32.9 %
MCV RBC AUTO: 87 FL
MONOCYTES # BLD AUTO: 0.8 K/UL
MONOCYTES NFR BLD: 6.7 %
NEUTROPHILS # BLD AUTO: 9.6 K/UL
NEUTROPHILS NFR BLD: 76 %
PLATELET # BLD AUTO: 275 K/UL
PMV BLD AUTO: 10.9 FL
RBC # BLD AUTO: 4.26 M/UL
WBC # BLD AUTO: 12.63 K/UL

## 2017-05-30 PROCEDURE — 0502F SUBSEQUENT PRENATAL CARE: CPT | Mod: S$GLB,,, | Performed by: OBSTETRICS & GYNECOLOGY

## 2017-05-30 PROCEDURE — 99499 UNLISTED E&M SERVICE: CPT | Mod: S$GLB,,, | Performed by: OBSTETRICS & GYNECOLOGY

## 2017-05-30 PROCEDURE — 82950 GLUCOSE TEST: CPT

## 2017-05-30 PROCEDURE — 85025 COMPLETE CBC W/AUTO DIFF WBC: CPT

## 2017-05-30 PROCEDURE — 99999 PR PBB SHADOW E&M-EST. PATIENT-LVL I: CPT | Mod: PBBFAC,,, | Performed by: OBSTETRICS & GYNECOLOGY

## 2017-05-30 PROCEDURE — 76816 OB US FOLLOW-UP PER FETUS: CPT | Mod: S$GLB,,, | Performed by: OBSTETRICS & GYNECOLOGY

## 2017-05-30 NOTE — PROGRESS NOTES
Indication  ========    Evaluation of fetal growth.    History  ======    General History  Other: Dr. Mohan Pastrana  Risk Factors  Details: Fibroids    Pregnancy History  ==============    Maternal Lab Tests  Test: Sequential Screen  Result: negative; 1:14,000    Method  ======    Transabdominal ultrasound examination, [place] E10. View: Suboptimal view: limited by fetal position.    Pregnancy  =========    Allen pregnancy. Number of fetuses: 1.    Dating  ======    LMP on: 11/12/2016  Cycle: regular cycle  GA by LMP 28 w + 3 d  AYANNA by LMP: 8/19/2017  Ultrasound examination on: 5/30/2017  GA by U/S based upon: AC, BPD, Femur, HC  GA by U/S 28 w + 5 d  AYANNA by U/S: 8/17/2017  Assigned: The Best Overall Assessment is based on the LMP.  Assigned GA 28 w + 3 d  Assigned AYANNA: 8/19/2017    General Evaluation  ==============    Cardiac activity: present.  bpm.  Fetal movements: visualized.  Presentation: breech, oblique.  Placenta: anterior, right.  Umbilical cord: 3 vessel cord.  Amniotic fluid: Amount of AF: normal amount. MVP 6.4 cm.    Fetal Biometry  ============    Fetal Biometry  BPD 69.5 mm 28w 0d Hadlock  OFD 98.0 mm 31w 5d Alicia  .1 mm 29w 4d Hadlock  .6 mm 29w 0d Hadlock  Femur 53.3 mm 28w 2d Hadlock  CM 5.5 mm  EFW 1,276 g 49% 28w 3d Hadlock  Calculated by: Hadlock (BPD-HC-AC-FL)  EFW (lb) 2 lb  EFW (oz) 13 oz  Cephalic index 0.71  HC / AC 1.09  FL / BPD 0.77  FL / AC 0.22  MVP 6.4 cm   bpm  Head / Face / Neck   5.3 mm    Fetal Anatomy  ============    Cranium: normal  Lateral ventricles: normal  Choroid plexus: normal  Midline falx: normal  Cavum septi pellucidi: normal  Cerebellum: normal  Cisterna magna: normal  4-chamber view: documented previously  Stomach: normal  Bladder: normal  Lt kidney: difficult to visualize due to fetal position; it was documented previously  Arms: both visualized  Legs: both visualized    Maternal Structures  ===============    Uterus /  Cervix  Fibroids: Fibroids identified  Findings: Intramural. Left lateral wall  D1 93.5 mm  D2 111.7 mm  D3 94.0 mm  Mean 99.7 mm  Vol 513.596 cm³  Findings: Pedunculated. Anterior  D1 39.2 mm  D2 34.7 mm  D3 67.9 mm  Mean 47.3 mm  Vol 48.287 cm³    Impression  =========    Allen live intrauterine pregnancy.  Overall normal fetal growth.  Amniotic fluid volume is normal.  Large myomas again noted. Overall fairly stable in size.  Limited anatomy appeared normal.    Recommendation  ==============    Follow up ultrasound with MFM in 4 weeks for growth.

## 2017-05-30 NOTE — LETTER
May 30, 2017      Mohan Pastrana MD  [address]  [address]  [facility] [address]           [facility]  [address]  [facility] [address]  Phone: [phone]          Patient: Olga Prater   MR Number: [id number]   YOB: 1985   Date of Visit: 5/30/2017       Dear Dr. Mohan Pastrana:    Thank you for referring Olga Prater to me for evaluation. Attached you will find relevant portions of my assessment and plan of care.    If you have questions, please do not hesitate to call me. I look forward to following Olga Prater along with you.    Sincerely,    Evi Palomino MD    Enclosure  CC:  No Recipients    If you would like to receive this communication electronically, please contact [email or web address] or [phone] to request more information on [employer] Link access.    For providers and/or their staff who would like to refer a patient to [facility], please contact us through our one-stop-shop provider referral line, [facility], at [phone].    If you feel you have received this communication in error or would no longer like to receive these types of communications, please e-mail [email or web address]
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
## 2017-06-01 ENCOUNTER — TELEPHONE (OUTPATIENT)
Dept: OBSTETRICS AND GYNECOLOGY | Facility: CLINIC | Age: 32
End: 2017-06-01

## 2017-06-01 NOTE — TELEPHONE ENCOUNTER
----- Message from Mohan Pastrana MD sent at 6/1/2017  1:05 PM CDT -----  Call patient passed DM screen and cbc normal.  Baby was breech on last ultrasound so we will see what happens later but I'm thinking low chance since large fibroid

## 2017-06-06 ENCOUNTER — ROUTINE PRENATAL (OUTPATIENT)
Dept: OBSTETRICS AND GYNECOLOGY | Facility: CLINIC | Age: 32
End: 2017-06-06
Payer: COMMERCIAL

## 2017-06-06 VITALS
BODY MASS INDEX: 25.74 KG/M2 | DIASTOLIC BLOOD PRESSURE: 62 MMHG | SYSTOLIC BLOOD PRESSURE: 112 MMHG | WEIGHT: 136.25 LBS

## 2017-06-06 DIAGNOSIS — Z34.03 ENCOUNTER FOR SUPERVISION OF NORMAL FIRST PREGNANCY IN THIRD TRIMESTER: Primary | ICD-10-CM

## 2017-06-06 PROCEDURE — 99999 PR PBB SHADOW E&M-EST. PATIENT-LVL II: CPT | Mod: PBBFAC,,, | Performed by: OBSTETRICS & GYNECOLOGY

## 2017-06-06 PROCEDURE — 0502F SUBSEQUENT PRENATAL CARE: CPT | Mod: S$GLB,,, | Performed by: OBSTETRICS & GYNECOLOGY

## 2017-06-20 ENCOUNTER — CLINICAL SUPPORT (OUTPATIENT)
Dept: OBSTETRICS AND GYNECOLOGY | Facility: CLINIC | Age: 32
End: 2017-06-20
Payer: COMMERCIAL

## 2017-06-20 ENCOUNTER — ROUTINE PRENATAL (OUTPATIENT)
Dept: OBSTETRICS AND GYNECOLOGY | Facility: CLINIC | Age: 32
End: 2017-06-20
Payer: COMMERCIAL

## 2017-06-20 VITALS
DIASTOLIC BLOOD PRESSURE: 76 MMHG | SYSTOLIC BLOOD PRESSURE: 114 MMHG | WEIGHT: 140.31 LBS | BODY MASS INDEX: 26.51 KG/M2

## 2017-06-20 DIAGNOSIS — Z34.03 ENCOUNTER FOR SUPERVISION OF NORMAL FIRST PREGNANCY IN THIRD TRIMESTER: Primary | ICD-10-CM

## 2017-06-20 DIAGNOSIS — Z23 NEED FOR TDAP VACCINATION: Primary | ICD-10-CM

## 2017-06-20 PROCEDURE — 90471 IMMUNIZATION ADMIN: CPT | Mod: S$GLB,,, | Performed by: OBSTETRICS & GYNECOLOGY

## 2017-06-20 PROCEDURE — 0502F SUBSEQUENT PRENATAL CARE: CPT | Mod: S$GLB,,, | Performed by: OBSTETRICS & GYNECOLOGY

## 2017-06-20 PROCEDURE — 99999 PR PBB SHADOW E&M-EST. PATIENT-LVL II: CPT | Mod: PBBFAC,,, | Performed by: OBSTETRICS & GYNECOLOGY

## 2017-06-20 PROCEDURE — 90715 TDAP VACCINE 7 YRS/> IM: CPT | Mod: S$GLB,,, | Performed by: OBSTETRICS & GYNECOLOGY

## 2017-06-20 PROCEDURE — 99999 PR PBB SHADOW E&M-EST. PATIENT-LVL I: CPT | Mod: PBBFAC,,,

## 2017-06-20 NOTE — PROGRESS NOTES
Ordering Physician: Zeina    Order Type: Verbal     During visit today patient received injection of tdap to left deltoid. Patient tolerated well, no allergic reaction noted. Requested patient to remain 10 minutes after injection.     Pre-Pain Scale:None     Post Pain Scale:None

## 2017-06-21 ENCOUNTER — PATIENT MESSAGE (OUTPATIENT)
Dept: OBSTETRICS AND GYNECOLOGY | Facility: CLINIC | Age: 32
End: 2017-06-21

## 2017-06-21 DIAGNOSIS — N63.0 BREAST MASS IN FEMALE: Primary | ICD-10-CM

## 2017-06-21 DIAGNOSIS — N60.19 FIBROCYSTIC BREAST, UNSPECIFIED LATERALITY: ICD-10-CM
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
## 2017-06-28 ENCOUNTER — HOSPITAL ENCOUNTER (OUTPATIENT)
Dept: RADIOLOGY | Facility: HOSPITAL | Age: 32
Discharge: HOME | End: 2017-06-28
Attending: OBSTETRICS & GYNECOLOGY
Payer: COMMERCIAL

## 2017-06-28 ENCOUNTER — OFFICE VISIT (OUTPATIENT)
Dept: MATERNAL FETAL MEDICINE | Facility: CLINIC | Age: 32
End: 2017-06-28
Payer: COMMERCIAL

## 2017-06-28 VITALS — BODY MASS INDEX: 26.43 KG/M2 | HEIGHT: 61 IN | WEIGHT: 140 LBS

## 2017-06-28 DIAGNOSIS — N60.19 FIBROCYSTIC BREAST, UNSPECIFIED LATERALITY: ICD-10-CM

## 2017-06-28 DIAGNOSIS — Z36.89 ENCOUNTER FOR ULTRASOUND TO CHECK FETAL GROWTH: Primary | ICD-10-CM

## 2017-06-28 DIAGNOSIS — N63.0 BREAST MASS IN FEMALE: ICD-10-CM

## 2017-06-28 DIAGNOSIS — O34.13 UTERINE FIBROID COMPLICATING ANTENATAL CARE, BABY NOT YET DELIVERED, THIRD TRIMESTER: ICD-10-CM

## 2017-06-28 DIAGNOSIS — D25.9 UTERINE FIBROID COMPLICATING ANTENATAL CARE, BABY NOT YET DELIVERED, THIRD TRIMESTER: ICD-10-CM

## 2017-06-28 PROCEDURE — 76816 OB US FOLLOW-UP PER FETUS: CPT | Mod: S$GLB,,, | Performed by: OBSTETRICS & GYNECOLOGY

## 2017-06-28 PROCEDURE — 76642 ULTRASOUND BREAST LIMITED: CPT | Mod: TC,RT

## 2017-06-28 PROCEDURE — 99499 UNLISTED E&M SERVICE: CPT | Mod: S$GLB,,, | Performed by: OBSTETRICS & GYNECOLOGY

## 2017-06-28 PROCEDURE — 76642 ULTRASOUND BREAST LIMITED: CPT | Mod: 26,RT,, | Performed by: RADIOLOGY

## 2017-06-28 NOTE — PROGRESS NOTES
OB Ultrasound Report (see PDF version under imaging tab)    Indication  ========    Follow-up evaluation for fetal growth.    History  ======    General History  Other: Dr. Mohan Pastrana  Risk Factors  Details: Fibroids    Pregnancy History  ===============    Maternal Lab Tests  Test: Sequential Screen  Result: negative; 1:14,000    Method  ======    Transabdominal ultrasound examination. View: Sufficient.    Pregnancy  =========    Allen pregnancy. Number of fetuses: 1.    Dating  ======    LMP on: 11/12/2016  Cycle: regular cycle  GA by LMP 32 w + 4 d  AYANNA by LMP: 8/19/2017  Ultrasound examination on: 6/28/2017  GA by U/S based upon: AC, BPD, Femur, HC  GA by U/S 33 w + 3 d  AYANNA by U/S: 8/13/2017  Assigned: The Best Overall Assessment is based on the LMP.  Assigned GA 32 w + 4 d  Assigned AYANNA: 8/19/2017    General Evaluation  ===============    Cardiac activity: present.  bpm.  Fetal movements: visualized.  Placenta: anterior.  Umbilical cord: 3 vessel cord.  Amniotic fluid: MVP 6.4 cm.    Fetal Biometry  ===========    Fetal Biometry  BPD 78.2 mm 31w 3d Hadlock  .0 mm 35w 2d Alicia  .7 mm 33w 1d Hadlock  .9 mm 36w 1d Hadlock  Femur 63.6 mm 32w 6d Hadlock  EFW 2,442 g 55% Curt  Calculated by: Hadlock (BPD-HC-AC-FL)  EFW (lb) 5 lb  EFW (oz) 6 oz  Cephalic index 0.73  HC / AC 0.93  FL / BPD 0.81  FL / AC 0.20  MVP 6.4 cm   bpm    Fetal Anatomy  ===========    Cranium: normal  Stomach: normal  Kidneys: normal  Bladder: normal  Other: A full anatomic survey has been previously performed.    Maternal Structures  ===============    Uterus / Cervix  Fibroids: Fibroids identified  Findings: Left lateral wall  D1 96.3 mm  D2 104.8 mm  D3 100.0 mm  Mean 100.4 mm  Vol 528.478 cm³  Findings: Anterior  D1 65.6 mm  D2 37.7 mm  D3 35.4 mm  Mean 46.2 mm  Vol 45.841 cm³    Impression  =========    1. Fetal size is AGA with the EFW at the 55th percentile.  2. Normal repeat limited fetal  anatomic survey. AFV is normal. Fetal presentation is vertex.  3. Stable large uterine myomas  4. Follow-up ultrasound planned in 3 - 4 weeks to assess fetal growth due to the presence of a large uterine myomas.

## 2017-07-07 ENCOUNTER — ROUTINE PRENATAL (OUTPATIENT)
Dept: OBSTETRICS AND GYNECOLOGY | Facility: CLINIC | Age: 32
End: 2017-07-07
Attending: OBSTETRICS & GYNECOLOGY
Payer: COMMERCIAL

## 2017-07-07 VITALS — WEIGHT: 146.06 LBS | BODY MASS INDEX: 27.6 KG/M2 | SYSTOLIC BLOOD PRESSURE: 118 MMHG | DIASTOLIC BLOOD PRESSURE: 68 MMHG

## 2017-07-07 DIAGNOSIS — Z34.03 ENCOUNTER FOR SUPERVISION OF NORMAL FIRST PREGNANCY IN THIRD TRIMESTER: ICD-10-CM

## 2017-07-07 DIAGNOSIS — Z3A.33 33 WEEKS GESTATION OF PREGNANCY: Primary | ICD-10-CM

## 2017-07-07 PROCEDURE — 99999 PR PBB SHADOW E&M-EST. PATIENT-LVL II: CPT | Mod: PBBFAC,,, | Performed by: NURSE PRACTITIONER

## 2017-07-07 PROCEDURE — 0502F SUBSEQUENT PRENATAL CARE: CPT | Mod: S$GLB,,, | Performed by: NURSE PRACTITIONER

## 2017-07-18 ENCOUNTER — ROUTINE PRENATAL (OUTPATIENT)
Dept: OBSTETRICS AND GYNECOLOGY | Facility: CLINIC | Age: 32
End: 2017-07-18
Payer: COMMERCIAL

## 2017-07-18 VITALS
DIASTOLIC BLOOD PRESSURE: 82 MMHG | SYSTOLIC BLOOD PRESSURE: 124 MMHG | BODY MASS INDEX: 28.28 KG/M2 | WEIGHT: 149.69 LBS

## 2017-07-18 DIAGNOSIS — Z34.03 ENCOUNTER FOR SUPERVISION OF NORMAL FIRST PREGNANCY IN THIRD TRIMESTER: Primary | ICD-10-CM

## 2017-07-18 DIAGNOSIS — Z36.85 ANTENATAL SCREENING FOR STREPTOCOCCUS B: ICD-10-CM

## 2017-07-18 PROCEDURE — 99999 PR PBB SHADOW E&M-EST. PATIENT-LVL I: CPT | Mod: PBBFAC,,, | Performed by: OBSTETRICS & GYNECOLOGY

## 2017-07-18 PROCEDURE — 0502F SUBSEQUENT PRENATAL CARE: CPT | Mod: S$GLB,,, | Performed by: OBSTETRICS & GYNECOLOGY

## 2017-07-18 PROCEDURE — 87081 CULTURE SCREEN ONLY: CPT

## 2017-07-18 NOTE — PROGRESS NOTES
No c/o.  On exam today fibroid is out of the pelvis recommend TOMMY if baby remains vts.  Pt agrees.  GBBS done. Has growth scan next week. Consents done.

## 2017-07-21 LAB — BACTERIA SPEC AEROBE CULT: NORMAL

## 2017-07-25 ENCOUNTER — OFFICE VISIT (OUTPATIENT)
Dept: MATERNAL FETAL MEDICINE | Facility: CLINIC | Age: 32
End: 2017-07-25
Attending: OBSTETRICS & GYNECOLOGY
Payer: COMMERCIAL

## 2017-07-25 ENCOUNTER — ROUTINE PRENATAL (OUTPATIENT)
Dept: OBSTETRICS AND GYNECOLOGY | Facility: CLINIC | Age: 32
End: 2017-07-25
Payer: COMMERCIAL

## 2017-07-25 VITALS
DIASTOLIC BLOOD PRESSURE: 86 MMHG | WEIGHT: 147.63 LBS | BODY MASS INDEX: 27.89 KG/M2 | SYSTOLIC BLOOD PRESSURE: 122 MMHG

## 2017-07-25 DIAGNOSIS — D21.9 MYOMA: ICD-10-CM

## 2017-07-25 DIAGNOSIS — Z36.89 ENCOUNTER FOR ULTRASOUND TO CHECK FETAL GROWTH: ICD-10-CM

## 2017-07-25 DIAGNOSIS — Z34.03 ENCOUNTER FOR SUPERVISION OF NORMAL FIRST PREGNANCY IN THIRD TRIMESTER: Primary | ICD-10-CM

## 2017-07-25 PROCEDURE — 99999 PR PBB SHADOW E&M-EST. PATIENT-LVL II: CPT | Mod: PBBFAC,,, | Performed by: OBSTETRICS & GYNECOLOGY

## 2017-07-25 PROCEDURE — 0502F SUBSEQUENT PRENATAL CARE: CPT | Mod: S$GLB,,, | Performed by: OBSTETRICS & GYNECOLOGY

## 2017-07-25 PROCEDURE — 99499 UNLISTED E&M SERVICE: CPT | Mod: S$GLB,,, | Performed by: OBSTETRICS & GYNECOLOGY

## 2017-07-25 PROCEDURE — 76816 OB US FOLLOW-UP PER FETUS: CPT | Mod: S$GLB,,, | Performed by: OBSTETRICS & GYNECOLOGY

## 2017-07-25 NOTE — LETTER
July 25, 2017      Mohan Pastrana MD  [address]  [address]  [facility] [address]           [facility] - Maternal Fetal [facility]  [address]  [facility] [address]  Phone: [phone]          Patient: Olga Prater   MR Number: [id number]   YOB: 1985   Date of Visit: 7/25/2017       Dear Dr. Mohan Pastrana:    Thank you for referring Olga Prater to me for evaluation. Attached you will find relevant portions of my assessment and plan of care.    If you have questions, please do not hesitate to call me. I look forward to following Ogla Prater along with you.    Sincerely,    Evi Palomino MD    Enclosure  CC:  No Recipients    If you would like to receive this communication electronically, please contact [email or web address] or [phone] to request more information on [employer] Link access.    For providers and/or their staff who would like to refer a patient to [facility], please contact us through our one-stop-shop provider referral line, [facility], at [phone].    If you feel you have received this communication in error or would no longer like to receive these types of communications, please e-mail [email or web address]

## 2017-07-25 NOTE — PROGRESS NOTES
Indication  ========    Evaluation of fetal growth.    History  ======    General History  Other: Dr. Mohan Pastrana  Risk Factors  Details: Fibroids    Pregnancy History  ==============    Maternal Lab Tests  Test: Sequential Screen  Result: negative; 1:14,000    Method  ======    Transabdominal ultrasound examination, [place] E10. View: Good view.    Pregnancy  =========    Allen pregnancy. Number of fetuses: 1.    Dating  ======    LMP on: 11/12/2016  Cycle: regular cycle  GA by LMP 36 w + 3 d  AYANNA by LMP: 8/19/2017  Ultrasound examination on: 7/25/2017  GA by U/S based upon: AC, BPD, Femur, HC  GA by U/S 35 w + 5 d  AYANNA by U/S: 8/24/2017  Assigned: The Best Overall Assessment is based on the LMP.  Assigned GA 36 w + 3 d  Assigned AYANNA: 8/19/2017    General Evaluation  ==============    Cardiac activity: present.  bpm.  Fetal movements: visualized.  Presentation: cephalic.  Placenta: anterior.  Umbilical cord: 3 vessel cord.  Amniotic fluid: Amount of AF: normal amount. MVP 5.6 cm.    Fetal Biometry  ============    Fetal Biometry  BPD 85.2 mm 34w 2d Hadlock  .8 mm 38w 3d Alicia  .7 mm 35w 5d Hadlock  .6 mm 36w 5d Hadlock  Femur 69.9 mm 35w 6d Hadlock  EFW 2,848 g 34% Curt  Calculated by: Hadlock (BPD-HC-AC-FL)  EFW (lb) 6 lb  EFW (oz) 4 oz  Cephalic index 0.76  HC / AC 0.97  FL / BPD 0.82  FL / AC 0.21  MVP 5.6 cm   bpm    Fetal Anatomy  ============    Cranium: normal  Posterior fossa: normal  4-chamber view: normal  Stomach: normal  Kidneys: normal  Bladder: normal  Arms: both visualized  Legs: both visualized  Other: A full anatomy survey previously performed.    Maternal Structures  ===============    Uterus / Cervix  Uterus: Abnormal  Fibroids: Fibroids identified  Findings: Intramural. Fundal. Left lateral wall  D1 98.5 mm  D2 88.0 mm  D3 98.6 mm  Mean 95.0 mm  Vol 447.232 cm³  Findings: Intramural. Anterior. Fundal  D1 45.7 mm  D2 30.6 mm  D3 70.6 mm  Mean 49.0  mm  Vol 51.721 cm³    Impression  =========    Allen live intrauterine pregnancy.  Overall fetal growth is normal.  Amniotic fluid volume is normal.  Limited anatomy is normal.  Fibroids are large but are stable in size.    Recommendation  ==============    Follow up as clinically indicated.

## 2017-08-01 ENCOUNTER — ROUTINE PRENATAL (OUTPATIENT)
Dept: OBSTETRICS AND GYNECOLOGY | Facility: CLINIC | Age: 32
End: 2017-08-01
Payer: COMMERCIAL

## 2017-08-01 ENCOUNTER — LAB VISIT (OUTPATIENT)
Dept: LAB | Facility: OTHER | Age: 32
End: 2017-08-01
Attending: NURSE PRACTITIONER
Payer: COMMERCIAL

## 2017-08-01 VITALS
WEIGHT: 151.13 LBS | DIASTOLIC BLOOD PRESSURE: 80 MMHG | SYSTOLIC BLOOD PRESSURE: 122 MMHG | BODY MASS INDEX: 28.55 KG/M2

## 2017-08-01 DIAGNOSIS — Z3A.37 37 WEEKS GESTATION OF PREGNANCY: Primary | ICD-10-CM

## 2017-08-01 DIAGNOSIS — Z3A.37 37 WEEKS GESTATION OF PREGNANCY: ICD-10-CM

## 2017-08-01 LAB
BASOPHILS # BLD AUTO: 0.02 K/UL
BASOPHILS NFR BLD: 0.2 %
DIFFERENTIAL METHOD: ABNORMAL
EOSINOPHIL # BLD AUTO: 0.1 K/UL
EOSINOPHIL NFR BLD: 0.7 %
ERYTHROCYTE [DISTWIDTH] IN BLOOD BY AUTOMATED COUNT: 13.8 %
HCT VFR BLD AUTO: 37.2 %
HGB BLD-MCNC: 12.4 G/DL
HIV 1+2 AB+HIV1 P24 AG SERPL QL IA: NEGATIVE
LYMPHOCYTES # BLD AUTO: 1.4 K/UL
LYMPHOCYTES NFR BLD: 13.4 %
MCH RBC QN AUTO: 28.4 PG
MCHC RBC AUTO-ENTMCNC: 33.3 G/DL
MCV RBC AUTO: 85 FL
MONOCYTES # BLD AUTO: 0.8 K/UL
MONOCYTES NFR BLD: 7.7 %
NEUTROPHILS # BLD AUTO: 8.3 K/UL
NEUTROPHILS NFR BLD: 77.4 %
PLATELET # BLD AUTO: 234 K/UL
PMV BLD AUTO: 11.5 FL
RBC # BLD AUTO: 4.36 M/UL
RPR SER QL: NORMAL
WBC # BLD AUTO: 10.71 K/UL

## 2017-08-01 PROCEDURE — 0502F SUBSEQUENT PRENATAL CARE: CPT | Mod: S$GLB,,, | Performed by: NURSE PRACTITIONER

## 2017-08-01 PROCEDURE — 36415 COLL VENOUS BLD VENIPUNCTURE: CPT

## 2017-08-01 PROCEDURE — 99999 PR PBB SHADOW E&M-EST. PATIENT-LVL III: CPT | Mod: PBBFAC,,, | Performed by: NURSE PRACTITIONER

## 2017-08-01 PROCEDURE — 86703 HIV-1/HIV-2 1 RESULT ANTBDY: CPT

## 2017-08-01 PROCEDURE — 86592 SYPHILIS TEST NON-TREP QUAL: CPT

## 2017-08-01 PROCEDURE — 85025 COMPLETE CBC W/AUTO DIFF WBC: CPT

## 2017-08-01 NOTE — PROGRESS NOTES
Chief Complaint: Third Trimester Obstetric Visit (27 wks-Delivery)    HPI: Olga Prater is a 31 y.o., , at 37w3d wks here for a routine prenatal visit. She denies any vaginal bleeding, leaking fluids, abnormal vaginal discharge,  or GI complaints. Cable Perez contractions are reported by patient. Fetal movement detected at this time.     Physical Exam:   FHT: 157  FH: 35.5  Cervix: 1 CM  /80   Wt 68.6 kg (151 lb 2 oz)   LMP 2016   BMI 28.55 kg/m²     Assessment/Plan  1. Third Trimester Pregnancy Routine Visit--patient here for routine prenatal visit doing well. Continue daily prenatal vitamin, second trimester ACOG education topics discussed and handout provided.; 3T labs ordered; Fetal growth scan on the 25th overall normal with stable large fibroids  2. Circ: Yes  3. PPBCM: discussed options and pt will think about options  4. GBS screening--screening discussed and negative  5. Pediatrician and Infant Feeding--discussed breastfeeding with patient and recommended class. Patient has Dr. Griffith   6. Fetal Movement--discussed and handout provided to patient on monitoring fetal movement  7. Labor--discussed real vs false labor and when to report to L&D or call office    RTC prn as schedule with OBMD

## 2017-08-01 NOTE — PATIENT INSTRUCTIONS
FETAL KICK COUNTS  You are the best monitor for how well your baby is doing. The American Congress of Obstetricians and Gynecologists (ACOG) recommends that you time how long it takes you to feel 10 kicks, flutters, swishes, or rolls. Ideally, you want to feel at least 10 movements within 2 hours. You will likely feel 10 movements in less time than that. Please start counting your babys movements twice a day using the following guidelines:  Fetal Kick Counts:  1. Count in the morning to make sure baby moves 5 times within one hour  2. Count in the evening to make sure baby moves 5 times within on hour  NOTE: count movements of any kind: kicks, clutters, swishes, rolls  If at any time you feel the baby is not moving as much as he/she usually does please follow the below:  1. Have something to eat/drink  2. Lie down on your left side in a quiet room with you hand on uterus  3. Count the movements your baby makes in the next hour  4. If you are not able to feel 5 movements in the hour notify provider   As baby grows and you get closer to your due date, the movements will change. With less room inside of your uterus your baby becomes less vigorous. Continue to monitor movements as outlined above.  Do Not compare your babys movements to other pregnant women or previous pregnancies. Each baby has an individual pattern of activity and development. R  Remember you know your sheyla better than anyone else. If there is sudden changes in the movement of your baby, notify provider immediately.

## 2017-08-07 ENCOUNTER — ANESTHESIA EVENT (OUTPATIENT)
Dept: OBSTETRICS AND GYNECOLOGY | Facility: OTHER | Age: 32
End: 2017-08-07
Payer: COMMERCIAL

## 2017-08-07 ENCOUNTER — TELEPHONE (OUTPATIENT)
Dept: OBSTETRICS AND GYNECOLOGY | Facility: CLINIC | Age: 32
End: 2017-08-07

## 2017-08-07 ENCOUNTER — HOSPITAL ENCOUNTER (INPATIENT)
Facility: OTHER | Age: 32
LOS: 4 days | Discharge: HOME | End: 2017-08-11
Attending: OBSTETRICS & GYNECOLOGY | Admitting: OBSTETRICS & GYNECOLOGY
Payer: COMMERCIAL

## 2017-08-07 ENCOUNTER — ANESTHESIA (OUTPATIENT)
Dept: OBSTETRICS AND GYNECOLOGY | Facility: OTHER | Age: 32
End: 2017-08-07
Payer: COMMERCIAL

## 2017-08-07 DIAGNOSIS — O14.10 SEVERE PREECLAMPSIA: ICD-10-CM

## 2017-08-07 DIAGNOSIS — O14.13 SEVERE PREECLAMPSIA, THIRD TRIMESTER: Primary | ICD-10-CM

## 2017-08-07 DIAGNOSIS — Z3A.38 38 WEEKS GESTATION OF PREGNANCY: ICD-10-CM

## 2017-08-07 PROBLEM — Z3A.27 27 WEEKS GESTATION OF PREGNANCY: Status: RESOLVED | Noted: 2017-05-22 | Resolved: 2017-08-07
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
## 2017-08-07 LAB
ABO + RH BLD: NORMAL
ALBUMIN SERPL BCP-MCNC: 2.7 G/DL
ALBUMIN SERPL BCP-MCNC: 2.8 G/DL
ALP SERPL-CCNC: 201 U/L
ALP SERPL-CCNC: 218 U/L
ALT SERPL W/O P-5'-P-CCNC: 12 U/L
ALT SERPL W/O P-5'-P-CCNC: 13 U/L
ANION GAP SERPL CALC-SCNC: 10 MMOL/L
ANION GAP SERPL CALC-SCNC: 11 MMOL/L
AST SERPL-CCNC: 17 U/L
AST SERPL-CCNC: 17 U/L
BASOPHILS # BLD AUTO: 0.02 K/UL
BASOPHILS # BLD AUTO: 0.02 K/UL
BASOPHILS NFR BLD: 0.1 %
BASOPHILS NFR BLD: 0.2 %
BILIRUB SERPL-MCNC: 0.3 MG/DL
BILIRUB SERPL-MCNC: 0.3 MG/DL
BLD GP AB SCN CELLS X3 SERPL QL: NORMAL
BUN SERPL-MCNC: 6 MG/DL
BUN SERPL-MCNC: 8 MG/DL
CALCIUM SERPL-MCNC: 7.7 MG/DL
CALCIUM SERPL-MCNC: 8.9 MG/DL
CHLORIDE SERPL-SCNC: 106 MMOL/L
CHLORIDE SERPL-SCNC: 108 MMOL/L
CO2 SERPL-SCNC: 19 MMOL/L
CO2 SERPL-SCNC: 19 MMOL/L
CREAT SERPL-MCNC: 0.6 MG/DL
CREAT SERPL-MCNC: 0.6 MG/DL
CREAT UR-MCNC: 19.9 MG/DL
DIFFERENTIAL METHOD: ABNORMAL
DIFFERENTIAL METHOD: ABNORMAL
EOSINOPHIL # BLD AUTO: 0 K/UL
EOSINOPHIL # BLD AUTO: 0.1 K/UL
EOSINOPHIL NFR BLD: 0.1 %
EOSINOPHIL NFR BLD: 0.5 %
ERYTHROCYTE [DISTWIDTH] IN BLOOD BY AUTOMATED COUNT: 13.8 %
ERYTHROCYTE [DISTWIDTH] IN BLOOD BY AUTOMATED COUNT: 13.8 %
EST. GFR  (AFRICAN AMERICAN): >60 ML/MIN/1.73 M^2
EST. GFR  (AFRICAN AMERICAN): >60 ML/MIN/1.73 M^2
EST. GFR  (NON AFRICAN AMERICAN): >60 ML/MIN/1.73 M^2
EST. GFR  (NON AFRICAN AMERICAN): >60 ML/MIN/1.73 M^2
GLUCOSE SERPL-MCNC: 78 MG/DL
GLUCOSE SERPL-MCNC: 87 MG/DL
HCT VFR BLD AUTO: 35.1 %
HCT VFR BLD AUTO: 37.3 %
HGB BLD-MCNC: 11.9 G/DL
HGB BLD-MCNC: 12.6 G/DL
LYMPHOCYTES # BLD AUTO: 1.7 K/UL
LYMPHOCYTES # BLD AUTO: 1.9 K/UL
LYMPHOCYTES NFR BLD: 10.5 %
LYMPHOCYTES NFR BLD: 13 %
MAGNESIUM SERPL-MCNC: 4.3 MG/DL
MCH RBC QN AUTO: 28.3 PG
MCH RBC QN AUTO: 28.5 PG
MCHC RBC AUTO-ENTMCNC: 33.8 G/DL
MCHC RBC AUTO-ENTMCNC: 33.9 G/DL
MCV RBC AUTO: 84 FL
MCV RBC AUTO: 84 FL
MONOCYTES # BLD AUTO: 1.1 K/UL
MONOCYTES # BLD AUTO: 1.3 K/UL
MONOCYTES NFR BLD: 6.9 %
MONOCYTES NFR BLD: 8 %
NEUTROPHILS # BLD AUTO: 10.3 K/UL
NEUTROPHILS # BLD AUTO: 14.8 K/UL
NEUTROPHILS NFR BLD: 77.9 %
NEUTROPHILS NFR BLD: 82 %
PLATELET # BLD AUTO: 203 K/UL
PLATELET # BLD AUTO: 226 K/UL
PMV BLD AUTO: 11.5 FL
PMV BLD AUTO: 11.8 FL
POTASSIUM SERPL-SCNC: 3.3 MMOL/L
POTASSIUM SERPL-SCNC: 3.7 MMOL/L
PROT SERPL-MCNC: 6.2 G/DL
PROT SERPL-MCNC: 6.4 G/DL
PROT UR-MCNC: <7 MG/DL
PROT/CREAT RATIO, UR: NORMAL
RBC # BLD AUTO: 4.18 M/UL
RBC # BLD AUTO: 4.45 M/UL
SODIUM SERPL-SCNC: 136 MMOL/L
SODIUM SERPL-SCNC: 137 MMOL/L
WBC # BLD AUTO: 13.25 K/UL
WBC # BLD AUTO: 18.03 K/UL

## 2017-08-07 PROCEDURE — 99284 EMERGENCY DEPT VISIT MOD MDM: CPT | Mod: 25,,, | Performed by: OBSTETRICS & GYNECOLOGY

## 2017-08-07 PROCEDURE — 59025 FETAL NON-STRESS TEST: CPT

## 2017-08-07 PROCEDURE — 27800517 HC TRAY,EPIDURAL-CONTINUOUS: Performed by: ANESTHESIOLOGY

## 2017-08-07 PROCEDURE — 99285 EMERGENCY DEPT VISIT HI MDM: CPT | Mod: 25

## 2017-08-07 PROCEDURE — 85025 COMPLETE CBC W/AUTO DIFF WBC: CPT

## 2017-08-07 PROCEDURE — 51702 INSERT TEMP BLADDER CATH: CPT

## 2017-08-07 PROCEDURE — 25000003 PHARM REV CODE 250: Performed by: OBSTETRICS & GYNECOLOGY

## 2017-08-07 PROCEDURE — 86850 RBC ANTIBODY SCREEN: CPT

## 2017-08-07 PROCEDURE — 86900 BLOOD TYPING SEROLOGIC ABO: CPT

## 2017-08-07 PROCEDURE — 82570 ASSAY OF URINE CREATININE: CPT

## 2017-08-07 PROCEDURE — 25000003 PHARM REV CODE 250: Performed by: ANESTHESIOLOGY

## 2017-08-07 PROCEDURE — 63600175 PHARM REV CODE 636 W HCPCS

## 2017-08-07 PROCEDURE — 11000001 HC ACUTE MED/SURG PRIVATE ROOM

## 2017-08-07 PROCEDURE — 63600175 PHARM REV CODE 636 W HCPCS: Performed by: OBSTETRICS & GYNECOLOGY

## 2017-08-07 PROCEDURE — 27200710 HC EPIDURAL INFUSION PUMP SET: Performed by: ANESTHESIOLOGY

## 2017-08-07 PROCEDURE — 36415 COLL VENOUS BLD VENIPUNCTURE: CPT

## 2017-08-07 PROCEDURE — 3E033VJ INTRODUCTION OF OTHER HORMONE INTO PERIPHERAL VEIN, PERCUTANEOUS APPROACH: ICD-10-PCS | Performed by: OBSTETRICS & GYNECOLOGY

## 2017-08-07 PROCEDURE — 80053 COMPREHEN METABOLIC PANEL: CPT

## 2017-08-07 PROCEDURE — 80053 COMPREHEN METABOLIC PANEL: CPT | Mod: 91

## 2017-08-07 PROCEDURE — 62326 NJX INTERLAMINAR LMBR/SAC: CPT | Performed by: ANESTHESIOLOGY

## 2017-08-07 PROCEDURE — 76815 OB US LIMITED FETUS(S): CPT | Mod: 26,,, | Performed by: OBSTETRICS & GYNECOLOGY

## 2017-08-07 PROCEDURE — 59510 CESAREAN DELIVERY: CPT | Mod: ,,, | Performed by: ANESTHESIOLOGY

## 2017-08-07 PROCEDURE — 59025 FETAL NON-STRESS TEST: CPT | Mod: 26,,, | Performed by: OBSTETRICS & GYNECOLOGY

## 2017-08-07 PROCEDURE — 83735 ASSAY OF MAGNESIUM: CPT

## 2017-08-07 PROCEDURE — 72100002 HC LABOR CARE, 1ST 8 HOURS

## 2017-08-07 RX ORDER — CALCIUM GLUCONATE 98 MG/ML
1 INJECTION, SOLUTION INTRAVENOUS
Status: DISCONTINUED | OUTPATIENT
Start: 2017-08-07 | End: 2017-08-11 | Stop reason: HOSPADM

## 2017-08-07 RX ORDER — SODIUM CITRATE AND CITRIC ACID MONOHYDRATE 334; 500 MG/5ML; MG/5ML
30 SOLUTION ORAL ONCE
Status: COMPLETED | OUTPATIENT
Start: 2017-08-07 | End: 2017-08-08

## 2017-08-07 RX ORDER — BUPIVACAINE HYDROCHLORIDE 2.5 MG/ML
INJECTION, SOLUTION INFILTRATION; PERINEURAL
Status: DISCONTINUED | OUTPATIENT
Start: 2017-08-07 | End: 2017-08-08

## 2017-08-07 RX ORDER — SUCRALFATE 1 G/1
1 TABLET ORAL 2 TIMES DAILY PRN
Status: DISCONTINUED | OUTPATIENT
Start: 2017-08-07 | End: 2017-08-11 | Stop reason: HOSPADM

## 2017-08-07 RX ORDER — MAGNESIUM SULFATE HEPTAHYDRATE 40 MG/ML
2 INJECTION, SOLUTION INTRAVENOUS ONCE
Status: COMPLETED | OUTPATIENT
Start: 2017-08-07 | End: 2017-08-07

## 2017-08-07 RX ORDER — ONDANSETRON 2 MG/ML
INJECTION INTRAMUSCULAR; INTRAVENOUS
Status: DISPENSED
Start: 2017-08-07 | End: 2017-08-08

## 2017-08-07 RX ORDER — ONDANSETRON 8 MG/1
8 TABLET, ORALLY DISINTEGRATING ORAL EVERY 8 HOURS PRN
Status: DISCONTINUED | OUTPATIENT
Start: 2017-08-07 | End: 2017-08-08 | Stop reason: HOSPADM

## 2017-08-07 RX ORDER — FAMOTIDINE 10 MG/ML
20 INJECTION INTRAVENOUS ONCE
Status: COMPLETED | OUTPATIENT
Start: 2017-08-07 | End: 2017-08-08

## 2017-08-07 RX ORDER — BUPIVACAINE HYDROCHLORIDE 2.5 MG/ML
INJECTION, SOLUTION EPIDURAL; INFILTRATION; INTRACAUDAL
Status: DISPENSED
Start: 2017-08-07 | End: 2017-08-08

## 2017-08-07 RX ORDER — FENTANYL/BUPIVACAINE/NS/PF 2MCG/ML-.1
PLASTIC BAG, INJECTION (ML) INJECTION CONTINUOUS
Status: DISCONTINUED | OUTPATIENT
Start: 2017-08-07 | End: 2017-08-11 | Stop reason: HOSPADM

## 2017-08-07 RX ORDER — ONDANSETRON 2 MG/ML
INJECTION INTRAMUSCULAR; INTRAVENOUS
Status: COMPLETED
Start: 2017-08-07 | End: 2017-08-07

## 2017-08-07 RX ORDER — ONDANSETRON 2 MG/ML
8 INJECTION INTRAMUSCULAR; INTRAVENOUS EVERY 6 HOURS PRN
Status: DISCONTINUED | OUTPATIENT
Start: 2017-08-07 | End: 2017-08-11 | Stop reason: HOSPADM

## 2017-08-07 RX ORDER — MAGNESIUM SULFATE HEPTAHYDRATE 40 MG/ML
2 INJECTION, SOLUTION INTRAVENOUS CONTINUOUS
Status: DISCONTINUED | OUTPATIENT
Start: 2017-08-07 | End: 2017-08-11 | Stop reason: HOSPADM

## 2017-08-07 RX ORDER — FENTANYL/BUPIVACAINE/NS/PF 2MCG/ML-.1
PLASTIC BAG, INJECTION (ML) INJECTION
Status: DISPENSED
Start: 2017-08-07 | End: 2017-08-08

## 2017-08-07 RX ORDER — SODIUM CHLORIDE, SODIUM LACTATE, POTASSIUM CHLORIDE, CALCIUM CHLORIDE 600; 310; 30; 20 MG/100ML; MG/100ML; MG/100ML; MG/100ML
INJECTION, SOLUTION INTRAVENOUS CONTINUOUS
Status: DISCONTINUED | OUTPATIENT
Start: 2017-08-07 | End: 2017-08-08

## 2017-08-07 RX ORDER — LIDOCAINE HYDROCHLORIDE AND EPINEPHRINE 15; 5 MG/ML; UG/ML
INJECTION, SOLUTION EPIDURAL
Status: DISCONTINUED | OUTPATIENT
Start: 2017-08-07 | End: 2017-08-08

## 2017-08-07 RX ORDER — FENTANYL CITRATE 50 UG/ML
INJECTION, SOLUTION INTRAMUSCULAR; INTRAVENOUS
Status: DISPENSED
Start: 2017-08-07 | End: 2017-08-08

## 2017-08-07 RX ORDER — FENTANYL CITRATE 50 UG/ML
INJECTION, SOLUTION INTRAMUSCULAR; INTRAVENOUS
Status: DISCONTINUED | OUTPATIENT
Start: 2017-08-07 | End: 2017-08-08

## 2017-08-07 RX ORDER — OXYTOCIN/RINGER'S LACTATE 20/1000 ML
2 PLASTIC BAG, INJECTION (ML) INTRAVENOUS CONTINUOUS
Status: DISCONTINUED | OUTPATIENT
Start: 2017-08-07 | End: 2017-08-08

## 2017-08-07 RX ORDER — FENTANYL/BUPIVACAINE/NS/PF 2MCG/ML-.1
PLASTIC BAG, INJECTION (ML) INJECTION CONTINUOUS PRN
Status: DISCONTINUED | OUTPATIENT
Start: 2017-08-07 | End: 2017-08-08

## 2017-08-07 RX ADMIN — ONDANSETRON 8 MG: 2 INJECTION INTRAMUSCULAR; INTRAVENOUS at 11:08

## 2017-08-07 RX ADMIN — Medication 5 ML: at 06:08

## 2017-08-07 RX ADMIN — LIDOCAINE HYDROCHLORIDE,EPINEPHRINE BITARTRATE 3 ML: 15; .005 INJECTION, SOLUTION EPIDURAL; INFILTRATION; INTRACAUDAL; PERINEURAL at 06:08

## 2017-08-07 RX ADMIN — MAGNESIUM SULFATE IN WATER 2 G: 40 INJECTION, SOLUTION INTRAVENOUS at 03:08

## 2017-08-07 RX ADMIN — SUCRALFATE 1 G: 1 TABLET ORAL at 11:08

## 2017-08-07 RX ADMIN — BUPIVACAINE HYDROCHLORIDE 5 ML: 2.5 INJECTION, SOLUTION INFILTRATION; PERINEURAL at 09:08

## 2017-08-07 RX ADMIN — SODIUM CHLORIDE, SODIUM LACTATE, POTASSIUM CHLORIDE, AND CALCIUM CHLORIDE: .6; .31; .03; .02 INJECTION, SOLUTION INTRAVENOUS at 03:08

## 2017-08-07 RX ADMIN — Medication 2 MILLI-UNITS/MIN: at 05:08

## 2017-08-07 RX ADMIN — Medication 10 ML/HR: at 06:08

## 2017-08-07 RX ADMIN — FENTANYL CITRATE 100 MCG: 50 INJECTION, SOLUTION INTRAMUSCULAR; INTRAVENOUS at 06:08

## 2017-08-07 RX ADMIN — MAGNESIUM SULFATE HEPTAHYDRATE 2 G/HR: 40 INJECTION, SOLUTION INTRAVENOUS at 03:08

## 2017-08-07 NOTE — ED PROVIDER NOTES
Encounter Date: 2017       History     Chief Complaint   Patient presents with    Headache     32 y.o.  @ 38w2d with severe headache unresolved with Tylenol x2 and elevated Bps. 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 Endorses good fetal movement.          Review of patient's allergies indicates:  No Known Allergies  Past Medical History:   Diagnosis Date    Fibroadenoma of right breast     Menarche     Age of onset 13    Migraine     Severe preeclampsia 2017     Past Surgical History:   Procedure Laterality Date    BREAST BIOPSY      fibroadenoma    WISDOM TOOTH EXTRACTION       Family History   Problem Relation Age of Onset    Diabetes Father      Pre    Hypertension Father     Skin cancer Father     Cancer Father     Thyroid disease Mother     Hypertension Sister     Breast cancer Maternal Grandmother     Cancer Maternal Grandmother     Breast cancer Maternal Aunt     Colon cancer Neg Hx     Ovarian cancer Neg Hx     Melanoma Neg Hx      Social History   Substance Use Topics    Smoking status: Never Smoker    Smokeless tobacco: Never Used    Alcohol use No      Comment: social-- not currently     Review of Systems   Constitutional: Negative for chills and fever.   HENT: Negative.    Eyes: Negative for photophobia and visual disturbance.   Respiratory: Negative for shortness of breath.    Cardiovascular: Negative for chest pain.   Gastrointestinal: Negative for abdominal pain.   Endocrine: Negative.    Genitourinary: Negative for difficulty urinating, pelvic pain, vaginal bleeding and vaginal discharge.        Gravid uterus   Skin: Negative.    Neurological: Negative for dizziness, seizures, light-headedness and headaches.        Severe headache   Hematological: Does not bruise/bleed easily.   Psychiatric/Behavioral: Negative for suicidal ideas.       Physical Exam     Initial Vitals [17 1420]   BP Pulse Resp Temp SpO2   (!) 155/93 71 20 98 °F (36.7 °C) 100 %       MAP       113.67         Physical Exam    Constitutional: She appears well-developed and well-nourished. She is not diaphoretic. No distress.   HENT:   Head: Normocephalic and atraumatic.   Neck: Normal range of motion.   Cardiovascular: Normal rate and regular rhythm.   Pulmonary/Chest: Breath sounds normal.   Abdominal: Soft. She exhibits no distension. There is no guarding.   Genitourinary: No vaginal discharge found.   Genitourinary Comments: Gravid uterus consistent with 38 weeks   Musculoskeletal: Normal range of motion.   Neurological: She is alert and oriented to person, place, and time. She has normal reflexes.   Skin: Skin is warm.   Psychiatric: She has a normal mood and affect. Her behavior is normal. Judgment and thought content normal.         ED Course   Obtain Fetal nonstress test (NST)  Date/Time: 8/7/2017 3:23 PM  Performed by: SHELLEY CRAIG  Authorized by: SHELLEY CRAIG     A time out verifies correct patient, procedure, equipment, support staff and site/side marked as required:   Nonstress Test:     Variability:  6-25 BPM    Decelerations:  None    Accelerations:  Absent    Baseline:  145    Uterine Irritability: No      Contractions:  Not present  Biophysical Profile:     Nonstress Test Interpretation: nonreactive      Overall Impression:  Reassuring  Post-procedure:      Admit to L&D for induction of labor for preeclampsia with severe features      Labs Reviewed - No data to display          Medical Decision Making:   Clinical Tests:   Lab Tests: Ordered  ED Management:  PreEclampsia with severe features by elevated blood pressures and headache that won't resolve  -Check CBC, CMP, UPC  -Magnesium 4g load with 2g maintenance for neurological symptoms, seizure prophylaxis  -Will treat for Bps >160/>110 with IV antihypertensives  -Consented for delivery/blood  -SVE 1/25%/high, IOL with cytotec versus sahni balloon  -NST non reactive but Cat I                   ED Course   Comment  By Time   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 SVE 1/25%/high Rita Centeno MD 08/07 1444     Clinical Impression:   The primary encounter diagnosis was Severe preeclampsia, third trimester. A diagnosis of 38 weeks gestation of pregnancy was also pertinent to this visit.                           Rita Centeno MD  08/07/17 [id number]

## 2017-08-07 NOTE — ANESTHESIA PROCEDURE NOTES
Epidural    Patient location during procedure: OB   Reason for block: primary anesthetic   Diagnosis: Active Labor   Start time: 8/7/2017 6:04 PM  Timeout: 8/7/2017 6:02 PM  End time: 8/7/2017 6:08 PM  Surgery related to: Vaginal Delivery  Staffing  Anesthesiologist: EMILY GUZMÁN  Resident/CRNA: ALEXANDRE KOWALSKI  Performed: resident/CRNA   Preanesthetic Checklist  Completed: patient identified, site marked, surgical consent, pre-op evaluation, timeout performed, IV checked, risks and benefits discussed, monitors and equipment checked, anesthesia consent given, hand hygiene performed and patient being monitored  Preparation  Patient position: sitting  Prep: ChloraPrep  Patient monitoring: Pulse Ox and Blood Pressure  Epidural  Skin Anesthetic: lidocaine 1%  Skin Wheal: 3 mL  Administration type: continuous  Approach: midline  Interspace: L4-5  Injection technique: ABDELRAHMAN saline  Needle and Epidural Catheter  Needle type: Tuohy   Needle gauge: 17  Needle length: 3.5 inches  Needle insertion depth: 5 cm  Catheter type: springwound and multi-orifice  Catheter size: 19 G  Catheter at skin depth: 10 cm  Test dose: 3 mL of lidocaine 1.5% with Epi 1-to-200,000  Additional Documentation: incremental injection, negative aspiration for heme and CSF, no paresthesia on injection, no signs/symptoms of IV or SA injection, no significant pain on injection and no significant complaints from patient  Needle localization: anatomical landmarks  Medications:  Bolus administered: 10 mL of 0.125% bupivacaine  Opioid administered: 100 mcg of   fentanyl  Volume per aspiration: 5 mL  Time between aspirations: 5 minutes  Assessment   Dermatomal levels determined by ice  Ease of block: easy  Patient's tolerance of the procedure: comfortable throughout block and no complaints

## 2017-08-07 NOTE — PROGRESS NOTES
Labor Progress Note        Subjective:      Patient currently asking for epidural.     Objective:      Temp:  [98 °F (36.7 °C)] 98 °F (36.7 °C)  Pulse:  [70-92] 92  Resp:  [20] 20  SpO2:  [97 %-100 %] 97 %  BP: (131-155)/(76-93) 131/76  There is no height or weight on file to calculate BMI.     General: no acute distress  Electronic Fetal Monitoring:  FHT: 140 bpm, moderate variability, accelerations absent, decelerations absent   Category: 1                 TOCO: Contractions: none        Assessment:     1. IUP at  here for induction of labor     Plan:     1. Continue active management of labor. Cook catheter placed @ 1600. Will start pitocin once patient has epidural.  2. Reassuring FHT  3. Epidural yes - patient asking for epidural after cook catheter placed  4. Membranes ruptured no.   5. Cervix:1/25%/high  6. Recheck in 4 hours or prn.

## 2017-08-07 NOTE — ASSESSMENT & PLAN NOTE
Admit to L&D for IOL, severe range Bps, headache  Given non reactive NST, will induce with cook catheter and pitocin  Magnesium for neurological symptoms  IV antihypertensives PRN for severe range BPs

## 2017-08-07 NOTE — ED NOTES
Pt c/o headache not relieved by tylenol.pt placed in assessment 3 placed on monitor and v/s per flowsheet.

## 2017-08-07 NOTE — SUBJECTIVE & OBJECTIVE
Obstetric HPI:  Patient reports None contractions, active fetal movement, No vaginal bleeding , No loss of fluid     This pregnancy has been complicated by preeclampsia with severe features, headache    Obstetric History       T0      L0     SAB0   TAB0   Ectopic0   Multiple0   Live Births0       # Outcome Date GA Lbr Naldo/2nd Weight Sex Delivery Anes PTL Lv   1 Current                 Past Medical History:   Diagnosis Date    Fibroadenoma of right breast     Menarche     Age of onset 13    Migraine     Severe preeclampsia 2017     Past Surgical History:   Procedure Laterality Date    BREAST BIOPSY      fibroadenoma    WISDOM TOOTH EXTRACTION         PTA Medications   Medication Sig    calcium carbonate (TUMS) 200 mg calcium (500 mg) chewable tablet Take 1 tablet by mouth once daily.    OMEPRAZOLE (PRILOSEC ORAL) Take by mouth.    PRENATAL VIT CALC,IRON,FOLIC (PRENATAL VITAMIN ORAL) Take by mouth.       Review of patient's allergies indicates:  No Known Allergies     Family History     Problem Relation (Age of Onset)    Breast cancer Maternal Grandmother, Maternal Aunt    Cancer Father, Maternal Grandmother    Diabetes Father    Hypertension Father, Sister    Skin cancer Father    Thyroid disease Mother        Social History Main Topics    Smoking status: Never Smoker    Smokeless tobacco: Never Used    Alcohol use No      Comment: social-- not currently    Drug use: No    Sexual activity: Yes     Partners: Male     Birth control/ protection: Condom     Review of Systems   Constitutional: Negative for chills and fever.   Eyes: Negative for visual disturbance.   Respiratory: Negative for cough and shortness of breath.    Cardiovascular: Negative for chest pain and leg swelling.   Gastrointestinal: Negative for abdominal pain, nausea and vomiting.   Genitourinary: Negative for dysuria, flank pain, frequency, urgency and vaginal bleeding.   Neurological: Positive for headaches. Negative  for syncope.   Psychiatric/Behavioral: Negative for depression. The patient is not nervous/anxious.    Breast: Negative for breast mass and breast pain  All other systems reviewed and are negative.     Objective:     Vital Signs (Most Recent):  Temp: 98 °F (36.7 °C) (08/07/17 1420)  Pulse: 72 (08/07/17 1440)  Resp: 20 (08/07/17 1420)  BP: (!) 146/89 (08/07/17 1440)  SpO2: 97 % (08/07/17 1440) Vital Signs (24h Range):  Temp:  [98 °F (36.7 °C)] 98 °F (36.7 °C)  Pulse:  [70-72] 72  Resp:  [20] 20  SpO2:  [97 %-100 %] 97 %  BP: (146-155)/(89-93) 146/89        There is no height or weight on file to calculate BMI.    FHT: Cat 1 (reassuring)  TOCO:  Q 0 minutes    Physical Exam:   Constitutional: She is oriented to person, place, and time. She appears well-developed and well-nourished. No distress.    HENT:   Head: Normocephalic and atraumatic.      Cardiovascular: Normal rate and regular rhythm.     Pulmonary/Chest: Effort normal. No respiratory distress.        Abdominal: Soft. She exhibits no distension. There is no rebound and no guarding.     Genitourinary: No vaginal discharge found.   Genitourinary Comments: Gravid uterus           Musculoskeletal: Normal range of motion and moves all extremeties.       Neurological: She is alert and oriented to person, place, and time. She has normal reflexes.    Skin: Skin is warm.    Psychiatric: She has a normal mood and affect. Judgment and thought content normal.       Cervix:  Dilation:  1  Effacement:  25%  Station: -3  Presentation: Vertex     Significant Labs:  Lab Results   Component Value Date    GROUPTRH O POS 05/21/2017    HEPBSAG Negative 01/03/2017    STREPBCULT No Group B Streptococcus isolated 07/18/2017       I have personallly reviewed all pertinent lab results from the last 24 hours.   CBC, CMP, urine/protin creatinine ratio pending

## 2017-08-07 NOTE — PROGRESS NOTES
Labor Progress Note        Subjective:      Patient currently complaining of contraction pain.     Objective:      Temp:  [98 °F (36.7 °C)] 98 °F (36.7 °C)  Pulse:  [] 103  Resp:  [16-20] 16  SpO2:  [97 %-100 %] 98 %  BP: (131-167)/(69-93) 144/79  Body mass index is 25.94 kg/m².     General: no acute distress  Electronic Fetal Monitoring:  FHT: 145 bpm, moderate variability, accelerations absent, decelerations present - 2 late decels, 1 variable decel  Category: 2, overall reassuring                 TOCO: Contractions: irregular, every 10 minutes        Assessment:     1. IUP at  here for induction of labor     Plan:     1. Continue active management of labor. Pitocin at 4 mU.  2. Reassuring FHT  3. Epidural yes.   4. Membranes ruptured no.   5. Cervix: cook catheter in place  6. Recheck in 4 hours or prn.

## 2017-08-07 NOTE — ANESTHESIA PREPROCEDURE EVALUATION
2017    Pre-operative evaluation for IUP    Olgakaylyn Prater is a 32 y.o.  at 38w2d admitted for IOL secondary to elevated blood pressure. Patient reports HA while at work which was not controlled with tylenol. Denies any cardiac, pulmonary, bleeding or spinal diseases.     Past Surgical History:   Procedure Laterality Date    BREAST BIOPSY      fibroadenoma    WISDOM TOOTH EXTRACTION       Vital Signs Range (Last 24H):  Temp:  [36.7 °C (98 °F)]   Pulse:  [70-86]   Resp:  [20]   BP: (132-155)/(81-93)   SpO2:  [97 %-100 %]       CBC:     Recent Labs  Lab 17  0955 17  1438   WBC 10.71 13.25*   RBC 4.36 4.18   HGB 12.4 11.9*   HCT 37.2 35.1*    203   MCV 85 84   MCH 28.4 28.5   MCHC 33.3 33.9       CMP: No results for input(s): NA, K, CL, CO2, BUN, CREATININE, GLU, MG, PHOS, CALCIUM, ALBUMIN, PROT, ALKPHOS, ALT, AST, BILITOT in the last 720 hours.    INR:  No results for input(s): INR, PROTIME, APTT in the last 720 hours.    Invalid input(s): PT    Anesthesia Evaluation    I have reviewed the Patient Summary Reports.     I have reviewed the Medications.     Review of Systems  Anesthesia Hx:  No problems with previous Anesthesia Denies Hx of Anesthetic complications  Neg history of prior surgery. Denies Family Hx of Anesthesia complications.   Denies Personal Hx of Anesthesia complications.   Cardiovascular:   Hypertension Denies MI.  Denies CAD.     Denies Angina.        Pulmonary:   Denies Pneumonia Denies COPD.  Denies Asthma.  Denies Shortness of breath.    Renal/:   Denies Chronic Renal Disease.     Hepatic/GI:   Denies GERD.    OB/GYN/PEDS:  Pregnancy induced hypertension.   Neurological:   Denies TIA. Denies CVA. Headaches    Endocrine:   Denies Diabetes.        Physical Exam  General:  Well nourished    Airway/Jaw/Neck:  Airway Findings: Mouth Opening: Normal Tongue:  Normal  General Airway Assessment: Adult  Mallampati: III  Improves to II with phonation.  TM Distance: Normal, at least 6 cm  Jaw/Neck Findings:  Micrognathia: Negative Mandibular Fracture: Negative    Neck ROM: Normal ROM     Eyes/Ears/Nose:  EYES/EARS/NOSE FINDINGS: Normal   Dental:  Dental Findings: In tact   Chest/Lungs:  Chest/Lungs Findings: Clear to auscultation, Normal Respiratory Rate         Mental Status:  Mental Status Findings: Normal        Anesthesia Plan  Type of Anesthesia, risks & benefits discussed:  Anesthesia Type:  general, epidural, CSE, spinal  Patient's Preference:   Intra-op Monitoring Plan:   Intra-op Monitoring Plan Comments:   Post Op Pain Control Plan:   Post Op Pain Control Plan Comments:   Induction:    Beta Blocker:  Patient is not currently on a Beta-Blocker (No further documentation required).       Informed Consent: Patient understands risks and agrees with Anesthesia plan.  Questions answered. Anesthesia consent signed with patient.  ASA Score: 2     Day of Surgery Review of History & Physical:    H&P update referred to the surgeon.         Ready For Surgery From Anesthesia Perspective.

## 2017-08-07 NOTE — HOSPITAL COURSE
Admit to L&D for IOL for preeclampsia for severe features (+HA).  Pt underwent primary  section for nonreassuring fetal heart tones on  while on magnesium sulfate for her preeclampsia.  POD 1: doing well, no headaches, plans to have Dr. Aguila in the NICU do her baby's circumcision.  POD 2: Patient is frustrated re difficulty with breastfeeding, looking forward to working with lactation today.  POD 3: no complaints, desires discharge today. Nife 30XL was started for mild range Bps the evening prior to discharge - discussed with Dr Pastrana

## 2017-08-07 NOTE — TELEPHONE ENCOUNTER
Ob 38wks, has been having a headache all day took bp and it was 166/94.I asked Britt she went to ask Katie and was told to go to Labor and Delivery.

## 2017-08-07 NOTE — H&P
[facility]  Obstetrics  History & Physical    Patient Name: Olga Prater  MRN: [id number]  Admission Date: 2017  Primary Care Provider: Primary Doctor No    Subjective:     Principal Problem: Preeclampsia with severe features    History of Present Illness:  32 y.o.  @ 38w2d with PreE with severe features    Obstetric HPI:  Patient reports None contractions, active fetal movement, No vaginal bleeding , No loss of fluid     This pregnancy has been complicated by preeclampsia with severe features, headache    Obstetric History       T0      L0     SAB0   TAB0   Ectopic0   Multiple0   Live Births0       # Outcome Date GA Lbr Naldo/2nd Weight Sex Delivery Anes PTL Lv   1 Current                 Past Medical History:   Diagnosis Date    Fibroadenoma of right breast     Menarche     Age of onset 13    Migraine     Severe preeclampsia 2017     Past Surgical History:   Procedure Laterality Date    BREAST BIOPSY      fibroadenoma    WISDOM TOOTH EXTRACTION         PTA Medications   Medication Sig    calcium carbonate (TUMS) 200 mg calcium (500 mg) chewable tablet Take 1 tablet by mouth once daily.    OMEPRAZOLE (PRILOSEC ORAL) Take by mouth.    PRENATAL VIT CALC,IRON,FOLIC (PRENATAL VITAMIN ORAL) Take by mouth.       Review of patient's allergies indicates:  No Known Allergies     Family History     Problem Relation (Age of Onset)    Breast cancer Maternal Grandmother, Maternal Aunt    Cancer Father, Maternal Grandmother    Diabetes Father    Hypertension Father, Sister    Skin cancer Father    Thyroid disease Mother        Social History Main Topics    Smoking status: Never Smoker    Smokeless tobacco: Never Used    Alcohol use No      Comment: social-- not currently    Drug use: No    Sexual activity: Yes     Partners: Male     Birth control/ protection: Condom     Review of Systems   Constitutional: Negative for chills and fever.   Eyes: Negative for visual  disturbance.   Respiratory: Negative for cough and shortness of breath.    Cardiovascular: Negative for chest pain and leg swelling.   Gastrointestinal: Negative for abdominal pain, nausea and vomiting.   Genitourinary: Negative for dysuria, flank pain, frequency, urgency and vaginal bleeding.   Neurological: Positive for headaches. Negative for syncope.   Psychiatric/Behavioral: Negative for depression. The patient is not nervous/anxious.    Breast: Negative for breast mass and breast pain  All other systems reviewed and are negative.     Objective:     Vital Signs (Most Recent):  Temp: 98 °F (36.7 °C) (08/07/17 1420)  Pulse: 72 (08/07/17 1440)  Resp: 20 (08/07/17 1420)  BP: (!) 146/89 (08/07/17 1440)  SpO2: 97 % (08/07/17 1440) Vital Signs (24h Range):  Temp:  [98 °F (36.7 °C)] 98 °F (36.7 °C)  Pulse:  [70-72] 72  Resp:  [20] 20  SpO2:  [97 %-100 %] 97 %  BP: (146-155)/(89-93) 146/89        There is no height or weight on file to calculate BMI.    FHT: Cat 1 (reassuring)  TOCO:  Q 0 minutes    Physical Exam:   Constitutional: She is oriented to person, place, and time. She appears well-developed and well-nourished. No distress.    HENT:   Head: Normocephalic and atraumatic.      Cardiovascular: Normal rate and regular rhythm.     Pulmonary/Chest: Effort normal. No respiratory distress.        Abdominal: Soft. She exhibits no distension. There is no rebound and no guarding.     Genitourinary: No vaginal discharge found.   Genitourinary Comments: Gravid uterus           Musculoskeletal: Normal range of motion and moves all extremeties.       Neurological: She is alert and oriented to person, place, and time. She has normal reflexes.    Skin: Skin is warm.    Psychiatric: She has a normal mood and affect. Judgment and thought content normal.       Cervix:  Dilation:  1  Effacement:  25%  Station: -3  Presentation: Vertex     Significant Labs:  Lab Results   Component Value Date    [email or web address] O POS 05/21/2017    HEPBSAG  Negative 2017    STREPBCULT No Group B Streptococcus isolated 2017       I have personallly reviewed all pertinent lab results from the last 24 hours.   CBC, CMP, urine/protin creatinine ratio pending    Assessment/Plan:     32 y.o. female  at 38w2d for:  PreEclampsia with severe features by elevated blood pressures and headache that won't resolve  -Check CBC, CMP, UPC  -Magnesium 4g load with 2g maintenance for neurological symptoms, seizure prophylaxis  -Will treat for Bps >160/>110 with IV antihypertensives  -Consented for delivery/blood  -SVE 1/25%/high, IOL with sahni balloon  -NST non reactive but Cat I      Rita Cneteno MD  Obstetrics  [facility]

## 2017-08-07 NOTE — TELEPHONE ENCOUNTER
Confirmed with Dr. Higgins, she agrees pt should go to OB ED. Spoke with pt and she was checking in at that time.

## 2017-08-08 ENCOUNTER — SURGERY (OUTPATIENT)
Age: 32
End: 2017-08-08

## 2017-08-08 LAB
ALLENS TEST: ABNORMAL
HCO3 UR-SCNC: 21.4 MMOL/L (ref 24–28)
PCO2 BLDA: 43.5 MMHG (ref 35–45)
PH SMN: 7.3 [PH] (ref 7.35–7.45)
PO2 BLDA: 26 MMHG (ref 80–100)
POC BE: -5 MMOL/L
POC SATURATED O2: 41 % (ref 95–100)
SAMPLE: ABNORMAL
SITE: ABNORMAL

## 2017-08-08 PROCEDURE — 63600175 PHARM REV CODE 636 W HCPCS: Performed by: ANESTHESIOLOGY

## 2017-08-08 PROCEDURE — 88307 TISSUE EXAM BY PATHOLOGIST: CPT | Performed by: PATHOLOGY

## 2017-08-08 PROCEDURE — 25000003 PHARM REV CODE 250: Performed by: ANESTHESIOLOGY

## 2017-08-08 PROCEDURE — 82803 BLOOD GASES ANY COMBINATION: CPT

## 2017-08-08 PROCEDURE — 25000003 PHARM REV CODE 250: Performed by: OBSTETRICS & GYNECOLOGY

## 2017-08-08 PROCEDURE — 37000009 HC ANESTHESIA EA ADD 15 MINS: Performed by: OBSTETRICS & GYNECOLOGY

## 2017-08-08 PROCEDURE — 88307 TISSUE EXAM BY PATHOLOGIST: CPT | Mod: 26,,, | Performed by: PATHOLOGY

## 2017-08-08 PROCEDURE — 59510 CESAREAN DELIVERY: CPT | Mod: AT,,, | Performed by: OBSTETRICS & GYNECOLOGY

## 2017-08-08 PROCEDURE — 11000001 HC ACUTE MED/SURG PRIVATE ROOM

## 2017-08-08 PROCEDURE — 37000008 HC ANESTHESIA 1ST 15 MINUTES: Performed by: OBSTETRICS & GYNECOLOGY

## 2017-08-08 PROCEDURE — 36000685 HC CESAREAN SECTION LEVEL I

## 2017-08-08 PROCEDURE — 27200918 HC ALEXIS O RING

## 2017-08-08 PROCEDURE — 63600175 PHARM REV CODE 636 W HCPCS: Performed by: OBSTETRICS & GYNECOLOGY

## 2017-08-08 PROCEDURE — S0028 INJECTION, FAMOTIDINE, 20 MG: HCPCS | Performed by: ANESTHESIOLOGY

## 2017-08-08 PROCEDURE — 0502F SUBSEQUENT PRENATAL CARE: CPT | Mod: ,,, | Performed by: OBSTETRICS & GYNECOLOGY

## 2017-08-08 PROCEDURE — 99900035 HC TECH TIME PER 15 MIN (STAT)

## 2017-08-08 RX ORDER — KETOROLAC TROMETHAMINE 30 MG/ML
INJECTION, SOLUTION INTRAMUSCULAR; INTRAVENOUS
Status: DISCONTINUED | OUTPATIENT
Start: 2017-08-08 | End: 2017-08-08

## 2017-08-08 RX ORDER — MISOPROSTOL 200 UG/1
800 TABLET ORAL
Status: DISCONTINUED | OUTPATIENT
Start: 2017-08-08 | End: 2017-08-11 | Stop reason: HOSPADM

## 2017-08-08 RX ORDER — METHYLERGONOVINE MALEATE 0.2 MG/ML
INJECTION INTRAVENOUS
Status: DISCONTINUED
Start: 2017-08-08 | End: 2017-08-08 | Stop reason: WASHOUT

## 2017-08-08 RX ORDER — OXYTOCIN/RINGER'S LACTATE 20/1000 ML
41.65 PLASTIC BAG, INJECTION (ML) INTRAVENOUS CONTINUOUS
Status: ACTIVE | OUTPATIENT
Start: 2017-08-08 | End: 2017-08-08

## 2017-08-08 RX ORDER — ACETAMINOPHEN 325 MG/1
650 TABLET ORAL EVERY 6 HOURS
Status: COMPLETED | OUTPATIENT
Start: 2017-08-08 | End: 2017-08-09

## 2017-08-08 RX ORDER — HYDROMORPHONE HYDROCHLORIDE 2 MG/ML
INJECTION, SOLUTION INTRAMUSCULAR; INTRAVENOUS; SUBCUTANEOUS
Status: DISCONTINUED | OUTPATIENT
Start: 2017-08-08 | End: 2017-08-08

## 2017-08-08 RX ORDER — ONDANSETRON 2 MG/ML
4 INJECTION INTRAMUSCULAR; INTRAVENOUS EVERY 12 HOURS PRN
Status: DISCONTINUED | OUTPATIENT
Start: 2017-08-08 | End: 2017-08-11 | Stop reason: HOSPADM

## 2017-08-08 RX ORDER — SODIUM CITRATE AND CITRIC ACID MONOHYDRATE 334; 500 MG/5ML; MG/5ML
30 SOLUTION ORAL
Status: DISCONTINUED | OUTPATIENT
Start: 2017-08-08 | End: 2017-08-08

## 2017-08-08 RX ORDER — SODIUM CHLORIDE, SODIUM LACTATE, POTASSIUM CHLORIDE, CALCIUM CHLORIDE 600; 310; 30; 20 MG/100ML; MG/100ML; MG/100ML; MG/100ML
INJECTION, SOLUTION INTRAVENOUS CONTINUOUS
Status: ACTIVE | OUTPATIENT
Start: 2017-08-08 | End: 2017-08-08

## 2017-08-08 RX ORDER — BUTALBITAL, ACETAMINOPHEN AND CAFFEINE 50; 325; 40 MG/1; MG/1; MG/1
2 TABLET ORAL EVERY 6 HOURS PRN
Status: DISCONTINUED | OUTPATIENT
Start: 2017-08-08 | End: 2017-08-11 | Stop reason: HOSPADM

## 2017-08-08 RX ORDER — FENTANYL CITRATE 50 UG/ML
INJECTION, SOLUTION INTRAMUSCULAR; INTRAVENOUS
Status: DISPENSED
Start: 2017-08-08 | End: 2017-08-08

## 2017-08-08 RX ORDER — IBUPROFEN 400 MG/1
800 TABLET ORAL EVERY 8 HOURS
Status: DISCONTINUED | OUTPATIENT
Start: 2017-08-09 | End: 2017-08-08

## 2017-08-08 RX ORDER — SIMETHICONE 80 MG
1 TABLET,CHEWABLE ORAL EVERY 6 HOURS PRN
Status: DISCONTINUED | OUTPATIENT
Start: 2017-08-08 | End: 2017-08-11 | Stop reason: HOSPADM

## 2017-08-08 RX ORDER — KETOROLAC TROMETHAMINE 30 MG/ML
30 INJECTION, SOLUTION INTRAMUSCULAR; INTRAVENOUS EVERY 6 HOURS
Status: DISCONTINUED | OUTPATIENT
Start: 2017-08-08 | End: 2017-08-08

## 2017-08-08 RX ORDER — ACETAMINOPHEN 10 MG/ML
INJECTION, SOLUTION INTRAVENOUS
Status: DISCONTINUED | OUTPATIENT
Start: 2017-08-08 | End: 2017-08-08

## 2017-08-08 RX ORDER — LIDOCAINE HCL/EPINEPHRINE/PF 2%-1:200K
VIAL (ML) INJECTION
Status: DISCONTINUED | OUTPATIENT
Start: 2017-08-08 | End: 2017-08-08

## 2017-08-08 RX ORDER — BUPIVACAINE HYDROCHLORIDE 2.5 MG/ML
INJECTION, SOLUTION EPIDURAL; INFILTRATION; INTRACAUDAL
Status: DISPENSED
Start: 2017-08-08 | End: 2017-08-08

## 2017-08-08 RX ORDER — METHYLERGONOVINE MALEATE 0.2 MG/ML
200 INJECTION INTRAVENOUS
Status: DISCONTINUED | OUTPATIENT
Start: 2017-08-08 | End: 2017-08-11 | Stop reason: HOSPADM

## 2017-08-08 RX ORDER — SODIUM CHLORIDE, SODIUM LACTATE, POTASSIUM CHLORIDE, CALCIUM CHLORIDE 600; 310; 30; 20 MG/100ML; MG/100ML; MG/100ML; MG/100ML
INJECTION, SOLUTION INTRAVENOUS CONTINUOUS PRN
Status: DISCONTINUED | OUTPATIENT
Start: 2017-08-08 | End: 2017-08-08

## 2017-08-08 RX ORDER — BISACODYL 10 MG
10 SUPPOSITORY, RECTAL RECTAL ONCE AS NEEDED
Status: ACTIVE | OUTPATIENT
Start: 2017-08-08 | End: 2017-08-08

## 2017-08-08 RX ORDER — MIDAZOLAM HYDROCHLORIDE 1 MG/ML
INJECTION INTRAMUSCULAR; INTRAVENOUS
Status: DISCONTINUED | OUTPATIENT
Start: 2017-08-08 | End: 2017-08-08

## 2017-08-08 RX ORDER — OXYTOCIN 10 [USP'U]/ML
INJECTION, SOLUTION INTRAMUSCULAR; INTRAVENOUS
Status: DISCONTINUED
Start: 2017-08-08 | End: 2017-08-08 | Stop reason: WASHOUT

## 2017-08-08 RX ORDER — MISOPROSTOL 200 UG/1
600 TABLET ORAL
Status: DISCONTINUED | OUTPATIENT
Start: 2017-08-08 | End: 2017-08-11 | Stop reason: HOSPADM

## 2017-08-08 RX ORDER — MISOPROSTOL 200 UG/1
TABLET ORAL
Status: DISCONTINUED
Start: 2017-08-08 | End: 2017-08-08 | Stop reason: WASHOUT

## 2017-08-08 RX ORDER — ONDANSETRON 8 MG/1
8 TABLET, ORALLY DISINTEGRATING ORAL EVERY 8 HOURS PRN
Status: DISCONTINUED | OUTPATIENT
Start: 2017-08-08 | End: 2017-08-11 | Stop reason: HOSPADM

## 2017-08-08 RX ORDER — CARBOPROST TROMETHAMINE 250 UG/ML
INJECTION, SOLUTION INTRAMUSCULAR
Status: DISCONTINUED
Start: 2017-08-08 | End: 2017-08-08 | Stop reason: WASHOUT

## 2017-08-08 RX ORDER — CEFAZOLIN SODIUM 2 G/50ML
2 SOLUTION INTRAVENOUS
Status: COMPLETED | OUTPATIENT
Start: 2017-08-08 | End: 2017-08-08

## 2017-08-08 RX ORDER — OXYCODONE AND ACETAMINOPHEN 10; 325 MG/1; MG/1
1 TABLET ORAL EVERY 4 HOURS PRN
Status: DISCONTINUED | OUTPATIENT
Start: 2017-08-09 | End: 2017-08-11 | Stop reason: HOSPADM

## 2017-08-08 RX ORDER — KETOROLAC TROMETHAMINE 30 MG/ML
30 INJECTION, SOLUTION INTRAMUSCULAR; INTRAVENOUS EVERY 6 HOURS
Status: COMPLETED | OUTPATIENT
Start: 2017-08-08 | End: 2017-08-08

## 2017-08-08 RX ORDER — OXYCODONE HYDROCHLORIDE 5 MG/1
10 TABLET ORAL EVERY 4 HOURS PRN
Status: ACTIVE | OUTPATIENT
Start: 2017-08-08 | End: 2017-08-09

## 2017-08-08 RX ORDER — OXYCODONE AND ACETAMINOPHEN 5; 325 MG/1; MG/1
1 TABLET ORAL EVERY 4 HOURS PRN
Status: DISCONTINUED | OUTPATIENT
Start: 2017-08-09 | End: 2017-08-11 | Stop reason: HOSPADM

## 2017-08-08 RX ORDER — AMOXICILLIN 250 MG
1 CAPSULE ORAL NIGHTLY PRN
Status: DISCONTINUED | OUTPATIENT
Start: 2017-08-08 | End: 2017-08-11 | Stop reason: HOSPADM

## 2017-08-08 RX ORDER — FAMOTIDINE 10 MG/ML
20 INJECTION INTRAVENOUS
Status: DISCONTINUED | OUTPATIENT
Start: 2017-08-08 | End: 2017-08-08

## 2017-08-08 RX ORDER — CARBOPROST TROMETHAMINE 250 UG/ML
250 INJECTION, SOLUTION INTRAMUSCULAR
Status: DISCONTINUED | OUTPATIENT
Start: 2017-08-08 | End: 2017-08-11 | Stop reason: HOSPADM

## 2017-08-08 RX ORDER — OXYCODONE HYDROCHLORIDE 5 MG/1
5 TABLET ORAL EVERY 4 HOURS PRN
Status: DISPENSED | OUTPATIENT
Start: 2017-08-08 | End: 2017-08-09

## 2017-08-08 RX ORDER — DIPHENHYDRAMINE HCL 25 MG
25 CAPSULE ORAL EVERY 4 HOURS PRN
Status: DISCONTINUED | OUTPATIENT
Start: 2017-08-08 | End: 2017-08-11 | Stop reason: HOSPADM

## 2017-08-08 RX ORDER — ADHESIVE BANDAGE
30 BANDAGE TOPICAL 2 TIMES DAILY PRN
Status: DISCONTINUED | OUTPATIENT
Start: 2017-08-09 | End: 2017-08-11 | Stop reason: HOSPADM

## 2017-08-08 RX ORDER — HYDROCORTISONE 25 MG/G
CREAM TOPICAL 3 TIMES DAILY PRN
Status: DISCONTINUED | OUTPATIENT
Start: 2017-08-08 | End: 2017-08-11 | Stop reason: HOSPADM

## 2017-08-08 RX ORDER — DOCUSATE SODIUM 100 MG/1
200 CAPSULE, LIQUID FILLED ORAL 2 TIMES DAILY
Status: DISCONTINUED | OUTPATIENT
Start: 2017-08-08 | End: 2017-08-11 | Stop reason: HOSPADM

## 2017-08-08 RX ORDER — OXYTOCIN/RINGER'S LACTATE 20/1000 ML
10 PLASTIC BAG, INJECTION (ML) INTRAVENOUS ONCE
Status: COMPLETED | OUTPATIENT
Start: 2017-08-08 | End: 2017-08-08

## 2017-08-08 RX ORDER — OXYTOCIN/RINGER'S LACTATE 20/1000 ML
333 PLASTIC BAG, INJECTION (ML) INTRAVENOUS CONTINUOUS
Status: DISPENSED | OUTPATIENT
Start: 2017-08-08 | End: 2017-08-08

## 2017-08-08 RX ADMIN — HYDROMORPHONE HYDROCHLORIDE 0.3 MG: 2 INJECTION INTRAMUSCULAR; INTRAVENOUS; SUBCUTANEOUS at 03:08

## 2017-08-08 RX ADMIN — HYDROMORPHONE HYDROCHLORIDE 0.5 MG: 2 INJECTION INTRAMUSCULAR; INTRAVENOUS; SUBCUTANEOUS at 03:08

## 2017-08-08 RX ADMIN — ACETAMINOPHEN 650 MG: 325 TABLET ORAL at 03:08

## 2017-08-08 RX ADMIN — KETOROLAC TROMETHAMINE 30 MG: 30 INJECTION, SOLUTION INTRAMUSCULAR at 09:08

## 2017-08-08 RX ADMIN — FENTANYL CITRATE 100 MCG: 50 INJECTION, SOLUTION INTRAMUSCULAR; INTRAVENOUS at 02:08

## 2017-08-08 RX ADMIN — Medication 2 UNITS: at 03:08

## 2017-08-08 RX ADMIN — SODIUM CITRATE AND CITRIC ACID MONOHYDRATE 30 ML: 500; 334 SOLUTION ORAL at 02:08

## 2017-08-08 RX ADMIN — SODIUM CHLORIDE, SODIUM LACTATE, POTASSIUM CHLORIDE, AND CALCIUM CHLORIDE: 600; 310; 30; 20 INJECTION, SOLUTION INTRAVENOUS at 03:08

## 2017-08-08 RX ADMIN — LIDOCAINE HYDROCHLORIDE,EPINEPHRINE BITARTRATE 5 ML: 20; .005 INJECTION, SOLUTION EPIDURAL; INFILTRATION; INTRACAUDAL; PERINEURAL at 03:08

## 2017-08-08 RX ADMIN — ACETAMINOPHEN 650 MG: 325 TABLET ORAL at 09:08

## 2017-08-08 RX ADMIN — MAGNESIUM SULFATE HEPTAHYDRATE 2 G/HR: 40 INJECTION, SOLUTION INTRAVENOUS at 01:08

## 2017-08-08 RX ADMIN — FENTANYL CITRATE 25 MCG: 50 INJECTION, SOLUTION INTRAMUSCULAR; INTRAVENOUS at 03:08

## 2017-08-08 RX ADMIN — CEFAZOLIN SODIUM 2 G: 2 SOLUTION INTRAVENOUS at 03:08

## 2017-08-08 RX ADMIN — KETOROLAC TROMETHAMINE 30 MG: 30 INJECTION, SOLUTION INTRAMUSCULAR; INTRAVENOUS at 04:08

## 2017-08-08 RX ADMIN — FAMOTIDINE 20 MG: 10 INJECTION, SOLUTION INTRAVENOUS at 02:08

## 2017-08-08 RX ADMIN — FENTANYL CITRATE 50 MCG: 50 INJECTION, SOLUTION INTRAMUSCULAR; INTRAVENOUS at 03:08

## 2017-08-08 RX ADMIN — BUTALBITAL, ACETAMINOPHEN AND CAFFEINE 2 TABLET: 50; 325; 40 TABLET ORAL at 02:08

## 2017-08-08 RX ADMIN — DOCUSATE SODIUM 200 MG: 100 CAPSULE, LIQUID FILLED ORAL at 09:08

## 2017-08-08 RX ADMIN — FENTANYL CITRATE 25 MCG: 50 INJECTION, SOLUTION INTRAMUSCULAR; INTRAVENOUS at 04:08

## 2017-08-08 RX ADMIN — KETOROLAC TROMETHAMINE 30 MG: 30 INJECTION, SOLUTION INTRAMUSCULAR at 03:08

## 2017-08-08 RX ADMIN — AZITHROMYCIN MONOHYDRATE 500 MG: 500 INJECTION, POWDER, LYOPHILIZED, FOR SOLUTION INTRAVENOUS at 03:08

## 2017-08-08 RX ADMIN — ACETAMINOPHEN 1000 MG: 10 INJECTION, SOLUTION INTRAVENOUS at 04:08

## 2017-08-08 RX ADMIN — LIDOCAINE HYDROCHLORIDE,EPINEPHRINE BITARTRATE 5 ML: 20; .005 INJECTION, SOLUTION EPIDURAL; INFILTRATION; INTRACAUDAL; PERINEURAL at 02:08

## 2017-08-08 RX ADMIN — ONDANSETRON 4 MG: 2 INJECTION INTRAMUSCULAR; INTRAVENOUS at 03:08

## 2017-08-08 RX ADMIN — MIDAZOLAM HYDROCHLORIDE 1 MG: 1 INJECTION, SOLUTION INTRAMUSCULAR; INTRAVENOUS at 03:08

## 2017-08-08 RX ADMIN — BUPIVACAINE HYDROCHLORIDE 7 ML: 2.5 INJECTION, SOLUTION INFILTRATION; PERINEURAL at 02:08

## 2017-08-08 RX ADMIN — Medication 33.33 MILLI-UNITS/MIN: at 04:08

## 2017-08-08 NOTE — PROGRESS NOTES
Labor Progress Note        Subjective:      Patient currently with 3-4/10 HA.  Pt had a gush of fluid with nausea about 1.5 hours ago when she vomited    Objective:      Temp:  [95.9 °F (35.5 °C)-98 °F (36.7 °C)] 96.6 °F (35.9 °C)  Pulse:  [] 83  Resp:  [16-20] 18  SpO2:  [94 %-100 %] 98 %  BP: (116-167)/(69-94) 136/82  Body mass index is 25.94 kg/m².     General: no acute distress  Electronic Fetal Monitoring:  FHT: 130 bpm, minimal variability, accelerations absent, decelerations present - early decelerations  Category: 2                 TOCO: Contractions: regular, every 1-2 minutes     Cervical ripening balloon palpated in vagina and removed  /-2, fetal head palpated c/w rom     Assessment:     1. IUP at  here for induction of labor due to pre-eclampsia with severe features  2. GBS negative  3. Category 2 FHT     Plan:     1. Continue active management of labor  2. Will give D5 to help with fetal variability  3. fioricet for TEZ Ortega DO

## 2017-08-08 NOTE — TRANSFER OF CARE
"Anesthesia Transfer of Care Note    Patient: Olga Prater    Procedure(s) Performed: Procedure(s) (LRB):  DELIVERY- SECTION (N/A)    Patient location: PACU    Anesthesia Type: epidural    Transport from OR: Transported from OR on room air with adequate spontaneous ventilation    Post pain: adequate analgesia    Post assessment: no apparent anesthetic complications    Post vital signs: stable    Level of consciousness: awake    Nausea/Vomiting: nausea and no vomiting    Complications: none    Transfer of care protocol was followed      Last vitals:   Visit Vitals  BP (!) 159/96   Pulse (!) 118   Temp 35.9 °C (96.6 °F)   Resp 18   Ht 5' 4" (1.626 m)   Wt 68.6 kg (151 lb 2 oz)   LMP 2016   SpO2 100%   BMI 25.94 kg/m²     "

## 2017-08-08 NOTE — PROGRESS NOTES
Previously asked nurse to turn off pitocin and place o2 due to absent variability and decelerations (early vs late - difficult to tell)  Since that time, she has continued to have absent variability and decelerations.    Discussed fetal status with Olga who was in agreement to move forward with  delivery.     Didi Ortega, DO

## 2017-08-08 NOTE — DISCHARGE INSTRUCTIONS
Breastfeeding Discharge Instructions       Feed the baby at the earliest sign of hunger or comfort  o Hands to mouth, sucking motions  o Rooting or searching for something to suck on  o Dont wait for crying - it is a sign of distress     The feedings may be 8-12 times per 24hrs and will not follow a schedule   Avoid pacifiers and bottles for the first 4 weeks   Alternate the breast you start the feeding with, or start with the breast that feels the fullest   Switch breasts when the baby takes himself off the breast or falls asleep   Keep offering breasts until the baby looks full, no longer gives hunger signs, and stays asleep when placed on his back in the crib   If the baby is sleepy and wont wake for a feeding, put the baby skin-to-skin dressed in a diaper against the mothers bare chest   Sleep near your baby   The baby should be positioned and latched on to the breast correctly  o Chest-to-chest, chin in the breast  o Babys lips are flipped outward  o Babys mouth is stretched open wide like a shout  o Babys sucking should feel like tugging to the mother  - The baby should be drinking at the breast:  o You should hear swallowing or gulping throughout the feeding  o You should see milk on the babys lips when he comes off the breast  o Your breasts should be softer when the baby is finished feeding  o The baby should look relaxed at the end of feedings  o After the 4th day and your milk is in:  o The babys poop should turn bright yellow and be loose, watery, and seedy  o The baby should have at least 3-4 poops the size of the palm of your hand per day  o The baby should have at least 5-6 wet diapers per day  o The urine should be light yellow in color  You should drink when you are thirsty and eat a healthy diet when you are    hungry.     Take naps to get the rest you need.   Take medications and/or drink alcohol only with permission of your obstetrician    or the babys pediatrician.  You can  also call the [facility] Risk [facility],   ([phone]), Monday-Friday, 8am-5pm Central time, to get the most   up-to-date evidence-based information on the use of medications during   pregnancy and breastfeeding.      The baby should be examined by a pediatrician at 3-5 days of age.  Once your   milk comes in, the baby should be gaining at least ½ - 1oz each day and should be back to birthweight no later than 10-14 days of age.          Community Resources    [facility] Breastfeeding Warmline: [phone]   Local [facility] clinics: provide incentives and breastpumps to eligible mothers  [employer] Leche [employer] (LLLI):  mother-to-mother support group website        www.IP Ghoster.Character Booster  Local La Leche League mother-to-mother support groups:        www.Noovo        [place] League [place]   Dr. Christofer Frank website for latch videos and general information:        www.breastfeedinginc.ca  [facility] is a call center that provides information about the safety of taking medications while breastfeeding.  Call 1-554.825.7408, M-F, 8am-5pm, [place].  International Lactation Consultant Association provides resources for assistance:        www.ilca.org  [facility]feeding [facility] provides informationand resources for parents  and the community    http://[facility] Hospitalastfeeding.org     Faviola Mcekon is a mom-to-mom support group:                             www.Splash.FMcassyCodingpeople.com//breastfeedng-support/  Partners for Healthy Babies:  6-956-192-BABY(4433)  [employer]ait: a breastfeeding support group for women of color, [phone]

## 2017-08-08 NOTE — LACTATION NOTE
"   08/08/17 1150   Maternal Infant Assessment   Breast Shape Bilateral:;round   Breast Density Bilateral:  (soft to slightly firm)   Areola Bilateral:;firm   Nipple(s) Bilateral:;graspable   LATCH Score   Latch 1-->repeated attempts, holds nipple in mouth, stimulate to suck   Audible Swallowing 2-->spontaneous and intermittent (24 hrs old)   Type Of Nipple 2-->everted (after stimulation)   Comfort (Breast/Nipple) 2-->soft/nontender   Hold (Positioning) 0-->full assist (staff holds infant at breast)   Score (less than 7 for 2/more consecutive times, consult Lactation Consultant) 7   Maternal Infant Feeding   Maternal Emotional State assist needed   Infant Positioning clutch/"football"   Signs of Milk Transfer audible swallow   Presence of Pain no   Time Spent (min) 30-60 min   Latch Assistance yes   Feeding Infant   Feeding Readiness Cues rooting;sucking motion present   Satiety Cues calm after feeding   Skin-to-Skin Contact During Feeding yes   Lactation Referrals   Lactation Consult Breastfeeding assessment;Initial assessment   Lactation Interventions   Attachment Promotion breastfeeding assistance provided;counseling provided;skin-to-skin contact encouraged   Breastfeeding Assistance assisted with positioning;infant suck/swallow verified;feeding cue recognition promoted;infant latch-on verified   Maternal Breastfeeding Support diary/feeding log utilized;encouragement offered;lactation counseling provided;maternal rest encouraged   Latch Promotion positioning assisted;infant moved to breast   Assisted mother with breastfeeding session. Baby initially had difficultly with achieving a deep latch and suck dimples present. After several attempts and with full assistance baby nursed well with frequent audible swallows.   "

## 2017-08-08 NOTE — L&D DELIVERY NOTE
[facility]   Section   Operative Note    SUMMARY     Date of Procedure: 2017     Procedure: Procedure(s) (LRB):  DELIVERY- SECTION (N/A)    Surgeon(s) and Role:     * Didi Ortega DO - Primary    Assisting Surgeon: Monika Sanchez MD for uterine incision repair    Pre-Operative Diagnosis: 38 weeks gestation of pregnancy [Z3A.38]  Severe preeclampsia, third trimester [O14.13]    Post-Operative Diagnosis: Post-Op Diagnosis Codes:     * 38 weeks gestation of pregnancy [Z3A.38]     * Severe preeclampsia, third trimester [O14.13]    Anesthesia: Spinal/Epidural    Technical Procedures Used: low transverse uterine incision via pfannenstiel skin incision           Description of the Findings of the Procedure: meconium noted on uterine incision, nuchal cord x 4    Significant Surgical Tasks Conducted by the Assistant(s), if Applicable: uterine repair    Complications: No    Blood Loss: 980ml     With patient in supine position, the legs are  and Kunz Catheter placed and positioning to supine done.   Abdomen prepped with Chloroprep and 3 minute drying time allowed prior to draping of the abdomen.   Time out taken with OR team members.  Pfannenstiel Incision made through the skin, transverse fascial incision developed, rectus muscles  in the midline and the peritoneum entered.   no adhesions noted.  The lower uterine segment and position of the fetus identified.   Bladder flap taken down through transverse peritoneal incision.    Low Transverse Incision made through well developer lower uterine segment and extended laterally with blunt dissection. Pablo retractor was placed.   thickmeconium fluid noted.  Infant delivered from vertex presentation.  Cord clamped immediately and  handed to attending nurse.  Cord gas and cord blood taken, placenta delivered.  The uterus wasnot exteriorized.  The edges of the uterine incision were closed with running lock 0  Chromic, starting at each angle, tying in the midline. A second layer of the same suture was used as an imbricating stitch.   Observation for bleeding with suture of any bleeding along the hysterotomy line.   With good hemostasis noted, the anterior pelvis is rinsed with sterile saline.   Right and left adnexa with normal anatomy.  The Pablo retractor was removed.      Closure of the abdomen with 2.0 Vicryl running of the peritoneum, fascial closure with 0 vicryl starting at the left angle and tying the knot at the right angle.  Skin closure with 4 0 Vicryl subcuticular.  Wound dressed with pressure dressing.          Specimens:   Specimen (12h ago through future)    Start     Ordered    17  Specimen to Pathology - Surgery  Once     Comments:  Placenta of 38 weeks 3 days. Pt delivered 17 @0328.      17 0456    17  Specimen to Pathology - Surgery  Once      17          Condition: Good    Disposition: PACU - hemodynamically stable.    Attestation: Joe Ortega DO         Delivery Information for  Cisco Prater    Birth information:  YOB: 2016   Time of birth: 3:28 AM   Sex: male   Head Delivery Date/Time:     Delivery type:    Gestational Age: 38w3d           Measurements    Weight:  2770 g Length:  50.2 cm   Head circum.:  33 cm Chest circum.:  30.5 cm          [place]pton Assessment    Apgars:     1 Minute:   5 Minute:   10 Minute:   15 Minute:   20 Minute:     Skin Color:   0  1       Heart Rate:   1  2       Reflex Irritability:   0  2       Muscle Tone:   0  1       Respiratory Effort:   0  2       Total:   1  8               Apgars Assigned By:  BY NICU TEAM                         Interventions/Resuscitation         Cord    No data filed              Labor Events:       labor: No     Labor Onset Date/Time:         Dilation Complete Date/Time:         Start Pushing Date/Time:       Rupture Date/Time:              Rupture  type:           Fluid Amount:        Fluid Color:        Fluid Odor:        Membrane Status (PeriCalm): SRM (Spontaneous Rupture)      Rupture Date/Time (PeriCalm): 2017 23:30:00      Fluid Amount (PeriCalm): Small      Fluid Color (PeriCalm): Clear       steroids: None     Antibiotics given for GBS: No     Induction:       Indications for induction:        Augmentation:       Indications for augmentation:       Labor complications:       Additional complications:          Cervical ripening:                     Delivery:      Episiotomy:       Indication for Episiotomy:       Perineal Lacerations:   Repaired:      Periurethral Laceration:   Repaired:     Labial Laceration:   Repaired:     Sulcus Laceration:   Repaired:     Vaginal Laceration:   Repaired:     Cervical Laceration:   Repaired:     Repair suture:       Repair # of packets:       Vaginal delivery QBL (mL):        QBL (mL): 0     Combined Blood Loss (mL): 0     Vaginal Sweep Performed:       Surgicount Correct:         Other providers:            Details (if applicable):  Trial of Labor      Categorization:      Priority:     Indications for :     Incision Type:       Additional  information:  Forceps:    Vacuum:    Breech:    Observed anomalies    Other (Comments):

## 2017-08-08 NOTE — PROGRESS NOTES
Dr. Ortega notified of pt's report of headache pain 4/10.  Orders for PO fioricet received.  Will continue to monitor.

## 2017-08-08 NOTE — PROGRESS NOTES
Labor Progress Note        Subjective:      Patient currently not feeling well.  She is unable to describe exactly what it is, she is nauseas and hot from magnesium.  No shortness of breath.  Pain controlled with epidural.      Objective:      Temp:  [95.9 °F (35.5 °C)-98 °F (36.7 °C)] 95.9 °F (35.5 °C)  Pulse:  [] 82  Resp:  [16-20] 18  SpO2:  [97 %-100 %] 98 %  BP: (129-167)/(69-94) 129/72  Body mass index is 25.94 kg/m².     General: pale, shaky from epidural, normal affect and behavior  Resp: non-labored, clear to ausculation  Heart: RRR  Abd: soft, gravid  Ext: SCD's in place  Neuro: 2+ DTR's    Electronic Fetal Monitoring:  FHT: 140 bpm, minimal variability, accelerations absent, decelerations absent   Category: 2, overall reassuring                 TOCO: Contractions: regular, every 1-2 minutes     Cervix 2/80/-3, cervical ripening balloon still in place     Assessment:     1. IUP at  here for induction of labor due to pre-eclampsia with severe features - on magnesium  2. Not feeling well - perhaps due to magnesium.  Will get mag level and repeat pre-e labs.  Mag turned off until labs returned.    3. GBS negative  4. Category 2, overall reassuring FHT     Plan:     1. Continue active management of labor  2. Follow up labs and patient status    Didi Ortega DO

## 2017-08-09 LAB
BASOPHILS # BLD AUTO: 0.01 K/UL
BASOPHILS NFR BLD: 0.1 %
DIFFERENTIAL METHOD: ABNORMAL
EOSINOPHIL # BLD AUTO: 0 K/UL
EOSINOPHIL NFR BLD: 0.2 %
ERYTHROCYTE [DISTWIDTH] IN BLOOD BY AUTOMATED COUNT: 14.2 %
HCT VFR BLD AUTO: 27.4 %
HGB BLD-MCNC: 9 G/DL
LYMPHOCYTES # BLD AUTO: 1.8 K/UL
LYMPHOCYTES NFR BLD: 13.7 %
MCH RBC QN AUTO: 27.8 PG
MCHC RBC AUTO-ENTMCNC: 32.8 G/DL
MCV RBC AUTO: 85 FL
MONOCYTES # BLD AUTO: 0.8 K/UL
MONOCYTES NFR BLD: 5.7 %
NEUTROPHILS # BLD AUTO: 10.5 K/UL
NEUTROPHILS NFR BLD: 80.1 %
PLATELET # BLD AUTO: 212 K/UL
PMV BLD AUTO: 11.3 FL
RBC # BLD AUTO: 3.24 M/UL
WBC # BLD AUTO: 13.11 K/UL

## 2017-08-09 PROCEDURE — 25000003 PHARM REV CODE 250: Performed by: ANESTHESIOLOGY

## 2017-08-09 PROCEDURE — 36415 COLL VENOUS BLD VENIPUNCTURE: CPT

## 2017-08-09 PROCEDURE — 85025 COMPLETE CBC W/AUTO DIFF WBC: CPT

## 2017-08-09 PROCEDURE — 25000003 PHARM REV CODE 250: Performed by: OBSTETRICS & GYNECOLOGY

## 2017-08-09 PROCEDURE — 11000001 HC ACUTE MED/SURG PRIVATE ROOM

## 2017-08-09 PROCEDURE — 99024 POSTOP FOLLOW-UP VISIT: CPT | Mod: ,,, | Performed by: OBSTETRICS & GYNECOLOGY

## 2017-08-09 RX ORDER — IBUPROFEN 400 MG/1
400 TABLET ORAL EVERY 6 HOURS PRN
Status: DISCONTINUED | OUTPATIENT
Start: 2017-08-09 | End: 2017-08-09

## 2017-08-09 RX ORDER — IBUPROFEN 600 MG/1
600 TABLET ORAL EVERY 6 HOURS PRN
Status: DISCONTINUED | OUTPATIENT
Start: 2017-08-09 | End: 2017-08-10

## 2017-08-09 RX ADMIN — OXYCODONE HYDROCHLORIDE AND ACETAMINOPHEN 1 TABLET: 10; 325 TABLET ORAL at 12:08

## 2017-08-09 RX ADMIN — ACETAMINOPHEN 650 MG: 325 TABLET ORAL at 05:08

## 2017-08-09 RX ADMIN — OXYCODONE HYDROCHLORIDE AND ACETAMINOPHEN 1 TABLET: 5; 325 TABLET ORAL at 08:08

## 2017-08-09 RX ADMIN — OXYCODONE HYDROCHLORIDE 5 MG: 5 TABLET ORAL at 03:08

## 2017-08-09 RX ADMIN — IBUPROFEN 600 MG: 600 TABLET, FILM COATED ORAL at 08:08

## 2017-08-09 RX ADMIN — DOCUSATE SODIUM 200 MG: 100 CAPSULE, LIQUID FILLED ORAL at 09:08

## 2017-08-09 RX ADMIN — ONDANSETRON 8 MG: 8 TABLET, ORALLY DISINTEGRATING ORAL at 09:08

## 2017-08-09 RX ADMIN — OXYCODONE HYDROCHLORIDE AND ACETAMINOPHEN 1 TABLET: 10; 325 TABLET ORAL at 04:08

## 2017-08-09 RX ADMIN — DOCUSATE SODIUM 200 MG: 100 CAPSULE, LIQUID FILLED ORAL at 08:08

## 2017-08-09 RX ADMIN — OXYCODONE HYDROCHLORIDE AND ACETAMINOPHEN 1 TABLET: 10; 325 TABLET ORAL at 08:08

## 2017-08-09 RX ADMIN — IBUPROFEN 600 MG: 600 TABLET, FILM COATED ORAL at 02:08

## 2017-08-09 NOTE — ANESTHESIA POSTPROCEDURE EVALUATION
"Anesthesia Post Evaluation    Patient: Olga Prater    Procedure(s) Performed: Procedure(s) (LRB):  DELIVERY- SECTION (N/A)    Final Anesthesia Type: epidural  Patient location during evaluation: labor & delivery  Patient participation: Yes- Able to Participate  Level of consciousness: awake and alert and oriented  Post-procedure vital signs: reviewed and stable  Pain management: adequate  Airway patency: patent  PONV status at discharge: No PONV  Anesthetic complications: no      Cardiovascular status: blood pressure returned to baseline and hemodynamically stable  Respiratory status: unassisted, room air and spontaneous ventilation  Hydration status: euvolemic  Follow-up not needed.        Visit Vitals  /75   Pulse 94   Temp 37.1 °C (98.7 °F) (Oral)   Resp 18   Ht 5' 4" (1.626 m)   Wt 68.6 kg (151 lb 2 oz)   LMP 2016   SpO2 95%   Breastfeeding? Unknown   BMI 25.94 kg/m²       Pain/Nidia Score: Pain Rating Prior to Med Admin: 5 (2017  8:12 AM)  Pain Rating Post Med Admin: 5 (2017  6:46 AM)      "

## 2017-08-09 NOTE — NURSING
Upon initial round pt alert in bed. Pt denies blurred vision, chest pain, RUQ pain, and headache. Pt instructed to notify staff of any of the above symptoms occur. Will cont to monitor pt.

## 2017-08-09 NOTE — NURSING
Dr. Jimenes notified that pt's scheduled dose of acetaminophen will put the pt over the recommended daily max of 3,000mg by 600mg. Instructed ok to give scheduled dose as long as the pt does not exceed 4,000mg.

## 2017-08-09 NOTE — PROGRESS NOTES
[facility]  Obstetrics  Postpartum Progress Note    Patient Name: Olga Prater  MRN: [id number]  Admission Date: 2017  Hospital Length of Stay: 2 days  Attending Physician: Mohan Pastrana MD  Primary Care Provider: Primary Doctor No    Subjective:     Principal Problem:Postpartum care and examination immediately after delivery    Hospital course: Admit to L&D for IOL for preeclampsia for severe features (+HA).  Pt underwent primary  section for nonreassuring fetal heart tones on  while on magnesium sulfate for her preeclampsia.  POD 1: doing well, no headaches, plans to have Dr. Aguila in the NICU do her baby's circumcision.    Interval History: has planned for Dr. Aguila to do her baby's circumcision.    She is doing well this morning. She is tolerating a regular diet without nausea or vomiting. She is voiding spontaneously. She is ambulating. She has passed flatus, and has not a BM. Vaginal bleeding is mild. She denies fever or chills. Abdominal pain is mild and controlled with oral medications. She is breastfeeding. She desires circumcision for her male baby: yes.    Objective:     Vital Signs (Most Recent):  Temp: 98.7 °F (37.1 °C) (17 0809)  Pulse: 94 (17 0809)  Resp: 18 (17 0809)  BP: 121/75 (17 0809)  SpO2: 95 % (17 0500) Vital Signs (24h Range):  Temp:  [97.7 °F (36.5 °C)-98.7 °F (37.1 °C)] 98.7 °F (37.1 °C)  Pulse:  [] 94  Resp:  [17-18] 18  SpO2:  [95 %-99 %] 95 %  BP: (116-139)/(63-87) 121/75     Weight: 68.6 kg (151 lb 2 oz)  Body mass index is 25.94 kg/m².      Intake/Output Summary (Last 24 hours) at 17 1010  Last data filed at 17 0600   Gross per 24 hour   Intake              800 ml   Output             3885 ml   Net            -3085 ml       Significant Labs:  Lab Results   Component Value Date    GROUPTRH O POS 2017    HEPBSAG Negative 2017    STREPBCULT No Group B Streptococcus isolated  2017       Recent Labs  Lab 17  0523   HGB 9.0*   HCT 27.4*       I have personallly reviewed all pertinent lab results from the last 24 hours.  Recent Lab Results       17  0523      Baso # 0.01     Basophil% 0.1     Differential Method Automated     Eos # 0.0     Eosinophil% 0.2     Gran # 10.5(H)     Gran% 80.1(H)     Hematocrit 27.4(L)     Hemoglobin 9.0(L)     Lymph # 1.8     Lymph% 13.7(L)     MCH 27.8     MCHC 32.8     MCV 85     Mono # 0.8     Mono% 5.7     MPV 11.3     Platelets 212     RBC 3.24(L)     RDW 14.2     WBC 13.11(H)           Physical Exam:   Constitutional: She is oriented to person, place, and time. She appears well-developed and well-nourished. No distress.    HENT:   Head: Normocephalic and atraumatic.    Eyes: EOM are normal.    Neck: Normal range of motion. Neck supple.    Cardiovascular: Normal rate, regular rhythm and normal heart sounds.     Pulmonary/Chest: Effort normal and breath sounds normal. No respiratory distress.        Abdominal: Soft. Bowel sounds are normal. She exhibits abdominal incision. She exhibits no distension. There is no tenderness. There is no rebound and no guarding.   Incision: clean/dry/intact     Genitourinary:   Genitourinary Comments: Lochia: mild rubra  Fundus: involuting, firm, at umbilicus and nontender, fibroids palpable and nontender           Musculoskeletal: Normal range of motion and moves all extremeties. She exhibits no edema or tenderness.       Neurological: She is alert and oriented to person, place, and time. No cranial nerve deficit.    Skin: Skin is warm and dry. No rash noted. She is not diaphoretic. No erythema. No pallor.    Psychiatric: She has a normal mood and affect. Her behavior is normal. Judgment and thought content normal.       Assessment/Plan:     32 y.o. female  for: postop day #1/postpartum day #1 care    Non-reassuring fetal heart rate, delivered, current hospitalization    Infant in room, stable         38 weeks gestation of pregnancy    Delivered and doing well, postop day #1        Severe preeclampsia    Delivered and stable now, s/p Mag sulfate x 24 hours pp        * Postpartum care and examination immediately after delivery    Postop day #1, s/p primary , doing well      Routine pp care            Disposition: As patient meets milestones, will plan to discharge in 2 days.    Misty You MD  Obstetrics  [facility]

## 2017-08-09 NOTE — SUBJECTIVE & OBJECTIVE
Hospital course: Admit to L&D for IOL for preeclampsia for severe features (+HA).  Pt underwent primary  section for nonreassuring fetal heart tones on  while on magnesium sulfate for her preeclampsia.  POD 1: doing well, no headaches, plans to have Dr. Aguila in the NICU do her baby's circumcision.    Interval History: has planned for Dr. Aguila to do her baby's circumcision.    She is doing well this morning. She is tolerating a regular diet without nausea or vomiting. She is voiding spontaneously. She is ambulating. She has passed flatus, and has not a BM. Vaginal bleeding is mild. She denies fever or chills. Abdominal pain is mild and controlled with oral medications. She is breastfeeding. She desires circumcision for her male baby: yes.    Objective:     Vital Signs (Most Recent):  Temp: 98.7 °F (37.1 °C) (17 0809)  Pulse: 94 (17 08)  Resp: 18 (17 08)  BP: 121/75 (17 08)  SpO2: 95 % (17 0500) Vital Signs (24h Range):  Temp:  [97.7 °F (36.5 °C)-98.7 °F (37.1 °C)] 98.7 °F (37.1 °C)  Pulse:  [] 94  Resp:  [17-18] 18  SpO2:  [95 %-99 %] 95 %  BP: (116-139)/(63-87) 121/75     Weight: 68.6 kg (151 lb 2 oz)  Body mass index is 25.94 kg/m².      Intake/Output Summary (Last 24 hours) at 17 1010  Last data filed at 17 0600   Gross per 24 hour   Intake              800 ml   Output             3885 ml   Net            -3085 ml       Significant Labs:  Lab Results   Component Value Date    GROUPTRH O POS 2017    HEPBSAG Negative 2017    STREPBCULT No Group B Streptococcus isolated 2017       Recent Labs  Lab 17  05   HGB 9.0*   HCT 27.4*       I have personallly reviewed all pertinent lab results from the last 24 hours.  Recent Lab Results       17  05      Baso # 0.01     Basophil% 0.1     Differential Method Automated     Eos # 0.0     Eosinophil% 0.2     Gran # 10.5(H)     Gran% 80.1(H)     Hematocrit 27.4(L)      Hemoglobin 9.0(L)     Lymph # 1.8     Lymph% 13.7(L)     MCH 27.8     MCHC 32.8     MCV 85     Mono # 0.8     Mono% 5.7     MPV 11.3     Platelets 212     RBC 3.24(L)     RDW 14.2     WBC 13.11(H)           Physical Exam:   Constitutional: She is oriented to person, place, and time. She appears well-developed and well-nourished. No distress.    HENT:   Head: Normocephalic and atraumatic.    Eyes: EOM are normal.    Neck: Normal range of motion. Neck supple.    Cardiovascular: Normal rate, regular rhythm and normal heart sounds.     Pulmonary/Chest: Effort normal and breath sounds normal. No respiratory distress.        Abdominal: Soft. Bowel sounds are normal. She exhibits abdominal incision. She exhibits no distension. There is no tenderness. There is no rebound and no guarding.   Incision: clean/dry/intact     Genitourinary:   Genitourinary Comments: Lochia: mild rubra  Fundus: involuting, firm, at umbilicus and nontender, fibroids palpable and nontender           Musculoskeletal: Normal range of motion and moves all extremeties. She exhibits no edema or tenderness.       Neurological: She is alert and oriented to person, place, and time. No cranial nerve deficit.    Skin: Skin is warm and dry. No rash noted. She is not diaphoretic. No erythema. No pallor.    Psychiatric: She has a normal mood and affect. Her behavior is normal. Judgment and thought content normal.

## 2017-08-10 PROBLEM — D25.9 UTERINE FIBROID COMPLICATING ANTENATAL CARE, BABY NOT YET DELIVERED: Status: RESOLVED | Noted: 2017-01-03 | Resolved: 2017-08-10

## 2017-08-10 PROBLEM — O99.013 ANEMIA AFFECTING PREGNANCY IN THIRD TRIMESTER: Status: RESOLVED | Noted: 2017-03-14 | Resolved: 2017-08-10

## 2017-08-10 PROBLEM — O34.10 UTERINE FIBROID COMPLICATING ANTENATAL CARE, BABY NOT YET DELIVERED: Status: RESOLVED | Noted: 2017-01-03 | Resolved: 2017-08-10

## 2017-08-10 PROCEDURE — 99024 POSTOP FOLLOW-UP VISIT: CPT | Mod: ,,, | Performed by: OBSTETRICS & GYNECOLOGY

## 2017-08-10 PROCEDURE — 25000003 PHARM REV CODE 250: Performed by: OBSTETRICS & GYNECOLOGY

## 2017-08-10 PROCEDURE — 11000001 HC ACUTE MED/SURG PRIVATE ROOM

## 2017-08-10 RX ORDER — DOCUSATE SODIUM 100 MG/1
200 CAPSULE, LIQUID FILLED ORAL 2 TIMES DAILY
Refills: 0 | COMMUNITY
Start: 2017-08-10 | End: 2017-09-26

## 2017-08-10 RX ORDER — OXYCODONE AND ACETAMINOPHEN 5; 325 MG/1; MG/1
1 TABLET ORAL EVERY 4 HOURS PRN
Qty: 45 TABLET | Refills: 0 | Status: SHIPPED | OUTPATIENT
Start: 2017-08-10 | End: 2017-09-26

## 2017-08-10 RX ADMIN — OXYCODONE HYDROCHLORIDE AND ACETAMINOPHEN 1 TABLET: 10; 325 TABLET ORAL at 12:08

## 2017-08-10 RX ADMIN — IBUPROFEN 600 MG: 600 TABLET, FILM COATED ORAL at 12:08

## 2017-08-10 RX ADMIN — OXYCODONE HYDROCHLORIDE AND ACETAMINOPHEN 1 TABLET: 10; 325 TABLET ORAL at 03:08

## 2017-08-10 RX ADMIN — OXYCODONE HYDROCHLORIDE AND ACETAMINOPHEN 1 TABLET: 10; 325 TABLET ORAL at 06:08

## 2017-08-10 RX ADMIN — OXYCODONE HYDROCHLORIDE AND ACETAMINOPHEN 1 TABLET: 10; 325 TABLET ORAL at 11:08

## 2017-08-10 RX ADMIN — DOCUSATE SODIUM 200 MG: 100 CAPSULE, LIQUID FILLED ORAL at 08:08

## 2017-08-10 RX ADMIN — MAGNESIUM HYDROXIDE 2400 MG: 400 SUSPENSION ORAL at 11:08

## 2017-08-10 RX ADMIN — IBUPROFEN 600 MG: 600 TABLET, FILM COATED ORAL at 06:08

## 2017-08-10 RX ADMIN — OXYCODONE HYDROCHLORIDE AND ACETAMINOPHEN 1 TABLET: 10; 325 TABLET ORAL at 08:08

## 2017-08-10 NOTE — PROGRESS NOTES
[facility]  Obstetrics  Postpartum Progress Note    Patient Name: Olga Prater  MRN: [id number]  Admission Date: 2017  Hospital Length of Stay: 3 days  Attending Physician: Mohan Pastrana MD  Primary Care Provider: Primary Doctor No    Subjective:     Principal Problem:Postpartum care and examination immediately after delivery    Hospital course: Admit to L&D for IOL for preeclampsia for severe features (+HA).  Pt underwent primary  section for nonreassuring fetal heart tones on  while on magnesium sulfate for her preeclampsia.  POD 1: doing well, no headaches, plans to have Dr. Aguila in the NICU do her baby's circumcision.  POD 2: Patient is frustrated re difficulty with breastfeeding, looking forward to working with lactation today.    Interval History:     She is doing well this morning. She is tolerating a regular diet without nausea or vomiting. She is voiding spontaneously. She is ambulating. She has passed flatus, and has not a BM. Vaginal bleeding is mild. She denies fever or chills. Abdominal pain is moderate and controlled with oral medications. She is breastfeeding.     Objective:     Vital Signs (Most Recent):  Temp: 98.3 °F (36.8 °C) (08/10/17 0800)  Pulse: 74 (08/10/17 0800)  Resp: 18 (08/10/17 0800)  BP: 134/79 (08/10/17 0800)  SpO2: 95 % (08/10/17 0800) Vital Signs (24h Range):  Temp:  [98.3 °F (36.8 °C)-99.3 °F (37.4 °C)] 98.3 °F (36.8 °C)  Pulse:  [70-93] 74  Resp:  [16-18] 18  SpO2:  [95 %-98 %] 95 %  BP: (114-139)/(68-79) 134/79     Weight: 68.6 kg (151 lb 2 oz)  Body mass index is 25.94 kg/m².      Intake/Output Summary (Last 24 hours) at 08/10/17 1035  Last data filed at 08/10/17 0000   Gross per 24 hour   Intake                0 ml   Output              400 ml   Net             -400 ml       Significant Labs:  Lab Results   Component Value Date    GROUPTRH O POS 2017    HEPBSAG Negative 2017    STREPBCULT No Group B Streptococcus isolated  2017       Recent Labs  Lab 17  0523   HGB 9.0*   HCT 27.4*       I have personallly reviewed all pertinent lab results from the last 24 hours.    Physical Exam:   Constitutional: She is oriented to person, place, and time. She appears well-developed and well-nourished.        Pulmonary/Chest: Effort normal.        Abdominal: Soft. She exhibits abdominal incision (Clean, dry and intact). Tenderness: Mild incisional tenderness, no fundal tenderness.     Genitourinary: Uterus normal.           Musculoskeletal: She exhibits edema (1+ edema). She exhibits no tenderness.       Neurological: She is alert and oriented to person, place, and time.     Psychiatric: She has a normal mood and affect.       Assessment/Plan:     32 y.o. female  for:    Severe preeclampsia    Delivered and stable now, s/p Mag sulfate x 24 hours pp. BP reassuring but continue to avoid NSAIDs, monitor BP as patient is at risk of postpartum pre-eclampsia.        * Postpartum care and examination immediately after delivery    Routine postop care            Disposition: As patient meets milestones, will plan to discharge in am.    Jigna Leal MD  Obstetrics  [facility]

## 2017-08-10 NOTE — PLAN OF CARE
Problem: Patient Care Overview  Goal: Plan of Care Review  Outcome: Ongoing (interventions implemented as appropriate)  Pt to follow basic education.

## 2017-08-10 NOTE — SUBJECTIVE & OBJECTIVE
Hospital course: Admit to L&D for IOL for preeclampsia for severe features (+HA).  Pt underwent primary  section for nonreassuring fetal heart tones on  while on magnesium sulfate for her preeclampsia.  POD 1: doing well, no headaches, plans to have Dr. Aguila in the NICU do her baby's circumcision.  POD 2: Patient is frustrated re difficulty with breastfeeding, looking forward to working with lactation today.    Interval History:     She is doing well this morning. She is tolerating a regular diet without nausea or vomiting. She is voiding spontaneously. She is ambulating. She has passed flatus, and has not a BM. Vaginal bleeding is mild. She denies fever or chills. Abdominal pain is moderate and controlled with oral medications. She is breastfeeding.     Objective:     Vital Signs (Most Recent):  Temp: 98.3 °F (36.8 °C) (08/10/17 0800)  Pulse: 74 (08/10/17 0800)  Resp: 18 (08/10/17 0800)  BP: 134/79 (08/10/17 0800)  SpO2: 95 % (08/10/17 08) Vital Signs (24h Range):  Temp:  [98.3 °F (36.8 °C)-99.3 °F (37.4 °C)] 98.3 °F (36.8 °C)  Pulse:  [70-93] 74  Resp:  [16-18] 18  SpO2:  [95 %-98 %] 95 %  BP: (114-139)/(68-79) 134/79     Weight: 68.6 kg (151 lb 2 oz)  Body mass index is 25.94 kg/m².      Intake/Output Summary (Last 24 hours) at 08/10/17 1035  Last data filed at 08/10/17 0000   Gross per 24 hour   Intake                0 ml   Output              400 ml   Net             -400 ml       Significant Labs:  Lab Results   Component Value Date    GROUPTRH O POS 2017    HEPBSAG Negative 2017    STREPBCULT No Group B Streptococcus isolated 2017       Recent Labs  Lab 17  0523   HGB 9.0*   HCT 27.4*       I have personallly reviewed all pertinent lab results from the last 24 hours.    Physical Exam:   Constitutional: She is oriented to person, place, and time. She appears well-developed and well-nourished.        Pulmonary/Chest: Effort normal.        Abdominal: Soft. She exhibits  abdominal incision (Clean, dry and intact). Tenderness: Mild incisional tenderness, no fundal tenderness.     Genitourinary: Uterus normal.           Musculoskeletal: She exhibits edema (1+ edema). She exhibits no tenderness.       Neurological: She is alert and oriented to person, place, and time.     Psychiatric: She has a normal mood and affect.

## 2017-08-10 NOTE — PLAN OF CARE
Problem: Patient Care Overview  Goal: Plan of Care Review  Vss, pt ambulating in room, voiding, pain controlled with po pain meds

## 2017-08-10 NOTE — PROGRESS NOTES
Storenvyhony pump, tubing, collections containers and labels brought to bedside.  Discussed proper pump setting, Preemie+ or Standard for babys gestational age.  Instructed on proper usage of pump and to adjust suction according to maximum comfort level.  Verified appropriate flange fit.  Educated on the frequency and duration of pumping in order to promote and maintain a full milk supply.  Hands on pumping technique reviewed.  Encouraged hand expression after pumping.  Instructed on cleaning of breast pump parts.  Written instructions also given.  Pt verbalized understanding and appropriate recall for proper milk handling, collection, labeling, storage and transportation.

## 2017-08-10 NOTE — LACTATION NOTE
"   08/10/17 1100   Maternal Infant Assessment   Breast Shape round   Breast Density filling   Areola dense   Nipple(s) everted;other (see comments)  (small nipples)   Infant Assessment   Sucking Reflex present   Rooting Reflex present   Swallow Reflex present   Skin Color pink   LATCH Score   Latch 0-->too sleepy or reluctant, no latch achieved       Number Scale   Presence of Pain denies   Location nipple(s)   Maternal Infant Feeding   Maternal Preparation breast care   Maternal Emotional State assist needed;tense   Infant Positioning clutch/"football";cross-cradle   Time Spent (min) 15-30 min   Latch Assistance yes   Breastfeeding Education adequate infant intake;importance of skin-to-skin contact;milk expression, electric pump;milk expression, hand   Feeding Infant   Skin-to-Skin Contact During Feeding yes   Lactation Interventions   Breastfeeding Assistance assisted with positioning;feeding cue recognition promoted;feeding on demand promoted;support offered   Maternal Breastfeeding Support diary/feeding log utilized;encouragement offered;lactation counseling provided;maternal hydration promoted;maternal nutrition promoted;maternal rest encouraged   Latch Promotion positioning assisted;infant moved to breast   Infant pulling away from breast and crying. Attempted with shield, no latch, mother holding baby S2S, infant not easily consoled. Bundled, infant calmer and alert. Encouraged pt to allow baby to calm, attempt S2S and allow a self latch if possible, encouraged pumping and feeding ebm/abm. Encouraged warm compressions and massage prior to pumping. Pt and SO acknowledged understanding      "

## 2017-08-10 NOTE — ASSESSMENT & PLAN NOTE
Delivered and stable now, s/p Mag sulfate x 24 hours pp. BP reassuring but continue to avoid NSAIDs, monitor BP as patient is at risk of postpartum pre-eclampsia.

## 2017-08-10 NOTE — LACTATION NOTE
"   08/09/17 1720   Maternal Infant Assessment   Breast Shape Bilateral:;round   Breast Density Bilateral:;soft   Areola Bilateral:;elastic   Nipple(s) Bilateral:;everted   Infant Assessment   Sucking Reflex present   Rooting Reflex present   Swallow Reflex present   LATCH Score   Latch 1-->repeated attempts, holds nipple in mouth, stimulate to suck   Audible Swallowing 1-->a few with stimulation   Type Of Nipple 2-->everted (after stimulation)   Comfort (Breast/Nipple) 2-->soft/nontender   Hold (Positioning) 1-->minimal assist, teach one side: mother does other, staff holds   Score (less than 7 for 2/more consecutive times, consult Lactation Consultant) 7   Maternal Infant Feeding   Maternal Emotional State assist needed   Infant Positioning clutch/"football"   Signs of Milk Transfer audible swallow   Time Spent (min) 15-30 min   Latch Assistance yes   Breastfeeding History   Currently Breastfeeding yes   Feeding Infant   Effective Latch During Feeding yes   Audible Swallow yes   Suck/Swallow Coordination present   Lactation Referrals   Lactation Consult Breastfeeding assessment;Follow up   Lactation Interventions   Breastfeeding Assistance feeding cue recognition promoted;infant latch-on verified;assisted with positioning;support offered;infant suck/swallow verified   Maternal Breastfeeding Support lactation counseling provided   Latch Promotion positioning assisted;infant moved to breast   assisted pt with position and latch. Baby able to latch to right breast in football hold. Rhythmic sucking observed and pt shown how to use breast compression and stimulation to keep baby actively nursing. Breastfeeding education given. Questions answered.  "

## 2017-08-10 NOTE — PLAN OF CARE
Problem: Breastfeeding (Adult,Obstetrics,Pediatric)  Goal: Signs and Symptoms of Listed Potential Problems Will be Absent, Minimized or Managed (Breastfeeding)  Signs and symptoms of listed potential problems will be absent, minimized or managed by discharge/transition of care (reference Breastfeeding (Adult,Obstetrics,Pediatric) CPG).   Outcome: Ongoing (interventions implemented as appropriate)   08/10/17 1100   Breastfeeding   Problems Assessed (Breastfeeding) all   Problems Present (Breastfeeding) ineffective breastfeeding;situational response   Lactation plan of acre discussed with patient. POC including S2S, hand expression, pumping, attempting self latch and feeding ebm and abm. Pt and So acknowledge understanding

## 2017-08-11 VITALS
SYSTOLIC BLOOD PRESSURE: 131 MMHG | BODY MASS INDEX: 25.8 KG/M2 | WEIGHT: 151.13 LBS | RESPIRATION RATE: 18 BRPM | OXYGEN SATURATION: 98 % | TEMPERATURE: 99 F | HEART RATE: 96 BPM | DIASTOLIC BLOOD PRESSURE: 83 MMHG | HEIGHT: 64 IN

## 2017-08-11 PROCEDURE — 25000003 PHARM REV CODE 250: Performed by: OBSTETRICS & GYNECOLOGY

## 2017-08-11 PROCEDURE — 99024 POSTOP FOLLOW-UP VISIT: CPT | Mod: ,,, | Performed by: OBSTETRICS & GYNECOLOGY

## 2017-08-11 RX ORDER — LABETALOL 100 MG/1
100 TABLET, FILM COATED ORAL EVERY 12 HOURS
Status: DISCONTINUED | OUTPATIENT
Start: 2017-08-11 | End: 2017-08-11 | Stop reason: HOSPADM

## 2017-08-11 RX ORDER — NIFEDIPINE 30 MG/1
30 TABLET, EXTENDED RELEASE ORAL DAILY
Qty: 30 TABLET | Refills: 11 | Status: SHIPPED | OUTPATIENT
Start: 2017-08-11 | End: 2017-08-15 | Stop reason: SDUPTHER

## 2017-08-11 RX ADMIN — OXYCODONE HYDROCHLORIDE AND ACETAMINOPHEN 1 TABLET: 10; 325 TABLET ORAL at 04:08

## 2017-08-11 RX ADMIN — OXYCODONE HYDROCHLORIDE AND ACETAMINOPHEN 1 TABLET: 10; 325 TABLET ORAL at 12:08

## 2017-08-11 RX ADMIN — LABETALOL HYDROCHLORIDE 100 MG: 100 TABLET, FILM COATED ORAL at 06:08

## 2017-08-11 RX ADMIN — OXYCODONE HYDROCHLORIDE AND ACETAMINOPHEN 1 TABLET: 10; 325 TABLET ORAL at 09:08

## 2017-08-11 NOTE — PROGRESS NOTES
"Reported pt's elevated BP of 158/87 and tachy HR of 105 to Dr. Leal. Pt c/o REAGAN at this time, denies blurry vision/epigastric pain/RUQ pain. Pt does state, "I was having some pain in the middle of my chest earlier, but it's gone now." Reported all of the above findings to Dr. Leal who orders Q1H BPs until midnight, report back any SBP > 150, DBP > 100, or if HA is not resolved w/pain medication. Will continue to monitor.   "

## 2017-08-11 NOTE — ASSESSMENT & PLAN NOTE
Delivered and stable now, s/p Mag sulfate x 24 hours pp.   3 BP's overnight were mild range, one systolic >150. Discussed with Dr Pastrana - will start Nife 30XL for the PP period. RTC in 1 week for BP check

## 2017-08-11 NOTE — PROGRESS NOTES
[facility]  Obstetrics  Postpartum Progress Note    Patient Name: Olga Prater  MRN: [id number]  Admission Date: 2017  Hospital Length of Stay: 4 days  Attending Physician: Mohan Pastrana MD  Primary Care Provider: Primary Doctor No    Subjective:     Principal Problem:Postpartum care and examination immediately after delivery    Hospital course: Admit to L&D for IOL for preeclampsia for severe features (+HA).  Pt underwent primary  section for nonreassuring fetal heart tones on  while on magnesium sulfate for her preeclampsia.  POD 1: doing well, no headaches, plans to have Dr. Aguila in the NICU do her baby's circumcision.  POD 2: Patient is frustrated re difficulty with breastfeeding, looking forward to working with lactation today.  POD 3: no complaints, desires discharge today      She is doing well this morning. She is tolerating a regular diet without nausea or vomiting. She is voiding spontaneously. She is ambulating. She has passed flatus, and has a BM. Vaginal bleeding is mild. She denies fever or chills. Abdominal pain is mild and controlled with oral medications. She is breastfeeding.   Objective:     Vital Signs (Most Recent):  Temp: 98.6 °F (37 °C) (17 0802)  Pulse: 96 (17 08)  Resp: 18 (17 08)  BP: 131/83 (17 08)  SpO2: 98 % (17 08) Vital Signs (24h Range):  Temp:  [97.8 °F (36.6 °C)-99.3 °F (37.4 °C)] 98.6 °F (37 °C)  Pulse:  [] 96  Resp:  [16-20] 18  SpO2:  [93 %-100 %] 98 %  BP: (129-158)/(78-87) 131/83     Weight: 68.6 kg (151 lb 2 oz)  Body mass index is 25.94 kg/m².    No intake or output data in the 24 hours ending 17 1047    Significant Labs:  Lab Results   Component Value Date    GROUPTRH O POS 2017    HEPBSAG Negative 2017    STREPBCULT No Group B Streptococcus isolated 2017     No results for input(s): HGB, HCT in the last 48 hours.    I have personallly reviewed all pertinent lab  results from the last 24 hours.    Physical Exam:   Constitutional: She is oriented to person, place, and time. She appears well-developed and well-nourished. No distress.    HENT:   Head: Normocephalic and atraumatic.      Cardiovascular: Normal rate and regular rhythm.     Pulmonary/Chest: Effort normal. No respiratory distress.        Abdominal: Soft. Bowel sounds are normal. She exhibits no mass. Abdominal incision: c/d/i.     Genitourinary: Uterus normal. No vaginal discharge found.   Genitourinary Comments: Firm fundus below umbilicus           Musculoskeletal: Normal range of motion and moves all extremeties.       Neurological: She is alert and oriented to person, place, and time.    Skin: Skin is warm.    Psychiatric: She has a normal mood and affect. Her behavior is normal. Judgment and thought content normal.       Assessment/Plan:     32 y.o. female  for:    Severe preeclampsia    Delivered and stable now, s/p Mag sulfate x 24 hours pp.   3 BP's overnight were mild range, one systolic >150. Discussed with Dr Pastrana - will start Nife 30XL for the PP period. RTC in 1 week for BP check        * Postpartum care and examination immediately after delivery    Routine post op care, discharge patient home today             Disposition: As patient meets milestones, will plan to discharge today.    Rita Centeno MD  Obstetrics  [facility]

## 2017-08-11 NOTE — DISCHARGE SUMMARY
[facility]  Obstetrics  Discharge Summary      Patient Name: Olga Prater  MRN: [id number]  Admission Date: 2017  Hospital Length of Stay: 4 days  Discharge Date and Time:  2017 10:55 AM  Attending Physician: Mohan Pastrana MD   Discharging Provider: Rita Centeno MD  Primary Care Provider: Primary Doctor No    HPI: 32 y.o.  @ 38w2d with PreE with severe features.  History significant for uterine fibroids.     Procedure(s) (LRB):  DELIVERY- SECTION (N/A)     Hospital Course:   Admit to L&D for IOL for preeclampsia for severe features (+HA).  Pt underwent primary  section for nonreassuring fetal heart tones on  while on magnesium sulfate for her preeclampsia.  POD 1: doing well, no headaches, plans to have Dr. Aguila in the NICU do her baby's circumcision.  POD 2: Patient is frustrated re difficulty with breastfeeding, looking forward to working with lactation today.  POD 3: no complaints, desires discharge today. Nife 30XL was started for mild range Bps the evening prior to discharge - discussed with Dr Pastrana    Consults         Status Ordering Provider     Inpatient consult to Anesthesiology  Once     Provider:  (Not yet assigned)    Acknowledged RITA CENTENO          Final Active Diagnoses:    Diagnosis Date Noted POA    PRINCIPAL PROBLEM:  Postpartum care and examination immediately after delivery [Z39.0] 2017 Not Applicable    Severe preeclampsia [O14.10] 2017 Yes      Problems Resolved During this Admission:    Diagnosis Date Noted Date Resolved POA    38 weeks gestation of pregnancy [Z3A.38]  08/10/2017 Not Applicable    Non-reassuring fetal heart rate, delivered, current hospitalization [O76]  08/10/2017 Unknown        Labs: All labs within the past 24 hours have been reviewed    Feeding Method: breast    Immunizations     Date Immunization Status Dose Route/Site Given by    17 0523 MMR Incomplete 0.5 mL  Subcutaneous/Left deltoid     17 0523 Tdap Incomplete 0.5 mL Intramuscular/Left deltoid           Delivery:    Episiotomy: None   Lacerations: None   Repair suture:     Repair # of packets: 12   Blood loss (ml): 0     Birth information:  YOB: 2016   Time of birth: 3:28 AM   Sex: male   Delivery type: , Low Transverse   Gestational Age: 38w3d    Delivery Clinician:      Other providers:       Additional  information:  Forceps:    Vacuum:    Breech:    Observed anomalies      Living?:           APGARS  One minute Five minutes Ten minutes   Skin color:         Heart rate:         Grimace:         Muscle tone:         Breathing:         Totals: 1  8        Placenta: Delivered:       appearance    Pending Diagnostic Studies:     None          Discharged Condition: good    Disposition: Home or Self Care    Follow Up:  Follow-up Information     Mohan Pastrana MD In 1 week.    Specialties:  Obstetrics and Gynecology, Obstetrics, Gynecology  Why:  Blood Pressure Check  Contact information:  [address]  [address]  [facility] [id number] [phone]             Mohan Pastrana MD In 2 weeks.    Specialties:  Obstetrics and Gynecology, Obstetrics, Gynecology  Why:  Post Op  Contact information:  [address]  [address]  [facility] [address] [phone]             Mohan Pastrana MD In 6 weeks.    Specialties:  Obstetrics and Gynecology, Obstetrics, Gynecology  Why:  Postpartum  Contact information:  [address]  [address]  [place] [id number] [phone]                 Patient Instructions:     Diet general     Call MD for:  temperature >100.4     Call MD for:  persistent nausea and vomiting or diarrhea     Call MD for:  severe uncontrolled pain     Call MD for:  redness, tenderness, or signs of infection (pain, swelling, redness, odor or green/yellow discharge around incision site)     Call MD for:  severe persistent headache     Call MD for:  persistent dizziness,  light-headedness, or visual disturbances       Medications:  Current Discharge Medication List      START taking these medications    Details   docusate sodium (COLACE) 100 MG capsule Take 2 capsules (200 mg total) by mouth 2 (two) times daily.  Refills: 0      nifedipine (PROCARDIA-XL) 30 MG (OSM) 24 hr tablet Take 1 tablet (30 mg total) by mouth once daily.  Qty: 30 tablet, Refills: 11      oxycodone-acetaminophen (PERCOCET) 5-325 mg per tablet Take 1 tablet by mouth every 4 (four) hours as needed.  Qty: 45 tablet, Refills: 0         CONTINUE these medications which have NOT CHANGED    Details   calcium carbonate (TUMS) 200 mg calcium (500 mg) chewable tablet Take 1 tablet by mouth once daily.      PRENATAL VIT CALC,IRON,FOLIC (PRENATAL VITAMIN ORAL) Take by mouth.         STOP taking these medications       OMEPRAZOLE (PRILOSEC ORAL) Comments:   Reason for Stopping:               Rita Centeno MD  Obstetrics  [facility]

## 2017-08-11 NOTE — LACTATION NOTE
08/11/17 1020   Maternal Infant Assessment   Breast Shape round   Breast Density filling   Areola elastic   Nipple(s) everted;other (see comments)  (short, small)   Infant Assessment   Sucking Reflex present   Rooting Reflex present   Swallow Reflex present   Skin Color pink;yellow (jaundice)   LATCH Score   Latch 0-->too sleepy or reluctant, no latch achieved       Number Scale   Presence of Pain denies   Location nipple(s)   Maternal Infant Feeding   Maternal Preparation breast care   Maternal Emotional State independent   Time Spent (min) 15-30 min   Engorgement Measures complete emptying encouraged;supportive bra encouraged;warm shower encouraged   Breastfeeding Education adequate infant intake;adequate milk volume;diet;importance of skin-to-skin contact;increasing milk supply;label/storage of breast milk;medication effects;milk expression, electric pump   Feeding Infant   Effective Latch During Feeding no   Skin-to-Skin Contact During Feeding yes   Equipment Type/Education   Pump Type Pump 'n Style   Breast Pump Type double electric, personal   Breast Pump Flange Type hard   Breast Pump Flange Size 24 mm   Breast Pumping Bilateral Breasts:   Pumping Frequency (times) (everytime infant feeds supplement)   Lactation Interventions   Attachment Promotion counseling provided;family involvement promoted;infant-mother separation minimized;skin-to-skin contact encouraged   Breastfeeding Assistance electric breast pump used;feeding cue recognition promoted;feeding on demand promoted;milk expression/pumping;supplemental feeding provided;support offered   Maternal Breastfeeding Support diary/feeding log utilized;encouragement offered;lactation counseling provided   Latch Promotion infant moved to breast   lactation education provided for dyad. Baby S2S with mother. Infant very fussy at breast and refusing to nurse. Encouraged mother to hold baby S2S, attempt to latch baby gently and allow infant to lick and mouth  nipple if baby is attempting. Pump breasts with symphony while still IP and pump with pump and style after discharge. Feed ebm with a wide based nipple using  paced bottle feeding. Feed ebm and then continue supplementation with formula if necessary. Patient acknowledged understanding. If DC today then call lactation for further assistance. Please call LC today for assistance, adequate milk supply discussed. Pt acknowledged understanding.

## 2017-08-11 NOTE — PLAN OF CARE
Problem: Breastfeeding (Adult,Obstetrics,Pediatric)  Goal: Signs and Symptoms of Listed Potential Problems Will be Absent, Minimized or Managed (Breastfeeding)  Signs and symptoms of listed potential problems will be absent, minimized or managed by discharge/transition of care (reference Breastfeeding (Adult,Obstetrics,Pediatric) CPG).   Outcome: Ongoing (interventions implemented as appropriate)   08/11/17 1020   Breastfeeding   Problems Assessed (Breastfeeding) all   Problems Present (Breastfeeding) ineffective breastfeeding;other (see comments)   Lactation plan of care discussed with patient. Discussed attempting to latch, skin to skin, pumping and feeding baby ebm and formula if necessary. Pt to FU with peds on Monday. Pt acknowledged understanding. Encouraged patient to call warm line, discussed latching baby on and if pt continues to have difficulty to call warm line for assistance. Acknowledged understanding.

## 2017-08-11 NOTE — SUBJECTIVE & OBJECTIVE
Hospital course: Admit to L&D for IOL for preeclampsia for severe features (+HA).  Pt underwent primary  section for nonreassuring fetal heart tones on  while on magnesium sulfate for her preeclampsia.  POD 1: doing well, no headaches, plans to have Dr. Aguila in the NICU do her baby's circumcision.  POD 2: Patient is frustrated re difficulty with breastfeeding, looking forward to working with lactation today.  POD 3: no complaints, desires discharge today      She is doing well this morning. She is tolerating a regular diet without nausea or vomiting. She is voiding spontaneously. She is ambulating. She has passed flatus, and has a BM. Vaginal bleeding is mild. She denies fever or chills. Abdominal pain is mild and controlled with oral medications. She is breastfeeding.   Objective:     Vital Signs (Most Recent):  Temp: 98.6 °F (37 °C) (17)  Pulse: 96 (17)  Resp: 18 (17)  BP: 131/83 (17)  SpO2: 98 % (17) Vital Signs (24h Range):  Temp:  [97.8 °F (36.6 °C)-99.3 °F (37.4 °C)] 98.6 °F (37 °C)  Pulse:  [] 96  Resp:  [16-20] 18  SpO2:  [93 %-100 %] 98 %  BP: (129-158)/(78-87) 131/83     Weight: 68.6 kg (151 lb 2 oz)  Body mass index is 25.94 kg/m².    No intake or output data in the 24 hours ending 17 1047    Significant Labs:  Lab Results   Component Value Date    GROUPTRH O POS 2017    HEPBSAG Negative 2017    STREPBCULT No Group B Streptococcus isolated 2017     No results for input(s): HGB, HCT in the last 48 hours.    I have personallly reviewed all pertinent lab results from the last 24 hours.    Physical Exam:   Constitutional: She is oriented to person, place, and time. She appears well-developed and well-nourished. No distress.    HENT:   Head: Normocephalic and atraumatic.      Cardiovascular: Normal rate and regular rhythm.     Pulmonary/Chest: Effort normal. No respiratory distress.        Abdominal: Soft. Bowel  sounds are normal. She exhibits no mass. Abdominal incision: c/d/i.     Genitourinary: Uterus normal. No vaginal discharge found.   Genitourinary Comments: Firm fundus below umbilicus           Musculoskeletal: Normal range of motion and moves all extremeties.       Neurological: She is alert and oriented to person, place, and time.    Skin: Skin is warm.    Psychiatric: She has a normal mood and affect. Her behavior is normal. Judgment and thought content normal.

## 2017-08-15 ENCOUNTER — POSTPARTUM VISIT (OUTPATIENT)
Dept: OBSTETRICS AND GYNECOLOGY | Facility: CLINIC | Age: 32
End: 2017-08-15
Payer: COMMERCIAL

## 2017-08-15 VITALS
DIASTOLIC BLOOD PRESSURE: 98 MMHG | SYSTOLIC BLOOD PRESSURE: 156 MMHG | HEIGHT: 64 IN | BODY MASS INDEX: 22.96 KG/M2 | WEIGHT: 134.5 LBS

## 2017-08-15 DIAGNOSIS — R30.0 DYSURIA: ICD-10-CM

## 2017-08-15 LAB
BILIRUB SERPL-MCNC: NORMAL MG/DL
BLOOD URINE, POC: 250
COLOR, POC UA: YELLOW
GLUCOSE UR QL STRIP: NORMAL
KETONES UR QL STRIP: NORMAL
LEUKOCYTE ESTERASE URINE, POC: NORMAL
NITRITE, POC UA: NORMAL
PH, POC UA: 5
PROTEIN, POC: NORMAL
SPECIFIC GRAVITY, POC UA: 1
UROBILINOGEN, POC UA: NORMAL

## 2017-08-15 PROCEDURE — 87086 URINE CULTURE/COLONY COUNT: CPT

## 2017-08-15 PROCEDURE — 99213 OFFICE O/P EST LOW 20 MIN: CPT | Mod: 24,25,S$GLB, | Performed by: OBSTETRICS & GYNECOLOGY

## 2017-08-15 PROCEDURE — 99999 PR PBB SHADOW E&M-EST. PATIENT-LVL III: CPT | Mod: PBBFAC,,, | Performed by: OBSTETRICS & GYNECOLOGY

## 2017-08-15 PROCEDURE — 3008F BODY MASS INDEX DOCD: CPT | Mod: S$GLB,,, | Performed by: OBSTETRICS & GYNECOLOGY

## 2017-08-15 PROCEDURE — 81002 URINALYSIS NONAUTO W/O SCOPE: CPT | Mod: S$GLB,,, | Performed by: OBSTETRICS & GYNECOLOGY

## 2017-08-15 RX ORDER — NITROFURANTOIN 25; 75 MG/1; MG/1
100 CAPSULE ORAL 2 TIMES DAILY
Qty: 14 CAPSULE | Refills: 0 | Status: SHIPPED | OUTPATIENT
Start: 2017-08-15 | End: 2017-08-22

## 2017-08-15 RX ORDER — NIFEDIPINE 30 MG/1
30 TABLET, EXTENDED RELEASE ORAL 2 TIMES DAILY
Qty: 60 TABLET | Refills: 11 | Status: SHIPPED | OUTPATIENT
Start: 2017-08-15 | End: 2017-09-26

## 2017-08-15 NOTE — PROGRESS NOTES
Subjective:   Patient reports no nausea or vomiting.    Activity level: Normal.    Pain control: having  Pain over her fibroid, moderate to light lochia  Reports no postpartum depression, overall doing well.   Takes procardia nightly.   Reporting dysuria since delivery        Objective:  Vitals:    08/15/17 1555   BP: (!) 156/98     General appearance: Comfortable and well-appearing.    Abdomen: Abdomen is soft, non distended   Tenderness: There is no abdominal tenderness.    Wound:  Clean.  There is no dehiscence.  There is no drainage.      Assessment:   s/p  delivery- 2 week post op  Condition: In stable condition.     Plan:  Increase procardia to BID  Run UA C&S and start macrobid  Encourage ambulation.  Continue wound care.  Pelvic rest for 6 weeks postpartum.

## 2017-08-17 LAB
BACTERIA UR CULT: NORMAL
BACTERIA UR CULT: NORMAL

## 2017-09-26 ENCOUNTER — POSTPARTUM VISIT (OUTPATIENT)
Dept: OBSTETRICS AND GYNECOLOGY | Facility: CLINIC | Age: 32
End: 2017-09-26
Payer: COMMERCIAL

## 2017-09-26 VITALS
HEIGHT: 62 IN | WEIGHT: 132.5 LBS | DIASTOLIC BLOOD PRESSURE: 80 MMHG | SYSTOLIC BLOOD PRESSURE: 128 MMHG | BODY MASS INDEX: 24.38 KG/M2

## 2017-09-26 DIAGNOSIS — D25.9 UTERINE LEIOMYOMA, UNSPECIFIED LOCATION: Primary | ICD-10-CM

## 2017-09-26 PROCEDURE — 99999 PR PBB SHADOW E&M-EST. PATIENT-LVL III: CPT | Mod: PBBFAC,,, | Performed by: OBSTETRICS & GYNECOLOGY

## 2017-09-26 NOTE — PROGRESS NOTES
"CC: 7 wk pp    Olga Prater is a 32 y.o. female  7 wk pp, formula feeding only.    Weaned herself off of the procardia.  Not bleeding.  Does coitus interruptus    Past Medical History:   Diagnosis Date    Fibroadenoma of right breast     Menarche     Age of onset 13    Migraine     Severe preeclampsia 2017       Past Surgical History:   Procedure Laterality Date    BREAST BIOPSY      fibroadenoma     SECTION      for NRFHTs, induction for Severe PreEclampsia    WISDOM TOOTH EXTRACTION         OB History    Para Term  AB Living   1 1 1 0 0 0   SAB TAB Ectopic Multiple Live Births   0 0 0 0        # Outcome Date GA Lbr Naldo/2nd Weight Sex Delivery Anes PTL Lv   1 Term 17 38w3d  2.77 kg (6 lb 1.7 oz) M CS-LTranv EPI, Spinal N       Complications: Fetal Intolerance,Severe preeclampsia          Family History   Problem Relation Age of Onset    Diabetes Father      Pre    Hypertension Father     Skin cancer Father     Cancer Father     Thyroid disease Mother     Hypertension Sister     Breast cancer Maternal Grandmother     Cancer Maternal Grandmother     Breast cancer Maternal Aunt     Colon cancer Neg Hx     Ovarian cancer Neg Hx     Melanoma Neg Hx        Social History   Substance Use Topics    Smoking status: Never Smoker    Smokeless tobacco: Never Used    Alcohol use No      Comment: social        /80   Ht 5' 2" (1.575 m)   Wt 60.1 kg (132 lb 7.9 oz)   LMP 2016   Breastfeeding? No Comment: Stopped Breastfeeding 17 -- Lmp 16  BMI 24.23 kg/m²     ROS:  GENERAL: Denies weight gain or weight loss. Feeling well overall.   SKIN: Denies rash or lesions.   HEAD: Denies head injury or headache.   NODES: Denies enlarged lymph nodes.   CHEST: Denies chest pain or shortness of breath.   CARDIOVASCULAR: Denies palpitations or left sided chest pain.   ABDOMEN: No abdominal pain, constipation, diarrhea, nausea, vomiting or rectal " bleeding.   URINARY: No frequency, dysuria, hematuria, or burning on urination.  REPRODUCTIVE: See HPI.   BREASTS: denies pain, lumps, or nipple discharge.   HEMATOLOGIC: No easy bruisability or excessive bleeding.  MUSCULOSKELETAL: Denies joint pain or swelling.   NEUROLOGIC: Denies syncope or weakness.   PSYCHIATRIC: Denies depression, anxiety or mood swings.    Physical Exam:    APPEARANCE: Well nourished, well developed, in no acute distress.  AFFECT: WNL, alert and oriented x 3  SKIN: No acne or hirsutism  NECK: Neck symmetric without masses or thyromegaly  NODES: No inguinal, cervical, axillary, or femoral lymph node enlargement  CHEST: Good respiratory effect  ABDOMEN: Soft.  No tenderness or masses.  No hepatosplenomegaly.  No hernias.  PELVIC: Normal external genitalia without lesions.  Normal hair distribution.  Adequate perineal body, normal urethral meatus.  Vagina moist and well rugated without lesions or discharge.  Cervix pink, without lesions, discharge or tenderness.  No significant cystocele or rectocele.  Bimanual exam shows uterus 18 WEEKS WITH MULTIPLE FIBROIDS.  Adnexa without masses or tenderness.    EXTREMITIES: No edema.    ASSESSMENT AND PLAN    Olga was seen today for postpartum care.    Diagnoses and all orders for this visit:    Uterine leiomyoma, unspecified location  -     US Pelvis Comp with Transvag NON-OB (xpd; Future  -     US Pelvis Comp with Transvag NON-OB (xpd; Future    get ultrasound, discussed timing of myomectomy    Return in about 6 weeks (around 11/7/2017).

## 2017-10-13 ENCOUNTER — TELEPHONE (OUTPATIENT)
Dept: OBSTETRICS AND GYNECOLOGY | Facility: CLINIC | Age: 32
End: 2017-10-13

## 2017-10-13 NOTE — TELEPHONE ENCOUNTER
Left message to discuss her ultrasound.  No change in fibroid size.  Can do earliest 6 mo pp but may want to give it time to go down.

## 2017-10-16 ENCOUNTER — TELEPHONE (OUTPATIENT)
Dept: OBSTETRICS AND GYNECOLOGY | Facility: CLINIC | Age: 32
End: 2017-10-16

## 2017-10-16 DIAGNOSIS — N92.0 MENORRHAGIA WITH REGULAR CYCLE: ICD-10-CM

## 2017-10-16 DIAGNOSIS — N92.2 EXCESSIVE MENSTRUATION AT PUBERTY: Primary | ICD-10-CM

## 2017-10-16 RX ORDER — NORGESTIMATE AND ETHINYL ESTRADIOL 0.25-0.035
1 KIT ORAL DAILY
Qty: 28 TABLET | Refills: 6 | Status: ON HOLD | OUTPATIENT
Start: 2017-10-16 | End: 2017-11-12 | Stop reason: HOSPADM

## 2017-10-16 NOTE — TELEPHONE ENCOUNTER
Zeina pt, started her period and is bleeding through a super plus tampon in 30 minutes and passing large clots. Pt has fibroids.Also,pt is returning Dr Pastrana's call from last week.

## 2017-10-16 NOTE — TELEPHONE ENCOUNTER
FYI Wax---I called pt. Passing some clots. 7 weeks postpartum. U/s showed 10 cm posterior fibroid. About the same size as in pregnancy. Soaked super plus in 30 min. Bleeding ok right now. I rec OCP until surgery. Pt will try. Has never really taken OCP in the past. Had PreE but BP normal at last visit. Rx Sprintec sent. Can take 2 a day if bleeding still heavy. rec call us back if bleeding is not improved.

## 2017-10-16 NOTE — TELEPHONE ENCOUNTER
Started period yesterday which was a normal flow and this AM she passed several large clots and soaking through super plus tampons in 30 minutes since 6am.  She is not on birth control, she is not breastfeeding. She had an US on 10/11 and has a large fibroid.  She denies SOB and dizziness, c/o fatigue.

## 2017-10-17 ENCOUNTER — LAB VISIT (OUTPATIENT)
Dept: LAB | Facility: HOSPITAL | Age: 32
End: 2017-10-17
Attending: OBSTETRICS & GYNECOLOGY
Payer: COMMERCIAL

## 2017-10-17 DIAGNOSIS — N92.0 MENORRHAGIA WITH REGULAR CYCLE: ICD-10-CM

## 2017-10-17 LAB
BASOPHILS # BLD AUTO: 0.07 K/UL
BASOPHILS NFR BLD: 0.8 %
DIFFERENTIAL METHOD: ABNORMAL
EOSINOPHIL # BLD AUTO: 0.2 K/UL
EOSINOPHIL NFR BLD: 2.5 %
ERYTHROCYTE [DISTWIDTH] IN BLOOD BY AUTOMATED COUNT: 12.4 %
HCT VFR BLD AUTO: 33.1 %
HGB BLD-MCNC: 10.4 G/DL
IMM GRANULOCYTES # BLD AUTO: 0.02 K/UL
IMM GRANULOCYTES NFR BLD AUTO: 0.2 %
LYMPHOCYTES # BLD AUTO: 2.7 K/UL
LYMPHOCYTES NFR BLD: 30.9 %
MCH RBC QN AUTO: 25.9 PG
MCHC RBC AUTO-ENTMCNC: 31.4 G/DL
MCV RBC AUTO: 83 FL
MONOCYTES # BLD AUTO: 0.7 K/UL
MONOCYTES NFR BLD: 8.2 %
NEUTROPHILS # BLD AUTO: 5 K/UL
NEUTROPHILS NFR BLD: 57.4 %
NRBC BLD-RTO: 0 /100 WBC
PLATELET # BLD AUTO: 268 K/UL
PMV BLD AUTO: 11.2 FL
RBC # BLD AUTO: 4.01 M/UL
WBC # BLD AUTO: 8.73 K/UL

## 2017-10-17 PROCEDURE — 85025 COMPLETE CBC W/AUTO DIFF WBC: CPT

## 2017-10-17 PROCEDURE — 36415 COLL VENOUS BLD VENIPUNCTURE: CPT

## 2017-10-17 PROCEDURE — 84443 ASSAY THYROID STIM HORMONE: CPT

## 2017-10-18 LAB — TSH SERPL DL<=0.005 MIU/L-ACNC: 1.2 UIU/ML

## 2017-11-02 ENCOUNTER — TELEPHONE (OUTPATIENT)
Dept: OBSTETRICS AND GYNECOLOGY | Facility: CLINIC | Age: 32
End: 2017-11-02

## 2017-11-02 ENCOUNTER — LAB VISIT (OUTPATIENT)
Dept: LAB | Facility: OTHER | Age: 32
End: 2017-11-02
Attending: OBSTETRICS & GYNECOLOGY
Payer: COMMERCIAL

## 2017-11-02 DIAGNOSIS — D50.0 IRON DEFICIENCY ANEMIA DUE TO CHRONIC BLOOD LOSS: ICD-10-CM

## 2017-11-02 DIAGNOSIS — D25.1 INTRAMURAL LEIOMYOMA OF UTERUS: ICD-10-CM

## 2017-11-02 DIAGNOSIS — N93.8 DUB (DYSFUNCTIONAL UTERINE BLEEDING): ICD-10-CM

## 2017-11-02 DIAGNOSIS — N93.8 DUB (DYSFUNCTIONAL UTERINE BLEEDING): Primary | ICD-10-CM

## 2017-11-02 LAB
BASOPHILS # BLD AUTO: 0.03 K/UL
BASOPHILS NFR BLD: 0.4 %
DIFFERENTIAL METHOD: ABNORMAL
EOSINOPHIL # BLD AUTO: 0.1 K/UL
EOSINOPHIL NFR BLD: 1.2 %
ERYTHROCYTE [DISTWIDTH] IN BLOOD BY AUTOMATED COUNT: 12.8 %
HCT VFR BLD AUTO: 30.5 %
HGB BLD-MCNC: 9.6 G/DL
LYMPHOCYTES # BLD AUTO: 2.5 K/UL
LYMPHOCYTES NFR BLD: 33.2 %
MCH RBC QN AUTO: 25 PG
MCHC RBC AUTO-ENTMCNC: 31.5 G/DL
MCV RBC AUTO: 79 FL
MONOCYTES # BLD AUTO: 0.7 K/UL
MONOCYTES NFR BLD: 9.9 %
NEUTROPHILS # BLD AUTO: 4.1 K/UL
NEUTROPHILS NFR BLD: 55.3 %
PLATELET # BLD AUTO: 380 K/UL
PMV BLD AUTO: 10.3 FL
RBC # BLD AUTO: 3.84 M/UL
WBC # BLD AUTO: 7.44 K/UL

## 2017-11-02 PROCEDURE — 36415 COLL VENOUS BLD VENIPUNCTURE: CPT

## 2017-11-02 PROCEDURE — 85025 COMPLETE CBC W/AUTO DIFF WBC: CPT

## 2017-11-02 RX ORDER — ONDANSETRON HYDROCHLORIDE 8 MG/1
8 TABLET, FILM COATED ORAL EVERY 8 HOURS PRN
Qty: 6 TABLET | Refills: 0 | Status: ON HOLD | OUTPATIENT
Start: 2017-11-02 | End: 2017-11-12 | Stop reason: HOSPADM

## 2017-11-02 RX ORDER — NORGESTIMATE AND ETHINYL ESTRADIOL 0.25-0.035
1 KIT ORAL DAILY
Qty: 28 TABLET | Refills: 0 | Status: ON HOLD | OUTPATIENT
Start: 2017-11-02 | End: 2017-11-12 | Stop reason: HOSPADM

## 2017-11-02 NOTE — TELEPHONE ENCOUNTER
Pt has been saturating a super plus tampon in 15/20 minutes for the past hour.  She is on Ortho cyclen since 10/16.  She started off on 2 pills a day for the first week but now only taking 1 a day.  Yesterday she started bleeding.  She is on the 3rd week of first pill pack.   She denies s/s of anemia.    Call at work [phone]    Dr. Reyes, Im sending this to you since you spoke with her last time she was having bleeding and Dr. Pastrana is out of the office.

## 2017-11-02 NOTE — TELEPHONE ENCOUNTER
FYI Wax---  Pt called with heavy bleeding. I spoke with pt. Soaking through super plus tampon in 20 min. Very heavy. She is at work right now at [facility]. rec go now to lab and get CBC drawn. I will call with results tomorrow. Now on one OCP daily, does not have pack with her. I sent ERx of OCP to pharm at [facility]. Go now and  pills and take 2-3 pills today. I will call her tomorrow with CBC and to see how she is doing. I rec move up myomectomy and do before the end of the year and not wait until 6 months. The fibroid did not get any smaller from pregnancy to last scan so I am not sure it is going to get any smaller. Pt agrees. She is ready to proceed with myomectomy. Will send to [facility] to verify insurance.    Requested Date:___before the end of the year_______________  Requested Time:__________________  Case Length:___2 hours__________________  Surgeon_____Wax______________ Co- Surgeon_____Paine_________________  Visit Type: OUTPATIENT or AM ADMIT______inpatient_____________________  PROCEDURE: Open Myomectomy  Anesthesia Type: general  Diagnosis: Intramural fibroid, Anemia from chronic blood loss, DUB  Comments/Special Equipment Needed: none  PCP clearance: no    Talk to Zeina about what dates work for her. I can do Friday mornings or Wednesday if that works for her. I am really free whenever and can move people if I need to.

## 2017-11-06 ENCOUNTER — TELEPHONE (OUTPATIENT)
Dept: OBSTETRICS AND GYNECOLOGY | Facility: CLINIC | Age: 32
End: 2017-11-06

## 2017-11-06 DIAGNOSIS — D50.0 ANEMIA DUE TO CHRONIC BLOOD LOSS: ICD-10-CM

## 2017-11-06 DIAGNOSIS — D25.1 FIBROIDS, INTRAMURAL: Primary | ICD-10-CM

## 2017-11-06 DIAGNOSIS — N93.8 DUB (DYSFUNCTIONAL UTERINE BLEEDING): ICD-10-CM

## 2017-11-07 ENCOUNTER — OFFICE VISIT (OUTPATIENT)
Dept: OBSTETRICS AND GYNECOLOGY | Facility: CLINIC | Age: 32
End: 2017-11-07
Payer: COMMERCIAL

## 2017-11-07 DIAGNOSIS — D25.9 UTERINE LEIOMYOMA, UNSPECIFIED LOCATION: ICD-10-CM

## 2017-11-07 DIAGNOSIS — N92.0 MENORRHAGIA WITH REGULAR CYCLE: ICD-10-CM

## 2017-11-07 DIAGNOSIS — N92.1 MENORRHAGIA WITH IRREGULAR CYCLE: Primary | ICD-10-CM

## 2017-11-07 PROCEDURE — 99999 PR PBB SHADOW E&M-EST. PATIENT-LVL I: CPT | Mod: PBBFAC,,, | Performed by: OBSTETRICS & GYNECOLOGY

## 2017-11-07 PROCEDURE — 99213 OFFICE O/P EST LOW 20 MIN: CPT | Mod: S$GLB,,, | Performed by: OBSTETRICS & GYNECOLOGY

## 2017-11-07 RX ORDER — MUPIROCIN 20 MG/G
OINTMENT TOPICAL
Status: CANCELLED | OUTPATIENT
Start: 2017-11-07

## 2017-11-07 NOTE — H&P
CC: f/u menorrhagia    Olga Prater is a 32 y.o. female  patient with large fibroid that complicated her pregnancy, not breastfeeding, now patient is flooding when bleeds.  Was started on ocp taper and when goes does to one pill daily having bleeding through it.  Wanted to wait until baby was 6 mo old to get myomectomy but having too much bleeding.  Since called last week bleeding has decreased but is on ocps BID.  No symptoms of anemia.  Was going through tampons every 20 minutes.      Past Medical History:   Diagnosis Date    Fibroadenoma of right breast     Menarche     Age of onset 13    Migraine     Severe preeclampsia 2017       Past Surgical History:   Procedure Laterality Date    BREAST BIOPSY      fibroadenoma     SECTION      for NRFHTs, induction for Severe PreEclampsia    WISDOM TOOTH EXTRACTION         OB History    Para Term  AB Living   1 1 1 0 0 0   SAB TAB Ectopic Multiple Live Births   0 0 0 0        # Outcome Date GA Lbr Naldo/2nd Weight Sex Delivery Anes PTL Lv   1 Term 17 38w3d  2.77 kg (6 lb 1.7 oz) M CS-LTranv EPI, Spinal N       Complications: Fetal Intolerance,Severe preeclampsia          Family History   Problem Relation Age of Onset    Diabetes Father      Pre    Hypertension Father     Skin cancer Father     Cancer Father     Thyroid disease Mother     Hypertension Sister     Breast cancer Maternal Grandmother     Cancer Maternal Grandmother     Breast cancer Maternal Aunt     Colon cancer Neg Hx     Ovarian cancer Neg Hx     Melanoma Neg Hx        Social History   Substance Use Topics    Smoking status: Never Smoker    Smokeless tobacco: Never Used    Alcohol use No      Comment: social        There were no vitals taken for this visit.    ROS:  GENERAL: Denies weight gain or weight loss. Feeling well overall.   SKIN: Denies rash or lesions.   HEAD: Denies head injury or headache.   NODES: Denies enlarged lymph nodes.    CHEST: Denies chest pain or shortness of breath.   CARDIOVASCULAR: Denies palpitations or left sided chest pain.   ABDOMEN: No abdominal pain, constipation, diarrhea, nausea, vomiting or rectal bleeding.   URINARY: No frequency, dysuria, hematuria, or burning on urination.  REPRODUCTIVE: See HPI.   BREASTS: denies pain, lumps, or nipple discharge.   HEMATOLOGIC: No easy bruisability or excessive bleeding.  MUSCULOSKELETAL: Denies joint pain or swelling.   NEUROLOGIC: Denies syncope or weakness.   PSYCHIATRIC: Denies depression, anxiety or mood swings.    Physical Exam:    APPEARANCE: Well nourished, well developed, in no acute distress.  AFFECT: WNL, alert and oriented x 3  SKIN: No acne or hirsutism  NECK: Neck symmetric without masses or thyromegaly  NODES: No inguinal, cervical, axillary, or femoral lymph node enlargement  CHEST: Good respiratory effect  ABDOMEN: Soft.  No tenderness or masses.  No hepatosplenomegaly.  No hernias.  PELVIC: Normal external genitalia without lesions.  Normal hair distribution.  Adequate perineal body, normal urethral meatus.  Vagina moist and well rugated without lesions or discharge.  Cervix pink, without lesions, discharge or tenderness.  No significant cystocele or rectocele.  Bimanual exam shows uterus to be normal size, regular, mobile and nontender.  Adnexa without masses or tenderness.    EXTREMITIES: No edema.    ASSESSMENT AND PLAN    Diagnoses and all orders for this visit:    Menorrhagia with irregular cycle    Uterine leiomyoma, unspecified location     Risks and benefits of myomectomy explained in detail including but not limited to damage to internal organs, including but not limited to bowel, bladder, uterus, tubes, ovaries, arteries, nerves, veins, possible need for blood transfusion, possible death, need for hysterectomy and scaring preventing future childbearing. Patient is aware of all risks and desires surgery. All questions answered. Consents signed. Patient  is aware and agrees with this plan.     Return in about 2 weeks (around 11/21/2017).

## 2017-11-08 ENCOUNTER — HOSPITAL ENCOUNTER (OUTPATIENT)
Dept: PREADMISSION TESTING | Facility: OTHER | Age: 32
Discharge: HOME | End: 2017-11-08
Attending: OBSTETRICS & GYNECOLOGY
Payer: COMMERCIAL

## 2017-11-08 ENCOUNTER — TELEPHONE (OUTPATIENT)
Dept: OBSTETRICS AND GYNECOLOGY | Facility: CLINIC | Age: 32
End: 2017-11-08

## 2017-11-08 ENCOUNTER — ANESTHESIA EVENT (OUTPATIENT)
Dept: SURGERY | Facility: OTHER | Age: 32
DRG: 742 | End: 2017-11-08
Payer: COMMERCIAL

## 2017-11-08 VITALS
HEIGHT: 61 IN | HEART RATE: 82 BPM | OXYGEN SATURATION: 100 % | DIASTOLIC BLOOD PRESSURE: 87 MMHG | WEIGHT: 130 LBS | SYSTOLIC BLOOD PRESSURE: 127 MMHG | TEMPERATURE: 98 F | BODY MASS INDEX: 24.55 KG/M2

## 2017-11-08 DIAGNOSIS — N92.1 MENORRHAGIA WITH IRREGULAR CYCLE: ICD-10-CM

## 2017-11-08 LAB
ABO + RH BLD: NORMAL
BASOPHILS # BLD AUTO: 0.04 K/UL
BASOPHILS NFR BLD: 0.7 %
BLD GP AB SCN CELLS X3 SERPL QL: NORMAL
DIFFERENTIAL METHOD: ABNORMAL
EOSINOPHIL # BLD AUTO: 0.1 K/UL
EOSINOPHIL NFR BLD: 2.2 %
ERYTHROCYTE [DISTWIDTH] IN BLOOD BY AUTOMATED COUNT: 12.9 %
HCT VFR BLD AUTO: 29.6 %
HGB BLD-MCNC: 9.4 G/DL
LYMPHOCYTES # BLD AUTO: 1.7 K/UL
LYMPHOCYTES NFR BLD: 32.1 %
MCH RBC QN AUTO: 24.7 PG
MCHC RBC AUTO-ENTMCNC: 31.8 G/DL
MCV RBC AUTO: 78 FL
MONOCYTES # BLD AUTO: 0.6 K/UL
MONOCYTES NFR BLD: 10.9 %
NEUTROPHILS # BLD AUTO: 2.9 K/UL
NEUTROPHILS NFR BLD: 53.9 %
PLATELET # BLD AUTO: 283 K/UL
PMV BLD AUTO: 10.6 FL
RBC # BLD AUTO: 3.81 M/UL
WBC # BLD AUTO: 5.39 K/UL

## 2017-11-08 PROCEDURE — 85025 COMPLETE CBC W/AUTO DIFF WBC: CPT

## 2017-11-08 PROCEDURE — 86900 BLOOD TYPING SEROLOGIC ABO: CPT

## 2017-11-08 PROCEDURE — 86850 RBC ANTIBODY SCREEN: CPT

## 2017-11-08 RX ORDER — SODIUM CHLORIDE, SODIUM LACTATE, POTASSIUM CHLORIDE, CALCIUM CHLORIDE 600; 310; 30; 20 MG/100ML; MG/100ML; MG/100ML; MG/100ML
INJECTION, SOLUTION INTRAVENOUS CONTINUOUS
Status: CANCELLED | OUTPATIENT
Start: 2017-11-08

## 2017-11-08 NOTE — TELEPHONE ENCOUNTER
Nely with pre admit calling in regards to this pt' Sx tomorrow. Needs to speak with Martha or Clementine and you both are out of the office. Martha will be back around 2:30. Please call Nely Ext [phone]

## 2017-11-08 NOTE — ANESTHESIA PREPROCEDURE EVALUATION
11/08/2017  Olga Prater is a 32 y.o., female.    Anesthesia Evaluation    I have reviewed the Patient Summary Reports.    I have reviewed the Nursing Notes.   I have reviewed the Medications.     Review of Systems  Anesthesia Hx:  No problems with previous Anesthesia  Denies Family Hx of Anesthesia complications.   Denies Personal Hx of Anesthesia complications.   Social:  Non-Smoker    Hematology/Oncology:     Oncology Normal    -- Anemia:   EENT/Dental:EENT/Dental Normal   Cardiovascular:   Hypertension    Pulmonary:  Pulmonary Normal    Renal/:  Renal/ Normal     Hepatic/GI:  Hepatic/GI Normal    Musculoskeletal:  Musculoskeletal Normal    Neurological:  Neurology Normal    Endocrine:  Endocrine Normal    Dermatological:  Skin Normal    Psych:  Psychiatric Normal           Physical Exam  General:  Well nourished    Airway/Jaw/Neck:  Airway Findings: Mouth Opening: Normal Mallampati: I      Dental:  Dental Findings: In tact              Anesthesia Plan  Type of Anesthesia, risks & benefits discussed:  Anesthesia Type:  general  Patient's Preference:   Intra-op Monitoring Plan: standard ASA monitors  Intra-op Monitoring Plan Comments:   Post Op Pain Control Plan: multimodal analgesia  Post Op Pain Control Plan Comments:   Induction:   IV  Beta Blocker:         Informed Consent: Patient understands risks and agrees with Anesthesia plan.  Questions answered. Anesthesia consent signed with patient.  ASA Score: 2     Day of Surgery Review of History & Physical:    H&P update referred to the surgeon.         Ready For Surgery From Anesthesia Perspective.

## 2017-11-08 NOTE — TELEPHONE ENCOUNTER
Spoke with Nely, they are still waiting on authorization for pt's surgery. They wanted to know if it was medically urgent so they could procedure with the surgery

## 2017-11-08 NOTE — DISCHARGE INSTRUCTIONS
PRE-ADMIT TESTING -  [phone]    [address]  [facility]          Your surgery has been scheduled at [facility]. We are pleased to have the opportunity to serve you. For Further Information please call [phone].    On the day of surgery please report to the Information Desk on the 1st floor.    · CONTACT YOUR PHYSICIAN'S OFFICE THE DAY PRIOR TO YOUR SURGERY TO OBTAIN YOUR ARRIVAL TIME.     · The evening before surgery do not eat anything after 9 p.m. ( this includes hard candy, chewing gum and mints).  You may only have GATORADE, POWERADE AND WATER  from 9 p.m. until you leave your home.   DO NOT DRINK ANY LIQUIDS ON THE WAY TO THE HOSPITAL.      SPECIAL MEDICATION INSTRUCTIONS: TAKE medications checked off by the Anesthesiologist on your Medication List.    Angiogram Patients: Take medications as instructed by your physician, including aspirin.     Surgery Patients:    If you take ASPIRIN - Your PHYSICIAN/SURGEON will need to inform you IF/OR when you need to stop taking aspirin prior to your surgery.     Do Not take any medications containing IBUPROFEN.  Do Not Wear any make-up or dark nail polish   (especially eye make-up) to surgery. If you come to surgery with makeup on you will be required to remove the makeup or nail polish.    Do not shave your surgical area at least 5 days prior to your surgery. The surgical prep will be performed at the hospital according to Infection Control regulations.    Leave all valuables at home.   Do Not wear any jewelry or watches, including any metal in body piercings.  Contact Lens must be removed before surgery. Either do not wear the contact lens or bring a case and solution for storage.  Please bring a container for eyeglasses or dentures as required.  Bring any paperwork your physician has provided, such as consent forms,  history and physicals, doctor's orders, etc.   Bring comfortable clothes that are loose fitting to wear upon  discharge. Take into consideration the type of surgery being performed.  Maintain your diet as advised per your physician the day prior to surgery.      Adequate rest the night before surgery is advised.   Park in the Parking lot behind the hospital or in the [facility] across the street from the parking lot. Parking is complimentary.  If you will be discharged the same day as your procedure, please arrange for a responsible adult to drive you home or to accompany you if traveling by taxi.   YOU WILL NOT BE PERMITTED TO DRIVE OR TO LEAVE THE HOSPITAL ALONE AFTER SURGERY.   It is strongly recommended that you arrange for someone to remain with you for the first 24 hrs following your surgery.       Thank you for your cooperation.  The Staff of [facility].        Bathing Instructions                                                                 Please shower the evening before and morning of your procedure with    ANTIBACTERIAL SOAP. ( DIAL, etc )  Concentrate on the surgical area   for at least 3 minutes and rinse completely. Dry off as usual.   Do not use any deodorant, powder, body lotions, perfume, after shave or    cologne.

## 2017-11-09 ENCOUNTER — HOSPITAL ENCOUNTER (INPATIENT)
Facility: OTHER | Age: 32
LOS: 3 days | Discharge: HOME | DRG: 742 | End: 2017-11-12
Attending: OBSTETRICS & GYNECOLOGY | Admitting: OBSTETRICS & GYNECOLOGY
Payer: COMMERCIAL

## 2017-11-09 ENCOUNTER — ANESTHESIA (OUTPATIENT)
Dept: SURGERY | Facility: OTHER | Age: 32
DRG: 742 | End: 2017-11-09
Payer: COMMERCIAL

## 2017-11-09 ENCOUNTER — SURGERY (OUTPATIENT)
Age: 32
End: 2017-11-09

## 2017-11-09 DIAGNOSIS — N92.0 MENORRHAGIA: ICD-10-CM

## 2017-11-09 DIAGNOSIS — Z98.890 STATUS POST MYOMECTOMY: ICD-10-CM

## 2017-11-09 DIAGNOSIS — Z98.890 S/P MYOMECTOMY: Primary | ICD-10-CM

## 2017-11-09 DIAGNOSIS — N92.1 MENORRHAGIA WITH IRREGULAR CYCLE: ICD-10-CM

## 2017-11-09 LAB
APTT BLDCRRT: 23.8 SEC
B-HCG UR QL: NEGATIVE
BASOPHILS # BLD AUTO: 0 K/UL
BASOPHILS NFR BLD: 0 %
BLD PROD TYP BPU: NORMAL
BLD PROD TYP BPU: NORMAL
BLOOD UNIT EXPIRATION DATE: NORMAL
BLOOD UNIT EXPIRATION DATE: NORMAL
BLOOD UNIT TYPE CODE: 5100
BLOOD UNIT TYPE CODE: 5100
BLOOD UNIT TYPE: NORMAL
BLOOD UNIT TYPE: NORMAL
CODING SYSTEM: NORMAL
CODING SYSTEM: NORMAL
CTP QC/QA: YES
DIFFERENTIAL METHOD: ABNORMAL
DISPENSE STATUS: NORMAL
DISPENSE STATUS: NORMAL
EOSINOPHIL # BLD AUTO: 0 K/UL
EOSINOPHIL NFR BLD: 0 %
ERYTHROCYTE [DISTWIDTH] IN BLOOD BY AUTOMATED COUNT: 14.8 %
HCT VFR BLD AUTO: 36.8 %
HGB BLD-MCNC: 12 G/DL
INR PPP: 1
LYMPHOCYTES # BLD AUTO: 0.4 K/UL
LYMPHOCYTES NFR BLD: 3.3 %
MCH RBC QN AUTO: 26.3 PG
MCHC RBC AUTO-ENTMCNC: 32.6 G/DL
MCV RBC AUTO: 81 FL
MONOCYTES # BLD AUTO: 0.6 K/UL
MONOCYTES NFR BLD: 4.9 %
NEUTROPHILS # BLD AUTO: 10.4 K/UL
NEUTROPHILS NFR BLD: 91.6 %
PLATELET # BLD AUTO: 205 K/UL
PMV BLD AUTO: 10.3 FL
POCT GLUCOSE: 96 MG/DL (ref 70–110)
PROTHROMBIN TIME: 10.7 SEC
RBC # BLD AUTO: 4.56 M/UL
TRANS ERYTHROCYTES VOL PATIENT: NORMAL ML
TRANS ERYTHROCYTES VOL PATIENT: NORMAL ML
WBC # BLD AUTO: 11.37 K/UL

## 2017-11-09 PROCEDURE — 25000003 PHARM REV CODE 250: Performed by: OBSTETRICS & GYNECOLOGY

## 2017-11-09 PROCEDURE — 63600175 PHARM REV CODE 636 W HCPCS: Performed by: ANESTHESIOLOGY

## 2017-11-09 PROCEDURE — 82962 GLUCOSE BLOOD TEST: CPT | Performed by: OBSTETRICS & GYNECOLOGY

## 2017-11-09 PROCEDURE — 85025 COMPLETE CBC W/AUTO DIFF WBC: CPT

## 2017-11-09 PROCEDURE — 37000008 HC ANESTHESIA 1ST 15 MINUTES: Performed by: OBSTETRICS & GYNECOLOGY

## 2017-11-09 PROCEDURE — 27201423 OPTIME MED/SURG SUP & DEVICES STERILE SUPPLY: Performed by: OBSTETRICS & GYNECOLOGY

## 2017-11-09 PROCEDURE — 94761 N-INVAS EAR/PLS OXIMETRY MLT: CPT

## 2017-11-09 PROCEDURE — 63600175 PHARM REV CODE 636 W HCPCS: Performed by: OBSTETRICS & GYNECOLOGY

## 2017-11-09 PROCEDURE — 71000033 HC RECOVERY, INTIAL HOUR: Performed by: OBSTETRICS & GYNECOLOGY

## 2017-11-09 PROCEDURE — 63600175 PHARM REV CODE 636 W HCPCS: Performed by: NURSE ANESTHETIST, CERTIFIED REGISTERED

## 2017-11-09 PROCEDURE — P9021 RED BLOOD CELLS UNIT: HCPCS

## 2017-11-09 PROCEDURE — 88305 TISSUE EXAM BY PATHOLOGIST: CPT | Performed by: PATHOLOGY

## 2017-11-09 PROCEDURE — 58146 MYOMECTOMY ABDOM COMPLEX: CPT | Mod: 82,,, | Performed by: OBSTETRICS & GYNECOLOGY

## 2017-11-09 PROCEDURE — 71000039 HC RECOVERY, EACH ADD'L HOUR: Performed by: OBSTETRICS & GYNECOLOGY

## 2017-11-09 PROCEDURE — 25000003 PHARM REV CODE 250: Performed by: NURSE ANESTHETIST, CERTIFIED REGISTERED

## 2017-11-09 PROCEDURE — 88305 TISSUE EXAM BY PATHOLOGIST: CPT | Mod: 26,,, | Performed by: PATHOLOGY

## 2017-11-09 PROCEDURE — 86920 COMPATIBILITY TEST SPIN: CPT

## 2017-11-09 PROCEDURE — 99900035 HC TECH TIME PER 15 MIN (STAT)

## 2017-11-09 PROCEDURE — 81025 URINE PREGNANCY TEST: CPT | Performed by: OBSTETRICS & GYNECOLOGY

## 2017-11-09 PROCEDURE — 25000003 PHARM REV CODE 250: Performed by: ANESTHESIOLOGY

## 2017-11-09 PROCEDURE — 58146 MYOMECTOMY ABDOM COMPLEX: CPT | Mod: ,,, | Performed by: OBSTETRICS & GYNECOLOGY

## 2017-11-09 PROCEDURE — 85610 PROTHROMBIN TIME: CPT

## 2017-11-09 PROCEDURE — 37000009 HC ANESTHESIA EA ADD 15 MINS: Performed by: OBSTETRICS & GYNECOLOGY

## 2017-11-09 PROCEDURE — 36415 COLL VENOUS BLD VENIPUNCTURE: CPT

## 2017-11-09 PROCEDURE — 85730 THROMBOPLASTIN TIME PARTIAL: CPT

## 2017-11-09 PROCEDURE — S5010 5% DEXTROSE AND 0.45% SALINE: HCPCS | Performed by: OBSTETRICS & GYNECOLOGY

## 2017-11-09 PROCEDURE — 36000706: Performed by: OBSTETRICS & GYNECOLOGY

## 2017-11-09 PROCEDURE — 36000707: Performed by: OBSTETRICS & GYNECOLOGY

## 2017-11-09 PROCEDURE — 11000001 HC ACUTE MED/SURG PRIVATE ROOM

## 2017-11-09 PROCEDURE — 0UB90ZZ EXCISION OF UTERUS, OPEN APPROACH: ICD-10-PCS | Performed by: OBSTETRICS & GYNECOLOGY

## 2017-11-09 RX ORDER — HYDROMORPHONE HYDROCHLORIDE 1 MG/ML
1 INJECTION, SOLUTION INTRAMUSCULAR; INTRAVENOUS; SUBCUTANEOUS EVERY 4 HOURS PRN
Status: DISCONTINUED | OUTPATIENT
Start: 2017-11-09 | End: 2017-11-10

## 2017-11-09 RX ORDER — OXYCODONE HYDROCHLORIDE 5 MG/1
10 TABLET ORAL EVERY 4 HOURS PRN
Status: DISCONTINUED | OUTPATIENT
Start: 2017-11-09 | End: 2017-11-10

## 2017-11-09 RX ORDER — DEXAMETHASONE SODIUM PHOSPHATE 4 MG/ML
INJECTION, SOLUTION INTRA-ARTICULAR; INTRALESIONAL; INTRAMUSCULAR; INTRAVENOUS; SOFT TISSUE
Status: DISCONTINUED | OUTPATIENT
Start: 2017-11-09 | End: 2017-11-09

## 2017-11-09 RX ORDER — PROMETHAZINE HYDROCHLORIDE 25 MG/ML
12.5 INJECTION, SOLUTION INTRAMUSCULAR; INTRAVENOUS EVERY 6 HOURS PRN
Status: DISCONTINUED | OUTPATIENT
Start: 2017-11-09 | End: 2017-11-12 | Stop reason: HOSPADM

## 2017-11-09 RX ORDER — POLYETHYLENE GLYCOL 3350 17 G/17G
17 POWDER, FOR SOLUTION ORAL DAILY
Status: DISCONTINUED | OUTPATIENT
Start: 2017-11-09 | End: 2017-11-12 | Stop reason: HOSPADM

## 2017-11-09 RX ORDER — MEPERIDINE HYDROCHLORIDE 50 MG/ML
12.5 INJECTION INTRAMUSCULAR; INTRAVENOUS; SUBCUTANEOUS ONCE AS NEEDED
Status: COMPLETED | OUTPATIENT
Start: 2017-11-09 | End: 2017-11-09

## 2017-11-09 RX ORDER — ONDANSETRON 2 MG/ML
4 INJECTION INTRAMUSCULAR; INTRAVENOUS DAILY PRN
Status: DISCONTINUED | OUTPATIENT
Start: 2017-11-09 | End: 2017-11-09 | Stop reason: HOSPADM

## 2017-11-09 RX ORDER — ROCURONIUM BROMIDE 10 MG/ML
INJECTION, SOLUTION INTRAVENOUS
Status: DISCONTINUED | OUTPATIENT
Start: 2017-11-09 | End: 2017-11-09

## 2017-11-09 RX ORDER — MUPIROCIN 20 MG/G
OINTMENT TOPICAL
Status: DISCONTINUED | OUTPATIENT
Start: 2017-11-09 | End: 2017-11-09 | Stop reason: HOSPADM

## 2017-11-09 RX ORDER — FAMOTIDINE 20 MG/1
20 TABLET, FILM COATED ORAL 2 TIMES DAILY
Status: DISCONTINUED | OUTPATIENT
Start: 2017-11-09 | End: 2017-11-12 | Stop reason: HOSPADM

## 2017-11-09 RX ORDER — MUPIROCIN 20 MG/G
1 OINTMENT TOPICAL 2 TIMES DAILY
Status: DISCONTINUED | OUTPATIENT
Start: 2017-11-09 | End: 2017-11-12 | Stop reason: HOSPADM

## 2017-11-09 RX ORDER — HYDROMORPHONE HYDROCHLORIDE 2 MG/ML
0.4 INJECTION, SOLUTION INTRAMUSCULAR; INTRAVENOUS; SUBCUTANEOUS EVERY 5 MIN PRN
Status: DISCONTINUED | OUTPATIENT
Start: 2017-11-09 | End: 2017-11-09 | Stop reason: HOSPADM

## 2017-11-09 RX ORDER — PROPOFOL 10 MG/ML
VIAL (ML) INTRAVENOUS
Status: DISCONTINUED | OUTPATIENT
Start: 2017-11-09 | End: 2017-11-09

## 2017-11-09 RX ORDER — ACETAMINOPHEN 10 MG/ML
INJECTION, SOLUTION INTRAVENOUS
Status: DISCONTINUED | OUTPATIENT
Start: 2017-11-09 | End: 2017-11-09

## 2017-11-09 RX ORDER — PANTOPRAZOLE SODIUM 40 MG/10ML
40 INJECTION, POWDER, LYOPHILIZED, FOR SOLUTION INTRAVENOUS
Status: DISCONTINUED | OUTPATIENT
Start: 2017-11-10 | End: 2017-11-12 | Stop reason: HOSPADM

## 2017-11-09 RX ORDER — NEOSTIGMINE METHYLSULFATE 1 MG/ML
INJECTION, SOLUTION INTRAVENOUS
Status: DISCONTINUED | OUTPATIENT
Start: 2017-11-09 | End: 2017-11-09

## 2017-11-09 RX ORDER — ACETAMINOPHEN 10 MG/ML
1000 INJECTION, SOLUTION INTRAVENOUS EVERY 8 HOURS
Status: COMPLETED | OUTPATIENT
Start: 2017-11-09 | End: 2017-11-09

## 2017-11-09 RX ORDER — FENTANYL CITRATE 50 UG/ML
25 INJECTION, SOLUTION INTRAMUSCULAR; INTRAVENOUS EVERY 5 MIN PRN
Status: DISCONTINUED | OUTPATIENT
Start: 2017-11-09 | End: 2017-11-09 | Stop reason: HOSPADM

## 2017-11-09 RX ORDER — PHENYLEPHRINE HYDROCHLORIDE 10 MG/ML
INJECTION INTRAVENOUS
Status: DISCONTINUED | OUTPATIENT
Start: 2017-11-09 | End: 2017-11-09

## 2017-11-09 RX ORDER — OXYCODONE AND ACETAMINOPHEN 5; 325 MG/1; MG/1
2 TABLET ORAL EVERY 4 HOURS PRN
Status: DISCONTINUED | OUTPATIENT
Start: 2017-11-09 | End: 2017-11-09

## 2017-11-09 RX ORDER — DIPHENHYDRAMINE HYDROCHLORIDE 50 MG/ML
25 INJECTION INTRAMUSCULAR; INTRAVENOUS EVERY 4 HOURS PRN
Status: DISCONTINUED | OUTPATIENT
Start: 2017-11-09 | End: 2017-11-12 | Stop reason: HOSPADM

## 2017-11-09 RX ORDER — IBUPROFEN 600 MG/1
600 TABLET ORAL EVERY 6 HOURS PRN
Status: DISCONTINUED | OUTPATIENT
Start: 2017-11-09 | End: 2017-11-10

## 2017-11-09 RX ORDER — ONDANSETRON 4 MG/1
8 TABLET, FILM COATED ORAL EVERY 8 HOURS PRN
Status: DISCONTINUED | OUTPATIENT
Start: 2017-11-09 | End: 2017-11-12 | Stop reason: HOSPADM

## 2017-11-09 RX ORDER — CEFAZOLIN SODIUM 2 G/50ML
2 SOLUTION INTRAVENOUS
Status: COMPLETED | OUTPATIENT
Start: 2017-11-09 | End: 2017-11-09

## 2017-11-09 RX ORDER — BISACODYL 10 MG
10 SUPPOSITORY, RECTAL RECTAL DAILY PRN
Status: DISCONTINUED | OUTPATIENT
Start: 2017-11-09 | End: 2017-11-12 | Stop reason: HOSPADM

## 2017-11-09 RX ORDER — DEXTROSE MONOHYDRATE AND SODIUM CHLORIDE 5; .45 G/100ML; G/100ML
INJECTION, SOLUTION INTRAVENOUS CONTINUOUS
Status: DISCONTINUED | OUTPATIENT
Start: 2017-11-09 | End: 2017-11-12 | Stop reason: HOSPADM

## 2017-11-09 RX ORDER — GLYCOPYRROLATE 0.2 MG/ML
INJECTION INTRAMUSCULAR; INTRAVENOUS
Status: DISCONTINUED | OUTPATIENT
Start: 2017-11-09 | End: 2017-11-09

## 2017-11-09 RX ORDER — SODIUM CHLORIDE 9 MG/ML
INJECTION, SOLUTION INTRAVENOUS CONTINUOUS PRN
Status: DISCONTINUED | OUTPATIENT
Start: 2017-11-09 | End: 2017-11-09

## 2017-11-09 RX ORDER — DIPHENHYDRAMINE HCL 25 MG
25 CAPSULE ORAL EVERY 4 HOURS PRN
Status: DISCONTINUED | OUTPATIENT
Start: 2017-11-09 | End: 2017-11-12 | Stop reason: HOSPADM

## 2017-11-09 RX ORDER — HYDROMORPHONE HYDROCHLORIDE 2 MG/ML
INJECTION, SOLUTION INTRAMUSCULAR; INTRAVENOUS; SUBCUTANEOUS
Status: DISCONTINUED | OUTPATIENT
Start: 2017-11-09 | End: 2017-11-09

## 2017-11-09 RX ORDER — OXYCODONE HYDROCHLORIDE 5 MG/1
5 TABLET ORAL
Status: DISCONTINUED | OUTPATIENT
Start: 2017-11-09 | End: 2017-11-09 | Stop reason: HOSPADM

## 2017-11-09 RX ORDER — LIDOCAINE HCL/PF 100 MG/5ML
SYRINGE (ML) INTRAVENOUS
Status: DISCONTINUED | OUTPATIENT
Start: 2017-11-09 | End: 2017-11-09

## 2017-11-09 RX ORDER — FENTANYL CITRATE 50 UG/ML
INJECTION, SOLUTION INTRAMUSCULAR; INTRAVENOUS
Status: DISCONTINUED | OUTPATIENT
Start: 2017-11-09 | End: 2017-11-09

## 2017-11-09 RX ORDER — SODIUM CHLORIDE, SODIUM LACTATE, POTASSIUM CHLORIDE, CALCIUM CHLORIDE 600; 310; 30; 20 MG/100ML; MG/100ML; MG/100ML; MG/100ML
INJECTION, SOLUTION INTRAVENOUS CONTINUOUS
Status: DISCONTINUED | OUTPATIENT
Start: 2017-11-09 | End: 2017-11-12 | Stop reason: HOSPADM

## 2017-11-09 RX ORDER — AMOXICILLIN 250 MG
1 CAPSULE ORAL 2 TIMES DAILY
Status: DISCONTINUED | OUTPATIENT
Start: 2017-11-09 | End: 2017-11-12 | Stop reason: HOSPADM

## 2017-11-09 RX ORDER — ONDANSETRON 2 MG/ML
INJECTION INTRAMUSCULAR; INTRAVENOUS
Status: DISCONTINUED | OUTPATIENT
Start: 2017-11-09 | End: 2017-11-09

## 2017-11-09 RX ORDER — SODIUM CHLORIDE 0.9 % (FLUSH) 0.9 %
3 SYRINGE (ML) INJECTION
Status: DISCONTINUED | OUTPATIENT
Start: 2017-11-09 | End: 2017-11-12 | Stop reason: HOSPADM

## 2017-11-09 RX ADMIN — OXYCODONE HYDROCHLORIDE 5 MG: 5 TABLET ORAL at 10:11

## 2017-11-09 RX ADMIN — HYDROMORPHONE HYDROCHLORIDE 0.4 MG: 2 INJECTION INTRAMUSCULAR; INTRAVENOUS; SUBCUTANEOUS at 10:11

## 2017-11-09 RX ADMIN — HYDROMORPHONE HYDROCHLORIDE 0.8 MG: 2 INJECTION INTRAMUSCULAR; INTRAVENOUS; SUBCUTANEOUS at 08:11

## 2017-11-09 RX ADMIN — OXYCODONE HYDROCHLORIDE 10 MG: 5 TABLET ORAL at 05:11

## 2017-11-09 RX ADMIN — HYDROMORPHONE HYDROCHLORIDE 0.4 MG: 2 INJECTION INTRAMUSCULAR; INTRAVENOUS; SUBCUTANEOUS at 09:11

## 2017-11-09 RX ADMIN — HYDROMORPHONE HYDROCHLORIDE 1 MG: 1 INJECTION, SOLUTION INTRAMUSCULAR; INTRAVENOUS; SUBCUTANEOUS at 06:11

## 2017-11-09 RX ADMIN — PROMETHAZINE HYDROCHLORIDE 6.25 MG: 25 INJECTION INTRAMUSCULAR; INTRAVENOUS at 10:11

## 2017-11-09 RX ADMIN — LIDOCAINE HYDROCHLORIDE 100 MG: 20 INJECTION, SOLUTION INTRAVENOUS at 07:11

## 2017-11-09 RX ADMIN — ONDANSETRON 4 MG: 2 INJECTION INTRAMUSCULAR; INTRAVENOUS at 08:11

## 2017-11-09 RX ADMIN — VASOPRESSIN 20 UNITS: 20 INJECTION, SOLUTION INTRAMUSCULAR; SUBCUTANEOUS at 07:11

## 2017-11-09 RX ADMIN — STANDARDIZED SENNA CONCENTRATE AND DOCUSATE SODIUM 1 TABLET: 8.6; 5 TABLET, FILM COATED ORAL at 09:11

## 2017-11-09 RX ADMIN — OXYCODONE HYDROCHLORIDE AND ACETAMINOPHEN 2 TABLET: 5; 325 TABLET ORAL at 01:11

## 2017-11-09 RX ADMIN — DEXAMETHASONE SODIUM PHOSPHATE 8 MG: 4 INJECTION, SOLUTION INTRAMUSCULAR; INTRAVENOUS at 08:11

## 2017-11-09 RX ADMIN — ACETAMINOPHEN 1000 MG: 10 INJECTION, SOLUTION INTRAVENOUS at 07:11

## 2017-11-09 RX ADMIN — FENTANYL CITRATE 50 MCG: 50 INJECTION, SOLUTION INTRAMUSCULAR; INTRAVENOUS at 07:11

## 2017-11-09 RX ADMIN — MUPIROCIN 1 G: 20 OINTMENT TOPICAL at 09:11

## 2017-11-09 RX ADMIN — SODIUM CHLORIDE: 0.9 INJECTION, SOLUTION INTRAVENOUS at 08:11

## 2017-11-09 RX ADMIN — ROCURONIUM BROMIDE 10 MG: 10 INJECTION INTRAVENOUS at 07:11

## 2017-11-09 RX ADMIN — SODIUM CHLORIDE, SODIUM LACTATE, POTASSIUM CHLORIDE, AND CALCIUM CHLORIDE: 600; 310; 30; 20 INJECTION, SOLUTION INTRAVENOUS at 06:11

## 2017-11-09 RX ADMIN — FENTANYL CITRATE 200 MCG: 50 INJECTION, SOLUTION INTRAMUSCULAR; INTRAVENOUS at 07:11

## 2017-11-09 RX ADMIN — ONDANSETRON 8 MG: 4 TABLET, FILM COATED ORAL at 05:11

## 2017-11-09 RX ADMIN — OXYCODONE HYDROCHLORIDE 10 MG: 5 TABLET ORAL at 09:11

## 2017-11-09 RX ADMIN — MEPERIDINE HYDROCHLORIDE 12.5 MG: 50 INJECTION INTRAMUSCULAR; INTRAVENOUS; SUBCUTANEOUS at 09:11

## 2017-11-09 RX ADMIN — ROCURONIUM BROMIDE 10 MG: 10 INJECTION INTRAVENOUS at 08:11

## 2017-11-09 RX ADMIN — PHENYLEPHRINE HYDROCHLORIDE 100 MCG: 10 INJECTION INTRAVENOUS at 08:11

## 2017-11-09 RX ADMIN — DEXTROSE AND SODIUM CHLORIDE: 5; .45 INJECTION, SOLUTION INTRAVENOUS at 12:11

## 2017-11-09 RX ADMIN — MUPIROCIN: 20 OINTMENT TOPICAL at 05:11

## 2017-11-09 RX ADMIN — PROMETHAZINE HYDROCHLORIDE 12.5 MG: 25 INJECTION INTRAMUSCULAR; INTRAVENOUS at 02:11

## 2017-11-09 RX ADMIN — ACETAMINOPHEN 1000 MG: 10 INJECTION, SOLUTION INTRAVENOUS at 11:11

## 2017-11-09 RX ADMIN — ACETAMINOPHEN 1000 MG: 10 INJECTION, SOLUTION INTRAVENOUS at 03:11

## 2017-11-09 RX ADMIN — HYDROMORPHONE HYDROCHLORIDE 0.8 MG: 2 INJECTION INTRAMUSCULAR; INTRAVENOUS; SUBCUTANEOUS at 09:11

## 2017-11-09 RX ADMIN — PROPOFOL 200 MG: 10 INJECTION, EMULSION INTRAVENOUS at 07:11

## 2017-11-09 RX ADMIN — SODIUM CHLORIDE, SODIUM LACTATE, POTASSIUM CHLORIDE, AND CALCIUM CHLORIDE: 600; 310; 30; 20 INJECTION, SOLUTION INTRAVENOUS at 07:11

## 2017-11-09 RX ADMIN — ROCURONIUM BROMIDE 40 MG: 10 INJECTION INTRAVENOUS at 07:11

## 2017-11-09 RX ADMIN — GLYCOPYRROLATE 0.4 MG: 0.2 INJECTION, SOLUTION INTRAMUSCULAR; INTRAVENOUS at 09:11

## 2017-11-09 RX ADMIN — FAMOTIDINE 20 MG: 20 TABLET, FILM COATED ORAL at 09:11

## 2017-11-09 RX ADMIN — HYDROMORPHONE HYDROCHLORIDE 1 MG: 1 INJECTION, SOLUTION INTRAMUSCULAR; INTRAVENOUS; SUBCUTANEOUS at 11:11

## 2017-11-09 RX ADMIN — CEFAZOLIN SODIUM 2 G: 2 SOLUTION INTRAVENOUS at 07:11

## 2017-11-09 RX ADMIN — HYDROMORPHONE HYDROCHLORIDE 1 MG: 1 INJECTION, SOLUTION INTRAMUSCULAR; INTRAVENOUS; SUBCUTANEOUS at 02:11

## 2017-11-09 RX ADMIN — NEOSTIGMINE METHYLSULFATE 3 MG: 1 INJECTION INTRAVENOUS at 09:11

## 2017-11-09 NOTE — OPERATIVE NOTE ADDENDUM
Certification of Assistant at Surgery       Surgery Date: 11/9/2017     Participating Surgeons:  Surgeon(s) and Role:     * Mohan Pastrana MD - Primary     * Gertrudis Reyes MD - Assisting    Procedures:  Procedure(s) (LRB):  MYOMECTOMY- OPEN (N/A)    Assistant Surgeon's Certification of Necessity:  I understand that section 1842 (b) (6) (d) of the Social Security Act generally prohibits Medicare Part B reasonable charge payment for the services of assistants at surgery in teaching hospitals when qualified residents are available to furnish such services. I certify that the services for which payment is claimed were medically necessary, and that no qualified resident was available to perform the services. I further understand that these services are subject to post-payment review by the Medicare carrier.      Gertrudis Reyes MD    11/09/2017  12:24 PM

## 2017-11-09 NOTE — NURSING
Pt arrived to floor via stretcher with ESTELA Whitaker and transferred self to bed. VS taken and stable on RA and afebrile. IVF started, SCDs applied, oriented to room, call light placed within reach, bed low and locked, and family at bedside. Pt complains of pain 4 /10. Kunz noted draining clear yellow urine to gravity. Incisions noted to Lower abdomen, CDI. No acute distress noted at this time. Will continue to monitor.

## 2017-11-09 NOTE — ANESTHESIA POSTPROCEDURE EVALUATION
"Anesthesia Post Evaluation    Patient: Olga Prater    Procedure(s) Performed: Procedure(s) (LRB):  MYOMECTOMY- OPEN (N/A)    Final Anesthesia Type: general  Patient location during evaluation: PACU  Patient participation: Yes- Able to Participate  Level of consciousness: awake and alert  Post-procedure vital signs: reviewed and stable  Pain management: adequate  Airway patency: patent  PONV status at discharge: No PONV  Anesthetic complications: no      Cardiovascular status: blood pressure returned to baseline  Respiratory status: unassisted  Hydration status: euvolemic  Follow-up not needed.        Visit Vitals  /62   Pulse 82   Temp 36.6 °C (97.8 °F) (Oral)   Resp 18   Ht 5' 1" (1.549 m)   Wt 59 kg (130 lb)   LMP 10/19/2017 (Approximate)   SpO2 99%   Breastfeeding? No   BMI 24.56 kg/m²       Pain/Nidia Score: Pain Assessment Performed: Yes (11/9/2017  9:43 AM)  Presence of Pain: complains of pain/discomfort (11/9/2017  9:43 AM)  Pain Rating Prior to Med Admin: 6 (11/9/2017 10:21 AM)  Nidia Score: 9 (11/9/2017 10:15 AM)      "

## 2017-11-09 NOTE — NURSING
Patient complaining of excruciated pain and nausea. Spoke with Dr Reyes; new orders received and carried out.

## 2017-11-09 NOTE — PLAN OF CARE
CM met with patient &  at bedside to complete discharge assessment. No anticipated DC needs      11/09/17 1424   Discharge Assessment   Assessment Type Discharge Planning Assessment   Confirmed/corrected address and phone number on facesheet? Yes   Assessment information obtained from? Patient;Caregiver   Communicated expected length of stay with patient/caregiver no   Prior to hospitilization cognitive status: Alert/Oriented   Prior to hospitalization functional status: Independent   Current cognitive status: Alert/Oriented   Current Functional Status: Independent   Lives With spouse;child(sherry), dependent   Able to Return to Prior Arrangements yes   Is patient able to care for self after discharge? Yes   Patient's perception of discharge disposition home or selfcare   Readmission Within The Last 30 Days no previous admission in last 30 days   Patient currently being followed by outpatient case management? No   Patient currently receives any other outside agency services? No   Equipment Currently Used at Home none   Do you have any problems affording any of your prescribed medications? No   Is the patient taking medications as prescribed? yes   Does the patient have transportation home? Yes   Does the patient receive services at the Coumadin Clinic? No   Discharge Plan A Home with family   Patient/Family In Agreement With Plan yes

## 2017-11-09 NOTE — TRANSFER OF CARE
"Anesthesia Transfer of Care Note    Patient: Olga Prater    Procedure(s) Performed: Procedure(s) (LRB):  MYOMECTOMY- OPEN (N/A)    Patient location: PACU    Anesthesia Type: general    Transport from OR: Transported from OR on 2-3 L/min O2 by NC with adequate spontaneous ventilation    Post pain: pain needs to be addressed    Post assessment: no apparent anesthetic complications    Post vital signs: stable    Level of consciousness: awake, alert and oriented    Nausea/Vomiting: no nausea/vomiting    Complications: none    Transfer of care protocol was followed      Last vitals:   Visit Vitals  /81 (BP Location: Left arm, Patient Position: Sitting)   Pulse 83   Temp 36.9 °C (98.5 °F) (Oral)   Resp 16   Ht 5' 1" (1.549 m)   Wt 59 kg (130 lb)   LMP 10/19/2017 (Approximate)   SpO2 100%   Breastfeeding? No   BMI 24.56 kg/m²     "

## 2017-11-10 PROBLEM — Z98.890 STATUS POST MYOMECTOMY: Status: ACTIVE | Noted: 2017-11-10

## 2017-11-10 PROBLEM — O14.10 SEVERE PREECLAMPSIA: Status: RESOLVED | Noted: 2017-08-07 | Resolved: 2017-11-10

## 2017-11-10 LAB
BASOPHILS # BLD AUTO: 0.01 K/UL
BASOPHILS NFR BLD: 0.1 %
DIFFERENTIAL METHOD: ABNORMAL
EOSINOPHIL # BLD AUTO: 0 K/UL
EOSINOPHIL NFR BLD: 0 %
ERYTHROCYTE [DISTWIDTH] IN BLOOD BY AUTOMATED COUNT: 14.5 %
HCT VFR BLD AUTO: 30.5 %
HGB BLD-MCNC: 9.9 G/DL
LYMPHOCYTES # BLD AUTO: 1.4 K/UL
LYMPHOCYTES NFR BLD: 12.7 %
MCH RBC QN AUTO: 26.3 PG
MCHC RBC AUTO-ENTMCNC: 32.5 G/DL
MCV RBC AUTO: 81 FL
MONOCYTES # BLD AUTO: 0.9 K/UL
MONOCYTES NFR BLD: 8.3 %
NEUTROPHILS # BLD AUTO: 8.3 K/UL
NEUTROPHILS NFR BLD: 78.7 %
PLATELET # BLD AUTO: 174 K/UL
PMV BLD AUTO: 10.7 FL
RBC # BLD AUTO: 3.76 M/UL
WBC # BLD AUTO: 10.59 K/UL

## 2017-11-10 PROCEDURE — 94799 UNLISTED PULMONARY SVC/PX: CPT

## 2017-11-10 PROCEDURE — 63600175 PHARM REV CODE 636 W HCPCS: Performed by: OBSTETRICS & GYNECOLOGY

## 2017-11-10 PROCEDURE — 94761 N-INVAS EAR/PLS OXIMETRY MLT: CPT

## 2017-11-10 PROCEDURE — 99024 POSTOP FOLLOW-UP VISIT: CPT | Mod: ,,, | Performed by: OBSTETRICS & GYNECOLOGY

## 2017-11-10 PROCEDURE — 25000003 PHARM REV CODE 250: Performed by: OBSTETRICS & GYNECOLOGY

## 2017-11-10 PROCEDURE — 11000001 HC ACUTE MED/SURG PRIVATE ROOM

## 2017-11-10 PROCEDURE — C9113 INJ PANTOPRAZOLE SODIUM, VIA: HCPCS | Performed by: OBSTETRICS & GYNECOLOGY

## 2017-11-10 PROCEDURE — 36415 COLL VENOUS BLD VENIPUNCTURE: CPT

## 2017-11-10 PROCEDURE — 85025 COMPLETE CBC W/AUTO DIFF WBC: CPT

## 2017-11-10 RX ORDER — HYDROMORPHONE HYDROCHLORIDE 1 MG/ML
1 INJECTION, SOLUTION INTRAMUSCULAR; INTRAVENOUS; SUBCUTANEOUS ONCE
Status: DISCONTINUED | OUTPATIENT
Start: 2017-11-10 | End: 2017-11-10

## 2017-11-10 RX ORDER — IBUPROFEN 600 MG/1
600 TABLET ORAL EVERY 6 HOURS
Status: DISCONTINUED | OUTPATIENT
Start: 2017-11-10 | End: 2017-11-12 | Stop reason: HOSPADM

## 2017-11-10 RX ORDER — HYDROMORPHONE HYDROCHLORIDE 1 MG/ML
1 INJECTION, SOLUTION INTRAMUSCULAR; INTRAVENOUS; SUBCUTANEOUS ONCE
Status: COMPLETED | OUTPATIENT
Start: 2017-11-10 | End: 2017-11-10

## 2017-11-10 RX ORDER — SIMETHICONE 80 MG
1 TABLET,CHEWABLE ORAL 3 TIMES DAILY PRN
Status: DISCONTINUED | OUTPATIENT
Start: 2017-11-10 | End: 2017-11-12 | Stop reason: HOSPADM

## 2017-11-10 RX ORDER — HYDROMORPHONE HYDROCHLORIDE 2 MG/1
2 TABLET ORAL
Status: DISCONTINUED | OUTPATIENT
Start: 2017-11-10 | End: 2017-11-12 | Stop reason: HOSPADM

## 2017-11-10 RX ADMIN — HYDROMORPHONE HYDROCHLORIDE 1 MG: 1 INJECTION, SOLUTION INTRAMUSCULAR; INTRAVENOUS; SUBCUTANEOUS at 04:11

## 2017-11-10 RX ADMIN — POLYETHYLENE GLYCOL 3350 17 G: 17 POWDER, FOR SOLUTION ORAL at 08:11

## 2017-11-10 RX ADMIN — FAMOTIDINE 20 MG: 20 TABLET, FILM COATED ORAL at 08:11

## 2017-11-10 RX ADMIN — IBUPROFEN 600 MG: 600 TABLET, FILM COATED ORAL at 05:11

## 2017-11-10 RX ADMIN — MUPIROCIN 1 G: 20 OINTMENT TOPICAL at 08:11

## 2017-11-10 RX ADMIN — HYDROMORPHONE HYDROCHLORIDE 1 MG: 1 INJECTION, SOLUTION INTRAMUSCULAR; INTRAVENOUS; SUBCUTANEOUS at 08:11

## 2017-11-10 RX ADMIN — PANTOPRAZOLE SODIUM 40 MG: 40 INJECTION, POWDER, FOR SOLUTION INTRAVENOUS at 05:11

## 2017-11-10 RX ADMIN — HYDROMORPHONE HYDROCHLORIDE 2 MG: 2 TABLET ORAL at 08:11

## 2017-11-10 RX ADMIN — ONDANSETRON 8 MG: 4 TABLET, FILM COATED ORAL at 11:11

## 2017-11-10 RX ADMIN — HYDROMORPHONE HYDROCHLORIDE 2 MG: 2 TABLET ORAL at 05:11

## 2017-11-10 RX ADMIN — OXYCODONE HYDROCHLORIDE 10 MG: 5 TABLET ORAL at 02:11

## 2017-11-10 RX ADMIN — HYDROMORPHONE HYDROCHLORIDE 1 MG: 1 INJECTION, SOLUTION INTRAMUSCULAR; INTRAVENOUS; SUBCUTANEOUS at 06:11

## 2017-11-10 RX ADMIN — HYDROMORPHONE HYDROCHLORIDE 1 MG: 1 INJECTION, SOLUTION INTRAMUSCULAR; INTRAVENOUS; SUBCUTANEOUS at 11:11

## 2017-11-10 RX ADMIN — OXYCODONE HYDROCHLORIDE 10 MG: 5 TABLET ORAL at 10:11

## 2017-11-10 RX ADMIN — STANDARDIZED SENNA CONCENTRATE AND DOCUSATE SODIUM 1 TABLET: 8.6; 5 TABLET, FILM COATED ORAL at 08:11

## 2017-11-10 RX ADMIN — OXYCODONE HYDROCHLORIDE 10 MG: 5 TABLET ORAL at 05:11

## 2017-11-10 NOTE — PROGRESS NOTES
[facility]  Obstetrics & Gynecology  Progress Note    Patient Name: Olga Prater  MRN: [id number]  Admission Date: 11/9/2017  Primary Care Provider: Primary Doctor No  Principal Problem: Status post myomectomy    Subjective:     HPI:  32 year old s/p open myomectomy/2U PRBCs    Interval History: IV dilaudid improves pain    Scheduled Meds:   famotidine  20 mg Oral BID    mupirocin  1 g Nasal BID    pantoprazole  40 mg Intravenous Before breakfast    polyethylene glycol  17 g Oral Daily    senna-docusate 8.6-50 mg  1 tablet Oral BID     Continuous Infusions:   dextrose 5 % and 0.45 % NaCl Stopped (11/10/17 0600)    lactated ringers       PRN Meds:bisacodyl, diphenhydrAMINE, diphenhydrAMINE, HYDROmorphone, ibuprofen, ondansetron, oxyCODONE, promethazine, sodium chloride 0.9%    Review of patient's allergies indicates:  No Known Allergies    Objective:     Vital Signs (Most Recent):  Temp: 99 °F (37.2 °C) (11/10/17 1609)  Pulse: (!) 114 (11/10/17 1609)  Resp: 18 (11/10/17 1609)  BP: 139/80 (11/10/17 1609)  SpO2: 99 % (11/10/17 1609) Vital Signs (24h Range):  Temp:  [98 °F (36.7 °C)-99 °F (37.2 °C)] 99 °F (37.2 °C)  Pulse:  [] 114  Resp:  [18-20] 18  SpO2:  [96 %-99 %] 99 %  BP: (108-139)/(64-84) 139/80     Weight: 59 kg (129 lb 15.7 oz)  Body mass index is 24.56 kg/m².  Patient's last menstrual period was 10/19/2017 (approximate).    I&O (Last 24H):      Intake/Output Summary (Last 24 hours) at 11/10/17 1709  Last data filed at 11/10/17 0600    <table CELLSPACING=0 CELLPADDING=0 BORDER=1>  <tr><td YDVWF=830%></td>  <td QCGNL=617%><b>Gross per 24 hour</b></td></tr>  <tr><td CTOTJ=440%>Intake</td>  <td JYBFH=172%>          2957.92 ml</td></tr>  <tr><td MQBKB=118%>Output</td>  <td ZSLIS=910%>             2935 ml</td></tr>  <tr><td AKVVO=653%><b>Net</b></td>  <td WSCWH=255%>            22.92 ml</td></tr>  </table>      Laboratory:  CBC:   Recent Labs  Lab 11/10/17  0355   WBC 10.59    RBC 3.76*   HGB 9.9*   HCT 30.5*      MCV 81*   MCH 26.3*   MCHC 32.5     I have personallly reviewed all pertinent lab results from the last 24 hours.    Diagnostic Results:  I have personally reviewed all diagnostic results within the last 24 hours.    Physical Exam:   Constitutional: She is oriented to person, place, and time. She appears well-developed and well-nourished. She appears distressed.       Cardiovascular:   Mild tachy    Pulmonary/Chest: Effort normal.        Abdominal: Soft. She exhibits abdominal incision. She exhibits no distension. There is tenderness. There is no rebound and no guarding.   Dressing intact, no active drainage noted, small 1 cm area of old blood noted, no change per RN             Musculoskeletal: She exhibits no edema or tenderness.       Neurological: She is alert and oriented to person, place, and time.    Skin: Skin is warm and dry.    Psychiatric: She has a normal mood and affect.       Assessment/Plan:     * Status post myomectomy    Pain management is currently not adequate.  Will add Motrin, discontinue percocet, and change IV dilaudid to po dilaudid.  Will order simethicone, encouraged ambulation with abdominal binder.  Patient agreeable, questions answered.             Deloris Gruber MD  Obstetrics & Gynecology  [facility]

## 2017-11-10 NOTE — PROGRESS NOTES
[facility]  Obstetrics & Gynecology  Progress Note    Patient Name: Olga Prater  MRN: [id number]  Admission Date: 11/9/2017  Primary Care Provider: Primary Doctor No  Principal Problem: Status post myomectomy    Subjective:     HPI:  32 year old s/p open myomectomy/2U PRBCs    No new subjective & objective note has been filed under this hospital service since the last note was generated.    Assessment/Plan:     * Status post myomectomy    Pain management is currently not adequate.  Will add Motrin, discontinue percocet, and change IV dilaudid to po dilaudid.  Will order simethicone, encouraged ambulation with abdominal binder.  Patient agreeable, questions answered.             Deloris Gruber MD  Obstetrics & Gynecology  [facility]

## 2017-11-10 NOTE — PLAN OF CARE
Problem: Patient Care Overview  Goal: Plan of Care Review  Outcome: Ongoing (interventions implemented as appropriate)  Remains free from fall, injury, and skin breakdown. Kunz care performed; good urine output noted. VS stable on RA and afebrile. Positions self independently.  Pain mildly controlled with PO/IV PRN meds. Dressing to abdomen, CDI.  TEDs/SCDs maintained. Medicated for nauseax2; mild relief obtain. IV site WNL. Plan of care reviewed with patient and all questions answered. Bed low, locked w/ bed alarm on. Call light within reach. Purposeful rounding performed. No other complaints at this time.

## 2017-11-10 NOTE — SUBJECTIVE & OBJECTIVE
Interval History: IV dilaudid improves pain    Scheduled Meds:   famotidine  20 mg Oral BID    mupirocin  1 g Nasal BID    pantoprazole  40 mg Intravenous Before breakfast    polyethylene glycol  17 g Oral Daily    senna-docusate 8.6-50 mg  1 tablet Oral BID     Continuous Infusions:   dextrose 5 % and 0.45 % NaCl Stopped (11/10/17 0600)    lactated ringers       PRN Meds:bisacodyl, diphenhydrAMINE, diphenhydrAMINE, HYDROmorphone, ibuprofen, ondansetron, oxyCODONE, promethazine, sodium chloride 0.9%    Review of patient's allergies indicates:  No Known Allergies    Objective:     Vital Signs (Most Recent):  Temp: 99 °F (37.2 °C) (11/10/17 1609)  Pulse: (!) 114 (11/10/17 1609)  Resp: 18 (11/10/17 1609)  BP: 139/80 (11/10/17 1609)  SpO2: 99 % (11/10/17 1609) Vital Signs (24h Range):  Temp:  [98 °F (36.7 °C)-99 °F (37.2 °C)] 99 °F (37.2 °C)  Pulse:  [] 114  Resp:  [18-20] 18  SpO2:  [96 %-99 %] 99 %  BP: (108-139)/(64-84) 139/80     Weight: 59 kg (129 lb 15.7 oz)  Body mass index is 24.56 kg/m².  Patient's last menstrual period was 10/19/2017 (approximate).    I&O (Last 24H):      Intake/Output Summary (Last 24 hours) at 11/10/17 1709  Last data filed at 11/10/17 0600    <table CELLSPACING=0 CELLPADDING=0 BORDER=1>  <tr><td GEPMJ=575%></td>  <td ERQHL=902%><b>Gross per 24 hour</b></td></tr>  <tr><td QOJYL=855%>Intake</td>  <td NVQFI=131%>          2957.92 ml</td></tr>  <tr><td PAYXN=913%>Output</td>  <td WALQA=736%>             2935 ml</td></tr>  <tr><td NIOEJ=999%><b>Net</b></td>  <td DSTTW=663%>            22.92 ml</td></tr>  </table>      Laboratory:  CBC:   Recent Labs  Lab 11/10/17  0355   WBC 10.59   RBC 3.76*   HGB 9.9*   HCT 30.5*      MCV 81*   MCH 26.3*   MCHC 32.5     I have personallly reviewed all pertinent lab results from the last 24 hours.    Diagnostic Results:  I have personally reviewed all diagnostic results within the last 24 hours.    Physical Exam:   Constitutional: She is  oriented to person, place, and time. She appears well-developed and well-nourished. She appears distressed.       Cardiovascular:   Mild tachy    Pulmonary/Chest: Effort normal.        Abdominal: Soft. She exhibits abdominal incision. She exhibits no distension. There is tenderness. There is no rebound and no guarding.   Dressing intact, no active drainage noted, small 1 cm area of old blood noted, no change per RN             Musculoskeletal: She exhibits no edema or tenderness.       Neurological: She is alert and oriented to person, place, and time.    Skin: Skin is warm and dry.    Psychiatric: She has a normal mood and affect.

## 2017-11-10 NOTE — OP NOTE
DATE OF PROCEDURE:  11/09/2017.    PREOPERATIVE DIAGNOSES:  1.  Menorrhagia.  2.  Anemia.  3.  Uterine fibroids.    POSTOPERATIVE DIAGNOSES:  1.  Menorrhagia.  2.  Anemia.  3.  Uterine fibroids.    PROCEDURE:  Open myomectomy x3.    SURGEON:  Mohan Pastrana M.D.    FIRST ASSISTANT:  Dr. Gertrudis Reyes.    No qualified resident was available for the procedure    ESTIMATED BLOOD LOSS:  1200 mL.    IV FLUIDS:  1200 mL of LR with transfusion of 2 units of packed red blood cells   intraoperative.    URINE OUTPUT:  600 mL of clear urine.    ANESTHESIA:  General.    COMPLICATIONS:  None.    INDICATIONS:  The patient is a 32-year-old para 1, who is three months out   postdelivery from a pregnancy that was complicated by her uterine fibroids.  The   patient was attempting to await further out from delivery to proceed with   myomectomy; however, the patient started with heavy flooding in vaginal bleeding   despite being on her medical management.  Therefore, the patient decided to   proceed with myomectomy.  The patient understood the risks, benefits, and   alternatives and consented to the procedure.    PROCEDURE IN DETAIL:  The patient was taken to the operating room where general   anesthesia was found to be adequate.  She was prepped and draped in the normal   sterile fashion in the supine position.  Her Pfannenstiel skin incision was   excised and discarded.  Incision was carried down to underlying layer of the   fascia.  The fascia was incised and the midline was dissected laterally both   sides.  The inferior aspect of the fascial incision was identified, tented up,   and dissected off the underlying layer of the rectus.  Superior aspect of the   fascial incision was identified, tented up, and dissected off as well.  Muscles   were  in the midline.  The peritoneum was entered bluntly.  Dissection   was continued down anteriorly with sharp and blunt dissection to open up the   peritoneal hole.  The uterus was then  exteriorized from the abdomen.  The   patient had normal tubes and ovaries, bilateral.  She had a 3 x 4 cm   pedunculated anterior fibroid.  She also had a 6 x 11 cm posterior fibroid that   was within the myometrium and a 1 x 1 cm subserosal fibroid that was in the left   mid posterior body of the uterus.  Attention was turned to the pedunculated   fibroid at the stalk.  Two free ties were used to ligate the stalk.  It was then   amputated off with the Bovie.  Excellent hemostasis was noted.  Attention was   then turned to the large posterior fibroid, staying midline aspect of the uterus   between the cornua.  A solution of 20 mL of Pitressin mixed in 100 mL of normal   saline was injected subserosally from the midline fundus extending down   posteriorly aspect of the uterus.  The incision was then made with the Bovie   along this line getting down to the capsule of the fibroid.  Then, with sharp   and blunt dissection, the fibroid was able to be excised without any difficulty.    As the fibroid was being dissected out, it was abutting the endometrium, which   caused a small laceration in the endometrium measuring about 2.5 cm.    Otherwise, the endometrium was healthy in appearance.  Once the fibroid was   removed completely, the endometrium was reapproximated using a running stitch of   4-0 Monocryl.  Then starting from the base of the myometrium of where the   fibroid was excised, the myometrium was rebuilt and reapproximated with running   locked stitch of 2-0 Vicryl.  This was continued along the myometrium until we   got to the serosa of which using 3-0 Vicryl, the serosa was reapproximated with   a baseball-type stitch.  Attention was then turned to the small 1 x 1 cm fibroid   that was able to be excised without any difficulty with the Bovie.  The serosa   was oversewn with a running stitch of 3-0 Vicryl.  Excellent hemostasis was   noted.  Attention was turned to the anterior aspect of the uterus where the    pedunculated fibroid was removed.  There was noted to be bleeding and the stump   was tucked in and sewn into the serosa to create excellent hemostasis.    Revisualization of the closure of the larger fibroid showed two little areas of   bleeding that was made hemostatic with 2 figure-of-eight using the 3-0 Vicryl.    The abdomen was copiously irrigated of all clots.  Leona was used   prophylactically along the uterus where the myomectomies were performed.  The   uterus, tubes and ovaries were then returned to the abdomen.  The peritoneum was   then closed with a running stitch of 2-0 Vicryl reapproximating the muscles   with the closure.  The subfascial tissue was then copiously irrigated.  All   bleeders were made hemostatic with the Bovie.  Leona was also used here   prophylactically.  There were 2 areas of small buttonholes on the anterior   aspect of the fascia that were reapproximated with 2 figure-of-eight of 0   Vicryl.  Once those two buttonholes were sealed, then we closed the fascia with   a running stitch of #1 Vicryl.  Subcutaneous tissue was copiously irrigated.    The subcutaneous tissue was reapproximated with three interrupteds of 2-0 plain   gut.  The skin was closed with running stitch of 4-0 Monocryl.  Sponge, lap, and   needle counts correct x2.  The patient was taken to recovery in stable   condition.      HEBER  dd: 11/10/2017 09:19:00 (CST)  td: 11/10/2017 10:10:45 (CST)  Doc ID   #[id number]  Job ID #[id number]    CC:

## 2017-11-10 NOTE — ASSESSMENT & PLAN NOTE
Pain management is currently not adequate.  Will add Motrin, discontinue percocet, and change IV dilaudid to po dilaudid.  Will order simethicone, encouraged ambulation with abdominal binder.  Patient agreeable, questions answered.

## 2017-11-10 NOTE — NURSING
Bedside report found patient sobbing crying. Pain meds given within the last hour. Patient extremely anxious about urinating and pain control.

## 2017-11-10 NOTE — HOSPITAL COURSE
POD#1: complaint of incisional pain and abdominal pain, percocet not helping. Tolerating clears, no vomiting, some nausea.  Kunz replaced due to urinary retention. Has been up twice today. No flatus, burping.   POD#2: patient complains of continued incisional pain and burning, slightly better than yesterday, using Dilaudid for pain control. She continues to have nausea and has not yet tolerated regular diet. She just passed flatus, has not had a bowel movement.  POD#3: patient doing well this morning, pain controlled with dilaudid and motrin.  Eating/ambulation/voiding without difficulty.

## 2017-11-10 NOTE — NURSING
Patient woke during vital signs and began crying and sobbing. Called MD to obtain orders form pain control. One time dose ordered to regain control of patient's pain. Clarified plan of care to include ambulation, showering, and activity as tolerated.

## 2017-11-10 NOTE — NURSING
Spoke to MD regarding possible urine retention. Obtained order for in/out catheter vs. Sahni, if 800cc leave sahni in place. Patient to attempt to void in bedside commode once more.

## 2017-11-11 PROCEDURE — 94799 UNLISTED PULMONARY SVC/PX: CPT

## 2017-11-11 PROCEDURE — 11000001 HC ACUTE MED/SURG PRIVATE ROOM

## 2017-11-11 PROCEDURE — 25000003 PHARM REV CODE 250: Performed by: OBSTETRICS & GYNECOLOGY

## 2017-11-11 PROCEDURE — 94761 N-INVAS EAR/PLS OXIMETRY MLT: CPT

## 2017-11-11 PROCEDURE — C9113 INJ PANTOPRAZOLE SODIUM, VIA: HCPCS | Performed by: OBSTETRICS & GYNECOLOGY

## 2017-11-11 PROCEDURE — 63600175 PHARM REV CODE 636 W HCPCS: Performed by: OBSTETRICS & GYNECOLOGY

## 2017-11-11 RX ADMIN — FAMOTIDINE 20 MG: 20 TABLET, FILM COATED ORAL at 09:11

## 2017-11-11 RX ADMIN — MUPIROCIN 1 G: 20 OINTMENT TOPICAL at 09:11

## 2017-11-11 RX ADMIN — HYDROMORPHONE HYDROCHLORIDE 2 MG: 2 TABLET ORAL at 11:11

## 2017-11-11 RX ADMIN — ONDANSETRON 8 MG: 4 TABLET, FILM COATED ORAL at 07:11

## 2017-11-11 RX ADMIN — STANDARDIZED SENNA CONCENTRATE AND DOCUSATE SODIUM 1 TABLET: 8.6; 5 TABLET, FILM COATED ORAL at 09:11

## 2017-11-11 RX ADMIN — HYDROMORPHONE HYDROCHLORIDE 2 MG: 2 TABLET ORAL at 06:11

## 2017-11-11 RX ADMIN — HYDROMORPHONE HYDROCHLORIDE 2 MG: 2 TABLET ORAL at 12:11

## 2017-11-11 RX ADMIN — POLYETHYLENE GLYCOL 3350 17 G: 17 POWDER, FOR SOLUTION ORAL at 09:11

## 2017-11-11 RX ADMIN — PANTOPRAZOLE SODIUM 40 MG: 40 INJECTION, POWDER, FOR SOLUTION INTRAVENOUS at 06:11

## 2017-11-11 RX ADMIN — HYDROMORPHONE HYDROCHLORIDE 2 MG: 2 TABLET ORAL at 08:11

## 2017-11-11 RX ADMIN — STANDARDIZED SENNA CONCENTRATE AND DOCUSATE SODIUM 1 TABLET: 8.6; 5 TABLET, FILM COATED ORAL at 08:11

## 2017-11-11 RX ADMIN — IBUPROFEN 600 MG: 600 TABLET, FILM COATED ORAL at 12:11

## 2017-11-11 RX ADMIN — HYDROMORPHONE HYDROCHLORIDE 2 MG: 2 TABLET ORAL at 05:11

## 2017-11-11 RX ADMIN — HYDROMORPHONE HYDROCHLORIDE 2 MG: 2 TABLET ORAL at 03:11

## 2017-11-11 RX ADMIN — IBUPROFEN 600 MG: 600 TABLET, FILM COATED ORAL at 11:11

## 2017-11-11 RX ADMIN — IBUPROFEN 600 MG: 600 TABLET, FILM COATED ORAL at 05:11

## 2017-11-11 RX ADMIN — FAMOTIDINE 20 MG: 20 TABLET, FILM COATED ORAL at 08:11

## 2017-11-11 RX ADMIN — HYDROMORPHONE HYDROCHLORIDE 2 MG: 2 TABLET ORAL at 09:11

## 2017-11-11 RX ADMIN — IBUPROFEN 600 MG: 600 TABLET, FILM COATED ORAL at 06:11

## 2017-11-11 NOTE — PROGRESS NOTES
[facility]  Obstetrics & Gynecology  Progress Note    Patient Name: Olga Prater  MRN: [id number]  Admission Date: 11/9/2017  Primary Care Provider: Primary Doctor No  Principal Problem: Status post myomectomy    Subjective:     HPI:  32 year old s/p open myomectomy/2U PRBCs    Interval History:     Scheduled Meds:   famotidine  20 mg Oral BID    HYDROmorphone  2 mg Oral Q3H    ibuprofen  600 mg Oral Q6H    mupirocin  1 g Nasal BID    pantoprazole  40 mg Intravenous Before breakfast    polyethylene glycol  17 g Oral Daily    senna-docusate 8.6-50 mg  1 tablet Oral BID     Continuous Infusions:   dextrose 5 % and 0.45 % NaCl Stopped (11/10/17 0600)    lactated ringers       PRN Meds:bisacodyl, diphenhydrAMINE, diphenhydrAMINE, ondansetron, promethazine, simethicone, sodium chloride 0.9%    Review of patient's allergies indicates:  No Known Allergies    Objective:     Vital Signs (Most Recent):  Temp: 99 °F (37.2 °C) (11/11/17 0450)  Pulse: 76 (11/11/17 0450)  Resp: 18 (11/10/17 2201)  BP: (!) 106/59 (11/11/17 0450)  SpO2: 95 % (11/11/17 0450) Vital Signs (24h Range):  Temp:  [98.6 °F (37 °C)-99 °F (37.2 °C)] 99 °F (37.2 °C)  Pulse:  [] 76  Resp:  [18] 18  SpO2:  [95 %-99 %] 95 %  BP: (106-139)/(59-80) 106/59     Weight: 59 kg (129 lb 15.7 oz)  Body mass index is 24.56 kg/m².  Patient's last menstrual period was 10/19/2017 (approximate).    I&O (Last 24H):      Intake/Output Summary (Last 24 hours) at 11/11/17 1031  Last data filed at 11/11/17 0600      Laboratory:      Diagnostic Results:      Physical Exam:   Constitutional: She is oriented to person, place, and time. She appears well-developed and well-nourished.       Cardiovascular: Normal rate.     Pulmonary/Chest: Effort normal.        Abdominal: Soft. She exhibits abdominal incision (Clean, dry and intact). Tenderness: Mild incisional tenderness, no fundal tenderness.     Genitourinary: Uterus normal.            Musculoskeletal: She exhibits edema (1+ edema). She exhibits no tenderness.       Neurological: She is alert and oriented to person, place, and time.     Psychiatric: She has a normal mood and affect.       Assessment/Plan:     * Status post myomectomy    Pain management is improved but patient remains unable to tolerate diet. Ambulation encouraged, plan discharge in am.         Menorrhagia    Patient reports no vaginal bleeding at this time.            Jigna Leal MD  Obstetrics & Gynecology  [facility]

## 2017-11-11 NOTE — PLAN OF CARE
"Problem: Patient Care Overview  Goal: Plan of Care Review  No significant events this shift. Free from falls, skin breakdown or injury. Kunz d/c'd this am, pt able to void at will. PIV removed, pt stated "it hurts". Gave prn Zofran for nausea, effective. Able to ambulate around the room, denies needs. Family at bedside. Bed in lowest position, side rails up x2, cb I nreach.      "

## 2017-11-11 NOTE — ASSESSMENT & PLAN NOTE
Pain management is improved but patient remains unable to tolerate diet. Ambulation encouraged, plan discharge in am.

## 2017-11-11 NOTE — SUBJECTIVE & OBJECTIVE
Interval History:     Scheduled Meds:   famotidine  20 mg Oral BID    HYDROmorphone  2 mg Oral Q3H    ibuprofen  600 mg Oral Q6H    mupirocin  1 g Nasal BID    pantoprazole  40 mg Intravenous Before breakfast    polyethylene glycol  17 g Oral Daily    senna-docusate 8.6-50 mg  1 tablet Oral BID     Continuous Infusions:   dextrose 5 % and 0.45 % NaCl Stopped (11/10/17 0600)    lactated ringers       PRN Meds:bisacodyl, diphenhydrAMINE, diphenhydrAMINE, ondansetron, promethazine, simethicone, sodium chloride 0.9%    Review of patient's allergies indicates:  No Known Allergies    Objective:     Vital Signs (Most Recent):  Temp: 99 °F (37.2 °C) (11/11/17 0450)  Pulse: 76 (11/11/17 0450)  Resp: 18 (11/10/17 2201)  BP: (!) 106/59 (11/11/17 0450)  SpO2: 95 % (11/11/17 0450) Vital Signs (24h Range):  Temp:  [98.6 °F (37 °C)-99 °F (37.2 °C)] 99 °F (37.2 °C)  Pulse:  [] 76  Resp:  [18] 18  SpO2:  [95 %-99 %] 95 %  BP: (106-139)/(59-80) 106/59     Weight: 59 kg (129 lb 15.7 oz)  Body mass index is 24.56 kg/m².  Patient's last menstrual period was 10/19/2017 (approximate).    I&O (Last 24H):      Intake/Output Summary (Last 24 hours) at 11/11/17 1031  Last data filed at 11/11/17 0600      Laboratory:      Diagnostic Results:      Physical Exam:   Constitutional: She is oriented to person, place, and time. She appears well-developed and well-nourished.       Cardiovascular: Normal rate.     Pulmonary/Chest: Effort normal.        Abdominal: Soft. She exhibits abdominal incision (Clean, dry and intact). Tenderness: Mild incisional tenderness, no fundal tenderness.     Genitourinary: Uterus normal.           Musculoskeletal: She exhibits edema (1+ edema). She exhibits no tenderness.       Neurological: She is alert and oriented to person, place, and time.     Psychiatric: She has a normal mood and affect.

## 2017-11-12 VITALS
RESPIRATION RATE: 16 BRPM | TEMPERATURE: 98 F | SYSTOLIC BLOOD PRESSURE: 122 MMHG | HEART RATE: 77 BPM | DIASTOLIC BLOOD PRESSURE: 75 MMHG | BODY MASS INDEX: 24.55 KG/M2 | WEIGHT: 130 LBS | OXYGEN SATURATION: 98 % | HEIGHT: 61 IN

## 2017-11-12 PROCEDURE — 25000003 PHARM REV CODE 250: Performed by: OBSTETRICS & GYNECOLOGY

## 2017-11-12 PROCEDURE — 99024 POSTOP FOLLOW-UP VISIT: CPT | Mod: ,,, | Performed by: OBSTETRICS & GYNECOLOGY

## 2017-11-12 RX ORDER — HYDROMORPHONE HYDROCHLORIDE 2 MG/1
2 TABLET ORAL EVERY 4 HOURS PRN
Qty: 45 TABLET | Refills: 0 | Status: SHIPPED | OUTPATIENT
Start: 2017-11-12 | End: 2017-11-19

## 2017-11-12 RX ORDER — PROMETHAZINE HYDROCHLORIDE 25 MG/1
12.5 TABLET ORAL EVERY 6 HOURS PRN
Qty: 15 TABLET | Refills: 0 | Status: SHIPPED | OUTPATIENT
Start: 2017-11-12 | End: 2017-11-28

## 2017-11-12 RX ORDER — IBUPROFEN 600 MG/1
600 TABLET ORAL EVERY 6 HOURS
Qty: 60 TABLET | Refills: 1 | Status: SHIPPED | OUTPATIENT
Start: 2017-11-12 | End: 2018-10-22

## 2017-11-12 RX ADMIN — IBUPROFEN 600 MG: 600 TABLET, FILM COATED ORAL at 12:11

## 2017-11-12 RX ADMIN — FAMOTIDINE 20 MG: 20 TABLET, FILM COATED ORAL at 09:11

## 2017-11-12 RX ADMIN — HYDROMORPHONE HYDROCHLORIDE 2 MG: 2 TABLET ORAL at 03:11

## 2017-11-12 RX ADMIN — IBUPROFEN 600 MG: 600 TABLET, FILM COATED ORAL at 06:11

## 2017-11-12 RX ADMIN — POLYETHYLENE GLYCOL 3350 17 G: 17 POWDER, FOR SOLUTION ORAL at 09:11

## 2017-11-12 RX ADMIN — HYDROMORPHONE HYDROCHLORIDE 2 MG: 2 TABLET ORAL at 09:11

## 2017-11-12 RX ADMIN — HYDROMORPHONE HYDROCHLORIDE 2 MG: 2 TABLET ORAL at 12:11

## 2017-11-12 RX ADMIN — HYDROMORPHONE HYDROCHLORIDE 2 MG: 2 TABLET ORAL at 06:11

## 2017-11-12 RX ADMIN — STANDARDIZED SENNA CONCENTRATE AND DOCUSATE SODIUM 1 TABLET: 8.6; 5 TABLET, FILM COATED ORAL at 09:11

## 2017-11-12 NOTE — PLAN OF CARE
Problem: Patient Care Overview  Goal: Plan of Care Review  Outcome: Ongoing (interventions implemented as appropriate)  IS self administered, patient on room air.

## 2017-11-12 NOTE — ASSESSMENT & PLAN NOTE
Pain management is greatly improved.  Patient meeting all post operative milestones without difficulty.  Will discharge home today.

## 2017-11-12 NOTE — PROGRESS NOTES
[facility]  Obstetrics & Gynecology  Progress Note    Patient Name: Olga Prater  MRN: [id number]  Admission Date: 11/9/2017  Primary Care Provider: Primary Doctor No  Principal Problem: Status post myomectomy    Subjective:     HPI:  32 year old s/p open myomectomy/2U PRBCs        Scheduled Meds:   famotidine  20 mg Oral BID    HYDROmorphone  2 mg Oral Q3H    ibuprofen  600 mg Oral Q6H    mupirocin  1 g Nasal BID    pantoprazole  40 mg Intravenous Before breakfast    polyethylene glycol  17 g Oral Daily    senna-docusate 8.6-50 mg  1 tablet Oral BID     Continuous Infusions:   dextrose 5 % and 0.45 % NaCl Stopped (11/10/17 0600)    lactated ringers       PRN Meds:bisacodyl, diphenhydrAMINE, diphenhydrAMINE, ondansetron, promethazine, simethicone, sodium chloride 0.9%    Review of patient's allergies indicates:  No Known Allergies    Objective:     Vital Signs (Most Recent):  Temp: 98.3 °F (36.8 °C) (11/12/17 0735)  Pulse: 77 (11/12/17 0735)  Resp: 16 (11/12/17 0735)  BP: 122/75 (11/12/17 0735)  SpO2: 98 % (11/12/17 0735) Vital Signs (24h Range):  Temp:  [96.2 °F (35.7 °C)-99.3 °F (37.4 °C)] 98.3 °F (36.8 °C)  Pulse:  [] 77  Resp:  [16-18] 16  SpO2:  [96 %-100 %] 98 %  BP: (122-132)/(70-82) 122/75     Weight: 59 kg (129 lb 15.7 oz)  Body mass index is 24.56 kg/m².  Patient's last menstrual period was 10/19/2017 (approximate).    I&O (Last 24H):  No intake or output data in the 24 hours ending 11/12/17 0930    Laboratory:  None    Diagnostic Results:  None    Physical Exam:   Constitutional: She is oriented to person, place, and time. She appears well-developed and well-nourished. No distress.       Cardiovascular: Normal rate.     Pulmonary/Chest: Effort normal.        Abdominal: Soft. She exhibits abdominal incision. She exhibits no distension. There is no tenderness. There is no rebound and no guarding.   Incision healing well without drainage or erythema              Musculoskeletal: She exhibits no edema or tenderness.       Neurological: She is alert and oriented to person, place, and time.    Skin: Skin is warm and dry.    Psychiatric: She has a normal mood and affect.       Assessment/Plan:     * Status post myomectomy    Pain management is greatly improved.  Patient meeting all post operative milestones without difficulty.  Will discharge home today.         Menorrhagia    Patient reports no vaginal bleeding at this time.            Deloris Gruber MD  Obstetrics & Gynecology  [facility]

## 2017-11-12 NOTE — SUBJECTIVE & OBJECTIVE
Scheduled Meds:   famotidine  20 mg Oral BID    HYDROmorphone  2 mg Oral Q3H    ibuprofen  600 mg Oral Q6H    mupirocin  1 g Nasal BID    pantoprazole  40 mg Intravenous Before breakfast    polyethylene glycol  17 g Oral Daily    senna-docusate 8.6-50 mg  1 tablet Oral BID     Continuous Infusions:   dextrose 5 % and 0.45 % NaCl Stopped (11/10/17 0600)    lactated ringers       PRN Meds:bisacodyl, diphenhydrAMINE, diphenhydrAMINE, ondansetron, promethazine, simethicone, sodium chloride 0.9%    Review of patient's allergies indicates:  No Known Allergies    Objective:     Vital Signs (Most Recent):  Temp: 98.3 °F (36.8 °C) (11/12/17 0735)  Pulse: 77 (11/12/17 0735)  Resp: 16 (11/12/17 0735)  BP: 122/75 (11/12/17 0735)  SpO2: 98 % (11/12/17 0735) Vital Signs (24h Range):  Temp:  [96.2 °F (35.7 °C)-99.3 °F (37.4 °C)] 98.3 °F (36.8 °C)  Pulse:  [] 77  Resp:  [16-18] 16  SpO2:  [96 %-100 %] 98 %  BP: (122-132)/(70-82) 122/75     Weight: 59 kg (129 lb 15.7 oz)  Body mass index is 24.56 kg/m².  Patient's last menstrual period was 10/19/2017 (approximate).    I&O (Last 24H):  No intake or output data in the 24 hours ending 11/12/17 0930    Laboratory:  None    Diagnostic Results:  None    Physical Exam:   Constitutional: She is oriented to person, place, and time. She appears well-developed and well-nourished. No distress.       Cardiovascular: Normal rate.     Pulmonary/Chest: Effort normal.        Abdominal: Soft. She exhibits abdominal incision. She exhibits no distension. There is no tenderness. There is no rebound and no guarding.   Incision healing well without drainage or erythema             Musculoskeletal: She exhibits no edema or tenderness.       Neurological: She is alert and oriented to person, place, and time.    Skin: Skin is warm and dry.    Psychiatric: She has a normal mood and affect.

## 2017-11-12 NOTE — NURSING
New orders for discharge, pt agreeable with discharge. Discharge teaching done at bedside, pt verbalized understanding. Meds called in per Dr. Pastrana to [facility] pharmacy of choice. No iv access, voids at will without complications. Transport placed. All belongings with patient.

## 2017-11-12 NOTE — NURSING
Pt AAOx4. No s/s of distress. Safety, and comfort measures in place. Pt has no IV access, and is wearing personal clothing. Pt reports pain that is well controlled by scheduled meds. Pt able to rest comfortably throughout night.

## 2017-11-12 NOTE — DISCHARGE SUMMARY
[facility]  Obstetrics & Gynecology  Discharge Summary    Patient Name: Olga Prater  MRN: [id number]  Admission Date: 11/9/2017  Hospital Length of Stay: 3 days  Discharge Date and Time:  11/12/2017 9:41 AM  Attending Physician: Mohan Pastrana MD   Discharging Provider: Deloris Gruber MD  Primary Care Provider: Primary Doctor No    HPI:  32 year old s/p open myomectomy/2U PRBCs    Hospital Course:  POD#1: complaint of incisional pain and abdominal pain, percocet not helping. Tolerating clears, no vomiting, some nausea.  Kunz replaced due to urinary retention. Has been up twice today. No flatus, burping.   POD#2: patient complains of continued incisional pain and burning, slightly better than yesterday, using Dilaudid for pain control. She continues to have nausea and has not yet tolerated regular diet. She just passed flatus, has not had a bowel movement.  POD#3: patient doing well this morning, pain controlled with dilaudid and motrin.  Eating/ambulation/voiding without difficulty.     Procedure(s) (LRB):  MYOMECTOMY- OPEN (N/A)         Significant Diagnostic Studies: no diagnostic studies performed post operatively    Pending Diagnostic Studies:     None        Final Active Diagnoses:    Diagnosis Date Noted POA    PRINCIPAL PROBLEM:  Status post myomectomy [Z98.890] 11/10/2017 Not Applicable    Menorrhagia [N92.0] 11/07/2017 Yes      Problems Resolved During this Admission:    Diagnosis Date Noted Date Resolved POA        Discharged Condition: good    Disposition: Home or Self Care    Follow Up:  Follow-up Information     Mohan Pastrana MD In 2 weeks.    Specialties:  Obstetrics and Gynecology, Obstetrics, Gynecology  Why:  Post Op  Contact information:  [address]  [address]  [facility] [id number] [phone]                 Patient Instructions:     Diet general     Other restrictions (specify):   Order Comments: Pelvic rest for 6 weeks  No baths, shower only  No heavy  lifting or strenuous activity     No dressing needed       Medications:  Reconciled Home Medications:   Current Discharge Medication List      START taking these medications    Details   HYDROmorphone (DILAUDID) 2 MG tablet Take 1 tablet (2 mg total) by mouth every 4 (four) hours as needed for Pain.  Qty: 45 tablet, Refills: 0      ibuprofen (ADVIL,MOTRIN) 600 MG tablet Take 1 tablet (600 mg total) by mouth every 6 (six) hours.  Qty: 60 tablet, Refills: 1      promethazine (PHENERGAN) 25 MG tablet Take 0.5 tablets (12.5 mg total) by mouth every 6 (six) hours as needed for Nausea.  Qty: 15 tablet, Refills: 0         CONTINUE these medications which have NOT CHANGED    Details   PRENATAL VIT CALC,IRON,FOLIC (PRENATAL VITAMIN ORAL) Take by mouth.         STOP taking these medications       norgestimate-ethinyl estradiol (ORTHO-CYCLEN) 0.25-35 mg-mcg per tablet Comments:   Reason for Stopping:         norgestimate-ethinyl estradiol (ORTHO-CYCLEN) 0.25-35 mg-mcg per tablet Comments:   Reason for Stopping:         ondansetron (ZOFRAN) 8 MG tablet Comments:   Reason for Stopping:               Deloris Gruber MD  Obstetrics & Gynecology  [facility]

## 2017-11-14 ENCOUNTER — PATIENT MESSAGE (OUTPATIENT)
Dept: OBSTETRICS AND GYNECOLOGY | Facility: CLINIC | Age: 32
End: 2017-11-14

## 2017-11-20 NOTE — PHYSICIAN QUERY
"PT Name: Olga Prater  MR #: [id number]    Physician Query Form - Hematology Clarification      CDS/: NIHARIKA Toussaint,RNC-[email or web address]           Contact information:[email or web address]    This form is a permanent document in the medical record.      Query Date: November 20, 2017    By submitting this query, we are merely seeking further clarification of documentation. Please utilize your independent clinical judgment when addressing the question(s) below.    The Medical record contains the following:   Indicators  Supporting Clinical Findings Location in Medical Record   X "Anemia" documented Anemia OP note 11/10   X H & H = Hgb=9.9-12.0  Hct=30.5-36.8 LAB 11/9-11/10    BP =                     HR=      "GI bleeding" documented     X Acute bleeding (Non GI site) ESTIMATED BLOOD LOSS:  1200 mL OP note 11/10   X Transfusion(s) IV FLUIDS:  1200 mL of LR with transfusion of 2 units of packed red blood cells   intraoperative. OP note 11/10    Treatment:     X Other:  PROCEDURE:  Open myomectomy x3. OP note 11/10     Provider, please specify diagnosis or diagnoses associated with above clinical findings.    [  ] Acute blood loss anemia expected post-operatively  [ X ] Acute blood loss anemia  [X  ] Other Hematological Diagnosis (please specify): __Acute blood loss intraoperatively__________________________  [  ] Clinically Undetermined       Please document in your progress notes daily for the duration of treatment, until resolved, and include in your discharge summary.                                                                                                      "

## 2017-11-28 ENCOUNTER — OFFICE VISIT (OUTPATIENT)
Dept: OBSTETRICS AND GYNECOLOGY | Facility: CLINIC | Age: 32
End: 2017-11-28
Payer: COMMERCIAL

## 2017-11-28 VITALS
BODY MASS INDEX: 24.04 KG/M2 | HEIGHT: 61 IN | WEIGHT: 127.31 LBS | SYSTOLIC BLOOD PRESSURE: 124 MMHG | DIASTOLIC BLOOD PRESSURE: 78 MMHG

## 2017-11-28 DIAGNOSIS — Z48.89 POSTOPERATIVE VISIT: Primary | ICD-10-CM

## 2017-11-28 PROCEDURE — 99499 UNLISTED E&M SERVICE: CPT | Mod: S$GLB,,, | Performed by: OBSTETRICS & GYNECOLOGY

## 2017-11-28 PROCEDURE — 99999 PR PBB SHADOW E&M-EST. PATIENT-LVL II: CPT | Mod: PBBFAC,,, | Performed by: OBSTETRICS & GYNECOLOGY

## 2017-12-19 ENCOUNTER — OFFICE VISIT (OUTPATIENT)
Dept: OBSTETRICS AND GYNECOLOGY | Facility: CLINIC | Age: 32
End: 2017-12-19
Payer: COMMERCIAL

## 2017-12-19 VITALS
BODY MASS INDEX: 23.73 KG/M2 | DIASTOLIC BLOOD PRESSURE: 84 MMHG | WEIGHT: 125.69 LBS | HEIGHT: 61 IN | SYSTOLIC BLOOD PRESSURE: 140 MMHG

## 2017-12-19 DIAGNOSIS — Z48.89 POSTOPERATIVE VISIT: Primary | ICD-10-CM

## 2017-12-19 PROCEDURE — 99024 POSTOP FOLLOW-UP VISIT: CPT | Mod: S$GLB,,, | Performed by: OBSTETRICS & GYNECOLOGY

## 2017-12-19 PROCEDURE — 99999 PR PBB SHADOW E&M-EST. PATIENT-LVL III: CPT | Mod: PBBFAC,,, | Performed by: OBSTETRICS & GYNECOLOGY

## 2017-12-19 NOTE — PROGRESS NOTES
"CC: 6 wk post op    Olga Prater is a 32 y.o. female  6 wk s/p open myomectomy had a cycle and was very light with minimal pain.  Wants to do condoms for contraception.     Past Medical History:   Diagnosis Date    Fibroadenoma of right breast     Menarche     Age of onset 13    Migraine     Severe preeclampsia 2017       Past Surgical History:   Procedure Laterality Date    BREAST BIOPSY      fibroadenoma     SECTION      for NRFHTs, induction for Severe PreEclampsia    MYOMECTOMY  2017    WISDOM TOOTH EXTRACTION         OB History    Para Term  AB Living   1 1 1 0 0 1   SAB TAB Ectopic Multiple Live Births   0 0 0 0 1      # Outcome Date GA Lbr Naldo/2nd Weight Sex Delivery Anes PTL Lv   1 Term 17 38w3d  2.77 kg (6 lb 1.7 oz) M CS-LTranv EPI, Spinal N DREA      Complications: Fetal Intolerance,Severe preeclampsia          Family History   Problem Relation Age of Onset    Diabetes Father      Pre    Hypertension Father     Skin cancer Father     Cancer Father     Thyroid disease Mother     Hypertension Sister     Breast cancer Maternal Grandmother     Cancer Maternal Grandmother     Breast cancer Maternal Aunt     Colon cancer Neg Hx     Ovarian cancer Neg Hx     Melanoma Neg Hx        Social History   Substance Use Topics    Smoking status: Never Smoker    Smokeless tobacco: Never Used    Alcohol use Yes      Comment: social        BP (!) 140/84   Ht 5' 1" (1.549 m)   Wt 57 kg (125 lb 10.6 oz)   Breastfeeding? Yes   BMI 23.74 kg/m²     ROS:  GENERAL: Denies weight gain or weight loss. Feeling well overall.   SKIN: Denies rash or lesions.   HEAD: Denies head injury or headache.   NODES: Denies enlarged lymph nodes.   CHEST: Denies chest pain or shortness of breath.   CARDIOVASCULAR: Denies palpitations or left sided chest pain.   ABDOMEN: No abdominal pain, constipation, diarrhea, nausea, vomiting or rectal bleeding.   URINARY: No " frequency, dysuria, hematuria, or burning on urination.  REPRODUCTIVE: See HPI.   BREASTS: denies pain, lumps, or nipple discharge.   HEMATOLOGIC: No easy bruisability or excessive bleeding.  MUSCULOSKELETAL: Denies joint pain or swelling.   NEUROLOGIC: Denies syncope or weakness.   PSYCHIATRIC: Denies depression, anxiety or mood swings.    Physical Exam:    APPEARANCE: Well nourished, well developed, in no acute distress.  AFFECT: WNL, alert and oriented x 3  SKIN: No acne or hirsutism  NECK: Neck symmetric without masses or thyromegaly  NODES: No inguinal, cervical, axillary, or femoral lymph node enlargement  CHEST: Good respiratory effect  ABDOMEN: Soft.  No tenderness or masses.  No hepatosplenomegaly.  No hernias.   EXTREMITIES: No edema.    ASSESSMENT AND PLAN    Diagnoses and all orders for this visit:    Postoperative visit        Return in about 3 months (around 3/19/2018) for annual.

## 2018-01-17 ENCOUNTER — PATIENT MESSAGE (OUTPATIENT)
Dept: OBSTETRICS AND GYNECOLOGY | Facility: CLINIC | Age: 33
End: 2018-01-17

## 2018-01-17 DIAGNOSIS — N63.10 BREAST MASS, RIGHT: Primary | ICD-10-CM

## 2018-02-28 ENCOUNTER — PATIENT MESSAGE (OUTPATIENT)
Dept: OBSTETRICS AND GYNECOLOGY | Facility: CLINIC | Age: 33
End: 2018-02-28

## 2018-02-28 DIAGNOSIS — M62.08 DIASTASIS OF RECTUS ABDOMINIS: Primary | ICD-10-CM

## 2018-03-05 ENCOUNTER — OFFICE VISIT (OUTPATIENT)
Dept: DERMATOLOGY | Facility: CLINIC | Age: 33
End: 2018-03-05
Payer: COMMERCIAL

## 2018-03-05 DIAGNOSIS — L81.4 LENTIGO: ICD-10-CM

## 2018-03-05 DIAGNOSIS — D48.5 NEOPLASM OF UNCERTAIN BEHAVIOR OF SKIN: Primary | ICD-10-CM

## 2018-03-05 DIAGNOSIS — D22.9 NEVUS: ICD-10-CM

## 2018-03-05 DIAGNOSIS — Z86.018 HISTORY OF DYSPLASTIC NEVUS: ICD-10-CM

## 2018-03-05 PROCEDURE — 11100 PR BIOPSY OF SKIN LESION: CPT | Mod: S$GLB,,, | Performed by: DERMATOLOGY

## 2018-03-05 PROCEDURE — 99214 OFFICE O/P EST MOD 30 MIN: CPT | Mod: 25,S$GLB,, | Performed by: DERMATOLOGY

## 2018-03-05 PROCEDURE — 11101 PR BIOPSY, EACH ADDED LESION: CPT | Mod: S$GLB,,, | Performed by: DERMATOLOGY

## 2018-03-05 PROCEDURE — 99999 PR PBB SHADOW E&M-EST. PATIENT-LVL III: CPT | Mod: PBBFAC,,, | Performed by: DERMATOLOGY

## 2018-03-05 PROCEDURE — 88305 TISSUE EXAM BY PATHOLOGIST: CPT | Performed by: PATHOLOGY

## 2018-03-05 NOTE — PROGRESS NOTES
Subjective:       Patient ID:  Olga Prater is a 32 y.o. female who presents for   Chief Complaint   Patient presents with    Lesion     Pt here today for a TBSE. C/o dark lesion on abdomen x a few years. Getting darker, was maybe darker with pregnancy. NO bleeding or pain. No prev tx.    Hx of tanning bed use and extensive sun exposure.       Lesion         Review of Systems   Skin: Positive for activity-related sunscreen use. Negative for daily sunscreen use and recent sunburn.   Hematologic/Lymphatic: Does not bruise/bleed easily.        Objective:    Physical Exam   Constitutional: She appears well-developed and well-nourished. No distress.   Neurological: She is alert and oriented to person, place, and time. She is not disoriented.   Psychiatric: She has a normal mood and affect.   Skin:   Areas Examined (abnormalities noted in diagram):   Scalp / Hair Palpated and Inspected  Head / Face Inspection Performed  Neck Inspection Performed  Chest / Axilla Inspection Performed  Abdomen Inspection Performed  Genitals / Buttocks / Groin Inspection Performed  Back Inspection Performed  RUE Inspected  LUE Inspection Performed  RLE Inspected  LLE Inspection Performed  Nails and Digits Inspection Performed                      left abdomen    right cheek     right lat calf    mid upper back          Diagram Legend     Erythematous scaling macule/papule c/w actinic keratosis       Vascular papule c/w angioma      Pigmented verrucoid papule/plaque c/w seborrheic keratosis      Yellow umbilicated papule c/w sebaceous hyperplasia      Irregularly shaped tan macule c/w lentigo     1-2 mm smooth white papules consistent with Milia      Movable subcutaneous cyst with punctum c/w epidermal inclusion cyst      Subcutaneous movable cyst c/w pilar cyst      Firm pink to brown papule c/w dermatofibroma      Pedunculated fleshy papule(s) c/w skin tag(s)      Evenly pigmented macule c/w junctional nevus     Mildly variegated  pigmented, slightly irregular-bordered macule c/w mildly atypical nevus      Flesh colored to evenly pigmented papule c/w intradermal nevus       Pink pearly papule/plaque c/w basal cell carcinoma      Erythematous hyperkeratotic cursted plaque c/w SCC      Surgical scar with no sign of skin cancer recurrence      Open and closed comedones      Inflammatory papules and pustules      Verrucoid papule consistent consistent with wart     Erythematous eczematous patches and plaques     Dystrophic onycholytic nail with subungual debris c/w onychomycosis     Umbilicated papule    Erythematous-base heme-crusted tan verrucoid plaque consistent with inflamed seborrheic keratosis     Erythematous Silvery Scaling Plaque c/w Psoriasis     See annotation      Assessment / Plan:      Pathology Orders:     Normal Orders This Visit    Tissue Specimen To Pathology, Dermatology     Questions:    Directional Terms:  Other(comment)    Clinical information:  r/o atypical nevus    Specific Site:  left upper abdomen    Tissue Specimen To Pathology, Dermatology     Questions:    Directional Terms:  Other(comment)    Clinical information:  r/o atypical nevus    Specific Site:  mid upper back        Neoplasm of uncertain behavior of skin  Shave biopsy procedure note: mid upper back     Shave biopsy performed after verbal consent including risk of infection, scar, recurrence, need for additional treatment of site. Area prepped with alcohol, anesthetized with approximately 1.0cc of 1% lidocaine with epinephrine. Lesional tissue shaved with razor blade. Hemostasis achieved with application of aluminum chloride followed by hyfrecation. No complications. Dressing applied. Wound care explained.      Punch biopsy procedure note: left abdomen  Punch biopsy performed after verbal consent obtained. Area marked and prepped with alcohol. Approximately 1cc of 1% lidocaine with epinephrine injected. 6 mm disposable punch used to remove lesion. Hemostasis  obtained and biopsy site closed with 1 - 2 Prolene sutures. Wound care instructions reviewed with patient and handout given.    -     Tissue Specimen To Pathology, Dermatology  -     Tissue Specimen To Pathology, Dermatology    Lentigo  This is a benign hyperpigmented sun induced lesion. Daily sun protection will reduce the number of new lesions. Treatment of these benign lesions are considered cosmetic.  The nature of sun-induced photo-aging and skin cancers is discussed.  Sun avoidance, protective clothing, and the use of 30-SPF sunscreens is advised. Observe closely for skin damage/changes, and call if such occurs.    Nevus  Discussed ABCDE's of nevi.  Monitor for new mole or moles that are becoming bigger, darker, irritated, or developing irregular borders. Brochure provided.    History of dysplastic nevus  Left  Upper arm hx of mildly atypical nevus with 2mm tan recurrence centrally . Regular appearing. Will monitor   Area of previous atypical nevus/nevi examined. Site well healed   Total body skin examination performed today including at least 12 points as noted in physical examination. Lesion/lesions suspicious for atypical nevi noted.             Follow-up in about 2 weeks (around 3/19/2018) for po and prn bx report.

## 2018-03-09 ENCOUNTER — PATIENT MESSAGE (OUTPATIENT)
Dept: DERMATOLOGY | Facility: CLINIC | Age: 33
End: 2018-03-09

## 2018-03-21 ENCOUNTER — CLINICAL SUPPORT (OUTPATIENT)
Dept: REHABILITATION | Facility: HOSPITAL | Age: 33
End: 2018-03-21
Attending: OBSTETRICS & GYNECOLOGY
Payer: COMMERCIAL

## 2018-03-21 DIAGNOSIS — M62.08 DIASTASIS RECTI: ICD-10-CM

## 2018-03-21 PROCEDURE — 97161 PT EVAL LOW COMPLEX 20 MIN: CPT | Mod: PO | Performed by: PHYSICAL THERAPIST

## 2018-03-21 NOTE — PLAN OF CARE
"                                                    Physical Therapy Initial Evaluation     Name: Olga Freeman [facility]  Clinic Number: [id number]    Diagnosis: No diagnosis found.  Physician: Mohan Pastrana MD  Treatment Orders: PT Eval and Treat  Past Medical History:   Diagnosis Date    Fibroadenoma of right breast     Menarche     Age of onset 13    Migraine     Severe preeclampsia 2017     Current Outpatient Prescriptions   Medication Sig    ibuprofen (ADVIL,MOTRIN) 600 MG tablet Take 1 tablet (600 mg total) by mouth every 6 (six) hours.     No current facility-administered medications for this visit.      Review of patient's allergies indicates:  No Known Allergies    Evaluation Date: 3/21/18  Visit # authorized: 30  Authorization period: 3/9/18-18  Plan of care Expiration: 18  MD referral: yes    Subjective     Patient states:  She is having LBP since giving birth 7 months ago. Having concerns about abdominal diastasis and feels she is having difficulty performing proper abdominal contraction.   Pain Scale: Olga rates pain on a scale of 0-10 to be 4 at worst; 2 currently; 1 at best .  Onset: sudden  Radicular symptoms:  none  Aggravating factors:   Sit up or forward  Easing factors:  Laying down  Prior Therapy: none  Functional Deficits Leading to Referral: Pt is having LBP and difficulty performing proper abdominal contraction since delivery  Prior functional status: I with ADL without pain  Occupation:  Nurse   unit                      Pts goals:  Alleviate back pain, perform proper abdominal contraction    Objective     Posture Alignment: normal IS angle of 90 degrees, rib expansion 1/2" exhibiting high costal breathing    Lower abdominal MMT: 2/5 with pt exhibiting abdominal bulge and poor pelvic control with LE movements    LE ROM wnl    LOWER EXTREMITY STRENGTH: will measure next visit    Dermatomes: Sensation: Light Touch: Intact    Special Tests:   Left Right   Slump - - "   SLR - -   BKFO + +     GAIT: Olga ambulates with no assistive device with independently.     GAIT DEVIATIONS: Olga displays normal gait pattern    Pt/family was provided educational information, including: role of PT, goals for PT, scheduling - pt verbalized understanding. Discussed insurance limitations with pt.     TREATMENT     Time In: 1pm  Time Out: 1:40 pm    PT Evaluation Completed? Yes  Discussed Plan of Care with patient: Yes    Olga received 10 minutes of therapeutic exercise & instruction including:  Demonstration of proper diaphragmatic breathing, transversus abdominis activation      Written Home Exercises Provided: yes  Olga demo good understanding of the education provided. Patient demo good return demo of skill of exercises.    Assessment     Patient presents with LBP, abdominal weakness, decreased pelvic control with LE movements.  Pt prognosis is Excellent.  Pt will benefit from skilled outpatient physical therapy to address the above stated deficits, provide pt/family education and to maximize pt's level of independence.     Medical necessity is demonstrated by the following IMPAIRMENTS/PROBLEMS:  1. Increased Pain  2. Decreased Segmental Mobility & Decreased ROM  3. Decreased Core & BLE strength  4. Decreased Flexibility BLE  5. Decreased Tolerance to Functional Activities    Pt's spiritual, cultural and educational needs considered and pt agreeable to plan of care and goals as stated below:     CMS Impairment/Limitation/Restriction for FOTO Muscle, Tendon + Soft Tissue Disorders Survey  Status Limitation G-Code CMS Severity Modifier  Intake 65% 35% Current Status CJ - At least 20 percent but less than 40 percent  Predicted 73% 27% Goal Status+ CJ - At least 20 percent but less than 40 percent  Anticipated Barriers for physical therapy: none    Short Term GOALS: 1 weeks. Pt agrees with goals set.  1. Patient demonstrates independence with HEP.   2. Patient demonstrates independence with  "Postural Awareness, able to activate abdominal mms during adl/ambulation without pain  3. Patient demonstrates independence with body mechanics.     Long Term GOALS: 3 weeks. Pt agrees with goals set.  1. Patient demonstrates increased abdominal MMT to 3/5 to improve tolerance to functional activities pain free.   2. Patient demonstrates increased rib expansion to 1-2" for optimal breathing   3. Patient demonstrates improved overall function per FOTO Lumbar Survey to 34% Limitation or less.       PLAN     Outpatient physical therapy 1 time every other week x 3 visits to include: pt ed, hep, therapeutic exercises    Therapist: Taya Rodney, PT  3/21/2018      "

## 2018-03-21 NOTE — PATIENT INSTRUCTIONS
[employer] Home Exercise Program  Created by lauro lopez Jan 16th, 2018      Total 6      [place] Exercise 1     1. Lie on the bed/floor with knees bent and arms at your side. 2. Hold your abdominals in by doing your basic breath contraction keeping your pelvis in neutral. 3. Keeping one knee bent, slowly slide the opposite leg out until its straight, then slide it back in. 4. Relax your abdominals and repeat with other leg    WHEN YOU CAN COMFORTABLY DO 20 LEG SLIDES ON EACH SIDE MOVE TO EXERCISE 2.  Repeat 15 Times     Complete 1 Set     Perform 2 Time(s) a Day            [place] Exercise 2    1. Lie on floor with knees bent and arms at your side. 2. Pull in your abdominals, keeping neutral pelvis, and hold. 3. Raise one knee towards your chest and slowly straighten it out parallel to the floor. 4. Return extended leg to starting position and repeat with other leg. 5. Work up to 5 reps on each side without stopping    ONCE ABLE TO PERFORM 20 OR MORE ON EACH SIDE THEN MOVE TO EXERCISE 3  Repeat 15 Times     Complete 1 Set     Perform 2 Time(s) a Day            [place]ann Exercise 3    1. Lie on your back with knees bent and arms at your side. 2. Pull in your abdominals, keeping neutral pelvis and hold. 3. Bring your legs up one at a time towards your body with knees bent.     ONCE ABLE TO PERFORM 20 REPS ON EACH LEG MOVE TO EXERCISE 4.  Repeat 15 Times     Complete 1 Set     Perform 2 Time(s) a Day            PGM I-II (Posterior Gluteus Medius Activation)    -Sidelying, with top hip and shoulder forward. -Exhale, engage abdominal muscles -Lift knee 6-8 inches. -Muscle work should be felt in gluteal area     Repeat 15 Times     Hold 0 Seconds     Complete 1 Set     Perform 2 Time(s) a Day           Copyright [employer] Home Exercise Program  Created by lauro lopez Mar 21st, 2018    [place] Breathing  Lay relaxed on stomach resting head comfortably on forearms. Perform  slow rhythmic deep  breathing in through the nose and out the mouth. You  should feel the belly pressing down into the floor and sides expanding out  during inhalation such that all the muscles around the low back expand  outward in a 360 degree fashion. Make sure to work on pulling the ribs  down using your abdominal muscles on exhalation. 10 reps, 2-3x/day  Powered by [employer]

## 2018-04-28 ENCOUNTER — OFFICE VISIT (OUTPATIENT)
Dept: INTERNAL MEDICINE | Facility: CLINIC | Age: 33
End: 2018-04-28
Payer: COMMERCIAL

## 2018-04-28 VITALS
DIASTOLIC BLOOD PRESSURE: 72 MMHG | RESPIRATION RATE: 15 BRPM | SYSTOLIC BLOOD PRESSURE: 118 MMHG | HEART RATE: 93 BPM | WEIGHT: 124.56 LBS | TEMPERATURE: 98 F | BODY MASS INDEX: 23.54 KG/M2

## 2018-04-28 DIAGNOSIS — R05.9 COUGH: ICD-10-CM

## 2018-04-28 DIAGNOSIS — J02.0 STREP THROAT: Primary | ICD-10-CM

## 2018-04-28 LAB — DEPRECATED S PYO AG THROAT QL EIA: NEGATIVE

## 2018-04-28 PROCEDURE — 99999 PR PBB SHADOW E&M-EST. PATIENT-LVL IV: CPT | Mod: PBBFAC,,, | Performed by: INTERNAL MEDICINE

## 2018-04-28 PROCEDURE — 3074F SYST BP LT 130 MM HG: CPT | Mod: CPTII,S$GLB,, | Performed by: INTERNAL MEDICINE

## 2018-04-28 PROCEDURE — 99214 OFFICE O/P EST MOD 30 MIN: CPT | Mod: S$GLB,,, | Performed by: INTERNAL MEDICINE

## 2018-04-28 PROCEDURE — 87880 STREP A ASSAY W/OPTIC: CPT | Mod: PO

## 2018-04-28 PROCEDURE — 3078F DIAST BP <80 MM HG: CPT | Mod: CPTII,S$GLB,, | Performed by: INTERNAL MEDICINE

## 2018-04-28 PROCEDURE — 87081 CULTURE SCREEN ONLY: CPT

## 2018-04-28 RX ORDER — AMOXICILLIN 500 MG/1
500 TABLET, FILM COATED ORAL EVERY 12 HOURS
Qty: 20 TABLET | Refills: 0 | Status: SHIPPED | OUTPATIENT
Start: 2018-04-28 | End: 2018-05-08

## 2018-04-28 RX ORDER — HYDROCODONE BITARTRATE AND HOMATROPINE METHYLBROMIDE ORAL SOLUTION 5; 1.5 MG/5ML; MG/5ML
5 LIQUID ORAL EVERY 4 HOURS PRN
Qty: 120 ML | Refills: 0 | Status: SHIPPED | OUTPATIENT
Start: 2018-04-28 | End: 2018-10-22

## 2018-04-28 NOTE — PATIENT INSTRUCTIONS
Pharyngitis: Strep (Presumed)    You have pharyngitis (sore throat). The cause is thought to be the streptococcus, or strep, bacterium. Strep throat infection can cause throat pain that is worse when swallowing, aching all over, headache, and fever. The infection may be spread by coughing, kissing, or touching others after touching your mouth or nose. Antibiotic medications are given to treat the infection.  Home care  · Rest at home. Drink plenty of fluids to avoid dehydration.  · No work or school for the first 2 days of taking the antibiotics. After this time, you will not be contagious. You can then return to work or school if you are feeling better.   · The antibiotic medication must be taken for the full 10 days, even if you feel better. This is very important to ensure the infection is treated. It is also important to prevent drug-resistant organisms from developing. If you were given an antibiotic shot, no more antibiotics are needed.  · You may use acetaminophen or ibuprofen to control pain or fever, unless another medicine was prescribed for this. If you have chronic liver or kidney disease or ever had a stomach ulcer or GI bleeding, talk with your doctor before using these medicines.  · Throat lozenges or a throat-numbing sprays can help reduce throat pain. Gargling with warm salt water can also help. Dissolve 1/2 teaspoon of salt in 1 8 ounce glass of warm water.   · Avoid salty or spicy foods, which can irritate the throat.  Follow-up care  Follow up with your healthcare provider or our staff if you are not improving over the next week.  When to seek medical advice  Call your healthcare provider right away if any of these occur:  · Fever as directed by your doctor.   · New or worsening ear pain, sinus pain, or headache  · Painful lumps in the back of neck  · Stiff neck  · Lymph nodes are getting larger  · Inability to swallow liquids, excessive drooling, or inability to open mouth wide due to throat  pain  · Signs of dehydration (very dark urine or no urine, sunken eyes, dizziness)  · Trouble breathing or noisy breathing  · Muffled voice  · New rash  Date Last Reviewed: 4/13/2015  © 5080-4553 [employer]. [address] [place] [address], [place] [address]. All rights reserved. This information is not intended as a substitute for professional medical care. Always follow your healthcare professional's instructions.

## 2018-04-28 NOTE — PROGRESS NOTES
Subjective:       Patient ID: Olga Prater is a 32 y.o. female.    Chief Complaint: Sore Throat and Cough    HPI   Patient presenting with cough and sore throat for last 2 days.  She notes muscle aches.  She notes feeling feverish and chills.  She has a son at home with respiratory infection.  She denies any SOB or CP.  She notes difficulty swallowing.      Review of Systems   Constitutional: Positive for chills and fever. Negative for activity change.   HENT: Positive for congestion, sore throat and trouble swallowing. Negative for sinus pain and sinus pressure.    Eyes: Negative for pain and redness.   Respiratory: Positive for cough. Negative for shortness of breath.    Cardiovascular: Negative for chest pain.   Gastrointestinal: Negative for abdominal pain, constipation, nausea and vomiting.   Genitourinary: Negative for difficulty urinating.   Musculoskeletal: Positive for myalgias. Negative for arthralgias and joint swelling.   Skin: Negative for rash.   Neurological: Negative for dizziness and light-headedness.   Psychiatric/Behavioral: Negative for dysphoric mood and sleep disturbance.     Objective:     Vitals:    04/28/18 0810   BP: 118/72   Pulse: 93   Resp: 15   Temp: 98.3 °F (36.8 °C)    Body mass index is 23.54 kg/m².  Physical Exam   Constitutional: She is oriented to person, place, and time. She appears well-developed and well-nourished.   HENT:   Head: Normocephalic and atraumatic.   Right Ear: A middle ear effusion is present.   Left Ear: A middle ear effusion is present.   Nose: Right sinus exhibits no maxillary sinus tenderness and no frontal sinus tenderness. Left sinus exhibits no maxillary sinus tenderness and no frontal sinus tenderness.   Mouth/Throat: Mucous membranes are dry. Posterior oropharyngeal edema and posterior oropharyngeal erythema present. No oropharyngeal exudate.   Eyes: Conjunctivae are normal. Pupils are equal, round, and reactive to light.   Neck: Normal range of  motion. No tracheal deviation present. No thyromegaly present.   Cardiovascular: Normal rate, regular rhythm, normal heart sounds and intact distal pulses.  Exam reveals no gallop and no friction rub.    No murmur heard.  Pulmonary/Chest: Effort normal and breath sounds normal. She has no wheezes. She has no rales.   Lymphadenopathy:        Head (right side): Tonsillar adenopathy present.        Head (left side): Tonsillar adenopathy present.     She has cervical adenopathy.        Right cervical: Superficial cervical adenopathy present.        Left cervical: Superficial cervical adenopathy present.   Neurological: She is alert and oriented to person, place, and time.   Skin: Skin is warm and dry. No rash noted.   Psychiatric: She has a normal mood and affect. Judgment normal.     Assessment:     1. Strep throat    2. Cough      Plan:   1. Strep throat  - fluids, rest, OTC NSAIDs prn  - Throat Screen, Rapid  - amoxicillin (AMOXIL) 500 MG Tab; Take 1 tablet (500 mg total) by mouth every 12 (twelve) hours.  Dispense: 20 tablet; Refill: 0  - hydrocodone-homatropine 5-1.5 mg/5 ml (HYCODAN) 5-1.5 mg/5 mL Syrp; Take 5 mLs by mouth every 4 (four) hours as needed.  Dispense: 120 mL; Refill: 0    2. Cough  - as above      Patient is to call or return to clinic if symptoms worsen or fail to improve.

## 2018-04-28 NOTE — LETTER
April 28, 2018      [facility]  [address]  [place] [address]  Phone: [phone]  Fax: [phone]       Patient: Olga Prater   YOB: 1985  Date of Visit: 04/28/2018    To Whom It May Concern:    Isaiah Prater  was at [facility] on 04/28/2018. Please excuse her for acute illness.  She may return to work/school on 4/30/18 with no restrictions. If you have any questions or concerns, or if I can be of further assistance, please do not hesitate to contact me.    Sincerely,        Olena Ruvalcaba MD

## 2018-04-30 LAB — BACTERIA THROAT CULT: NORMAL

## 2018-07-17 ENCOUNTER — DOCUMENTATION ONLY (OUTPATIENT)
Dept: REHABILITATION | Facility: HOSPITAL | Age: 33
End: 2018-07-17

## 2018-07-17 NOTE — PROGRESS NOTES
PHYSICAL THERAPY DISCHARGE:    This patient was originally evaluated at our facility on: 3/21/18         Pt attended PT for a total of 1 Visits receiving: Therex, Postural Education, Body Mechanics Training, Home Exercise Instruction     This patient's last visit occurred on: 3/21/18      Short term goals were achieved: no    Long term goals were achieved: no    Pt was DC'd from our care secondary to: pt attended initial evaluation only       Therapist: Taya Rodney, PT  7/17/2018

## 2018-09-12 NOTE — PROGRESS NOTES
Subjective:   Patient reports no nausea or vomiting.    Activity level: Normal.    Pain control: Well controlled.        Objective:  Vitals:    11/28/17 1557   BP: 124/78     General appearance: Comfortable and well-appearing.    Abdomen: Abdomen is soft, non distended   Tenderness: There is no abdominal tenderness.    Wound:  Clean.  There is no dehiscence.  There is no drainage.      Assessment:   s/p open myomectomy- 2 week post op  Condition: In stable condition.     Plan:  Encourage ambulation.  Continue wound care.    
Breathing spontaneous and unlabored. Breath sounds clear and equal bilaterally with regular rhythm.

## 2018-10-17 ENCOUNTER — PATIENT MESSAGE (OUTPATIENT)
Dept: OBSTETRICS AND GYNECOLOGY | Facility: CLINIC | Age: 33
End: 2018-10-17

## 2018-10-17 ENCOUNTER — TELEPHONE (OUTPATIENT)
Dept: OBSTETRICS AND GYNECOLOGY | Facility: CLINIC | Age: 33
End: 2018-10-17

## 2018-10-17 NOTE — TELEPHONE ENCOUNTER
----- Message from Arianna Lomeli sent at 10/16/2018  5:16 PM CDT -----  Contact: self  Pt is calling in regards to scheduling an appt for a positive pregnancy test. Pt would like a call back to schedule an appt.    Pt can be reached at [phone].    Thank you

## 2018-10-22 ENCOUNTER — OFFICE VISIT (OUTPATIENT)
Dept: OBSTETRICS AND GYNECOLOGY | Facility: CLINIC | Age: 33
End: 2018-10-22
Payer: COMMERCIAL

## 2018-10-22 VITALS
SYSTOLIC BLOOD PRESSURE: 128 MMHG | DIASTOLIC BLOOD PRESSURE: 88 MMHG | HEIGHT: 61 IN | BODY MASS INDEX: 22.43 KG/M2 | WEIGHT: 118.81 LBS

## 2018-10-22 DIAGNOSIS — O34.29 PREGNANCY WITH HISTORY OF UTERINE MYOMECTOMY: ICD-10-CM

## 2018-10-22 DIAGNOSIS — N92.6 MISSED MENSES: Primary | ICD-10-CM

## 2018-10-22 DIAGNOSIS — Z87.59 HISTORY OF SEVERE PRE-ECLAMPSIA: ICD-10-CM

## 2018-10-22 DIAGNOSIS — Z32.01 POSITIVE URINE PREGNANCY TEST: ICD-10-CM

## 2018-10-22 PROCEDURE — 99214 OFFICE O/P EST MOD 30 MIN: CPT | Mod: S$GLB,,, | Performed by: NURSE PRACTITIONER

## 2018-10-22 PROCEDURE — 3008F BODY MASS INDEX DOCD: CPT | Mod: CPTII,S$GLB,, | Performed by: NURSE PRACTITIONER

## 2018-10-22 PROCEDURE — 99999 PR PBB SHADOW E&M-EST. PATIENT-LVL III: CPT | Mod: PBBFAC,,, | Performed by: NURSE PRACTITIONER

## 2018-10-22 NOTE — PROGRESS NOTES
CC: Absence of menses    (Dr. Pastrana patient)  Patient's last menstrual period was 09/15/2018.,   EDC: 19,   GA:  5w 2d      Olga Prater is a 33 y.o. female  presents with complaint of absence of menstruation.   Her first positive home UPT was 10/11/18.  States her menstrual cycles are every 28 days.  She denies nausea/vomiting, bleeding, or abdominal pain.  Hx of severe Preeclampsia - induced at 38w3d , and was on MgSO4 - c/s for fetal intolerance to labor.  Had myomectomy 2017.    UPT is: positive.     Last Pap:  2017 Normal,  HPV n/a    Past Medical History:   Diagnosis Date    Fibroadenoma of right breast     Menarche     Age of onset 13    Migraine     Severe preeclampsia 2017     Past Surgical History:   Procedure Laterality Date    BREAST BIOPSY      fibroadenoma     SECTION      for NRFHTs, induction for Severe PreEclampsia    DELIVERY- SECTION N/A 2017    [facility] at [facility]D    MYOMECTOMY  2017    MYOMECTOMY- OPEN N/A 2017    Per[facility] at [facility] OR    WISDOM TOOTH EXTRACTION       Social History     Tobacco Use    Smoking status: Never Smoker    Smokeless tobacco: Never Used   Substance Use Topics    Alcohol use: Yes     Comment: social     Drug use: No     Family History   Problem Relation Age of Onset    Diabetes Father         Pre    Hypertension Father     Skin cancer Father     Cancer Father     Thyroid disease Mother     Hypertension Sister     Breast cancer Maternal Grandmother     Cancer Maternal Grandmother     Breast cancer Maternal Aunt     Colon cancer Neg Hx     Ovarian cancer Neg Hx     Melanoma Neg Hx      OB History    Para Term  AB Living   2 1 1 0 0 1   SAB TAB Ectopic Multiple Live Births   0 0 0 0 1      # Outcome Date GA Lbr Naldo/2nd Weight Sex Delivery Anes PTL Lv   2 Current            1 Term 17 38w3d  2.77 kg (6 lb 1.7 oz) M CS-LTranv  "EPI, Spinal N DREA      Complications: Fetal Intolerance,Severe preeclampsia          /88   Ht 5' 1" (1.549 m)   Wt 53.9 kg (118 lb 13.3 oz)   LMP 09/15/2018   BMI 22.45 kg/m²     ROS:  GENERAL: Denies weight gain or weight loss. Feeling well overall.   SKIN: Denies rash or lesions.   HEAD: Denies head injury, headache, or vision changes.   NODES: Denies enlarged lymph nodes.  HEMATOLOGIC: No easy bruisability or excessive bleeding.   MUSCULOSKELETAL: Denies joint pain or swelling.    CARDIOVASCULAR: No chest pain. No shortness of breath. No leg cramps.  NEUROLOGICAL: No headaches. No vision changes.  PSYCHIATRIC: Denies depression, anxiety or mood swings.    VULVOVAGINAL: Denies itching, Denies abnormal bleeding and Denies lesions.  ABDOMEN: Denies abdominal pain. no nausea/no vomiting.  No diarrhea. No constipation  URINARY: No incontinence, no nocturia, no frequency and no dysuria.  BREASTS: The patient performs breast self-examination and denies pain, lumps, or nipple discharge.       PE:   APPEARANCE: Well nourished, well developed, in no acute distress.  AFFECT: WNL, alert and oriented x 3.  NECK: Neck symmetric without masses or thyromegaly.   CHEST: Good respiratory effort.     ASSESSMENT and PLAN:  Missed menses  -     HIV-1 and HIV-2 antibodies; Future  -     RPR; Future  -     Hepatitis B surface antigen; Future  -     Type & Screen; Future  -     Rubella antibody, IgG; Future  -     CBC auto differential; Future  -     Glucose, random; Future; Expected date: 10/22/2018  -     TSH; Future; Expected date: 10/22/2018  -     US OB/GYN Procedure ([facility]) - Extended List - Future; Future    Positive urine pregnancy test    History of severe pre-eclampsia    Pregnancy with history of uterine myomectomy  -     US OB/GYN Procedure ([facility]) - Extended List - Future; Future    *  New OB packet reviewed at length and copy given to patient.  Zika precautions given as well.  Patient was counseled today " on proper weight gain based on the [employer]'s recommendations based on her pre-pregnancy weight. Discussed foods to avoid in pregnancy (i.e. sushi, fish that are high in mercury, deli meat, and unpasteurized cheeses). Discussed prenatal vitamin options (i.e. stool softener, DHA). Optional genetic testing discussed.    *  Connected Moms-- to be discussed per MD at Initial OB visit.    Follow-up in about 3 weeks (around 11/12/2018) for F/U with physician for Initial OB visit & U/S, Labs.    ~25 minutes spent with pt Face to Face with >50% of visit spent on education/counseling.

## 2018-10-29 ENCOUNTER — PATIENT MESSAGE (OUTPATIENT)
Dept: OBSTETRICS AND GYNECOLOGY | Facility: CLINIC | Age: 33
End: 2018-10-29

## 2018-11-16 ENCOUNTER — PROCEDURE VISIT (OUTPATIENT)
Dept: OBSTETRICS AND GYNECOLOGY | Facility: CLINIC | Age: 33
End: 2018-11-16
Payer: COMMERCIAL

## 2018-11-16 ENCOUNTER — INITIAL PRENATAL (OUTPATIENT)
Dept: OBSTETRICS AND GYNECOLOGY | Facility: CLINIC | Age: 33
End: 2018-11-16
Payer: COMMERCIAL

## 2018-11-16 VITALS
WEIGHT: 119.06 LBS | DIASTOLIC BLOOD PRESSURE: 80 MMHG | BODY MASS INDEX: 22.49 KG/M2 | SYSTOLIC BLOOD PRESSURE: 118 MMHG

## 2018-11-16 DIAGNOSIS — Z3A.08 8 WEEKS GESTATION OF PREGNANCY: Primary | ICD-10-CM

## 2018-11-16 DIAGNOSIS — O34.29 PREGNANCY WITH HISTORY OF UTERINE MYOMECTOMY: ICD-10-CM

## 2018-11-16 DIAGNOSIS — Z36.89 ENCOUNTER TO ESTABLISH GESTATIONAL AGE USING ULTRASOUND: ICD-10-CM

## 2018-11-16 DIAGNOSIS — Z87.59 HISTORY OF PRE-ECLAMPSIA: ICD-10-CM

## 2018-11-16 DIAGNOSIS — N92.6 MISSED MENSES: ICD-10-CM

## 2018-11-16 PROBLEM — M62.08 DIASTASIS RECTI: Status: RESOLVED | Noted: 2018-03-21 | Resolved: 2018-11-16

## 2018-11-16 PROBLEM — D24.1 FIBROADENOMA OF RIGHT BREAST: Status: RESOLVED | Noted: 2017-01-03 | Resolved: 2018-11-16

## 2018-11-16 PROBLEM — N92.0 MENORRHAGIA: Status: RESOLVED | Noted: 2017-11-07 | Resolved: 2018-11-16

## 2018-11-16 PROCEDURE — 0502F SUBSEQUENT PRENATAL CARE: CPT | Mod: S$GLB,,, | Performed by: OBSTETRICS & GYNECOLOGY

## 2018-11-16 PROCEDURE — 87491 CHLMYD TRACH DNA AMP PROBE: CPT

## 2018-11-16 PROCEDURE — 87086 URINE CULTURE/COLONY COUNT: CPT

## 2018-11-16 PROCEDURE — 76801 OB US < 14 WKS SINGLE FETUS: CPT | Mod: S$GLB,,, | Performed by: OBSTETRICS & GYNECOLOGY

## 2018-11-16 PROCEDURE — 99999 PR PBB SHADOW E&M-EST. PATIENT-LVL III: CPT | Mod: PBBFAC,,, | Performed by: OBSTETRICS & GYNECOLOGY

## 2018-11-16 NOTE — PROCEDURES
Obstetrical ultrasound completed today.  See report in imaging section of [facility].

## 2018-11-16 NOTE — PROGRESS NOTES
S=D dates by LMP, h/o myomectomy 3 mo post delivery and h/o severe PreEclampsia. Baby aspirin at 14 weeks daily, want NT with serum integrated at 12 weeks can do ob panel with that draw.

## 2018-11-17 LAB
BACTERIA UR CULT: NO GROWTH
C TRACH DNA SPEC QL NAA+PROBE: NOT DETECTED
N GONORRHOEA DNA SPEC QL NAA+PROBE: NOT DETECTED

## 2018-12-06 ENCOUNTER — OFFICE VISIT (OUTPATIENT)
Dept: INTERNAL MEDICINE | Facility: CLINIC | Age: 33
End: 2018-12-06
Payer: COMMERCIAL

## 2018-12-06 VITALS
TEMPERATURE: 99 F | SYSTOLIC BLOOD PRESSURE: 119 MMHG | WEIGHT: 118.81 LBS | HEART RATE: 91 BPM | RESPIRATION RATE: 14 BRPM | HEIGHT: 62 IN | BODY MASS INDEX: 21.86 KG/M2 | DIASTOLIC BLOOD PRESSURE: 77 MMHG

## 2018-12-06 DIAGNOSIS — J02.9 ACUTE PHARYNGITIS, UNSPECIFIED ETIOLOGY: Primary | ICD-10-CM

## 2018-12-06 LAB — DEPRECATED S PYO AG THROAT QL EIA: NEGATIVE

## 2018-12-06 PROCEDURE — 3078F DIAST BP <80 MM HG: CPT | Mod: CPTII,S$GLB,, | Performed by: INTERNAL MEDICINE

## 2018-12-06 PROCEDURE — 99213 OFFICE O/P EST LOW 20 MIN: CPT | Mod: S$GLB,,, | Performed by: INTERNAL MEDICINE

## 2018-12-06 PROCEDURE — 3008F BODY MASS INDEX DOCD: CPT | Mod: CPTII,S$GLB,, | Performed by: INTERNAL MEDICINE

## 2018-12-06 PROCEDURE — 87880 STREP A ASSAY W/OPTIC: CPT | Mod: PO

## 2018-12-06 PROCEDURE — 99999 PR PBB SHADOW E&M-EST. PATIENT-LVL IV: CPT | Mod: PBBFAC,,, | Performed by: INTERNAL MEDICINE

## 2018-12-06 PROCEDURE — 3074F SYST BP LT 130 MM HG: CPT | Mod: CPTII,S$GLB,, | Performed by: INTERNAL MEDICINE

## 2018-12-06 PROCEDURE — 87081 CULTURE SCREEN ONLY: CPT

## 2018-12-06 NOTE — PROGRESS NOTES
CC: sore throat  HPI:  The patient is a 33 y.o. year old female who presents to the office for sore throat.  Her symptoms started Tuesday.  She reports muscle aches and subjective fever.  She complains of postnasal drip and mild ear pain.  She had difficulty swallowing yesterday.      PAST MEDICAL HISTORY:  Past Medical History:   Diagnosis Date    Fibroadenoma of right breast     Menarche     Age of onset 13    Migraine     Severe preeclampsia 2017       SURGICAL HISTORY:  Past Surgical History:   Procedure Laterality Date    BREAST BIOPSY      fibroadenoma     SECTION      for NRFHTs, induction for Severe PreEclampsia    DELIVERY- SECTION N/A 2017    [facility] at [facility]D    MYOMECTOMY  2017    MYOMECTOMY- OPEN N/A 2017    Performed by [facility] at [facility] OR    WISDOM TOOTH EXTRACTION         MEDS:  Medcard reviewed and updated    ALLERGIES: Allergy Card reviewed and updated    SOCIAL HISTORY:   The patient is a nonsmoker.    PE:   APPEARANCE: Well nourished, well developed, in no acute distress    EARS: TM's intact. No retraction or perforation.    NOSE: Mucosa pink. Airway clear.  MOUTH & THROAT: No tonsillar enlargement. Positive pharyngeal erythema and exudate.   CHEST: Lungs clear to auscultation with unlabored respirations.  CARDIOVASCULAR: Normal S1, S2. No murmurs. No carotid bruits. No pedal edema.  ABDOMEN: Bowel sounds normal. Not distended. Soft. No tenderness or masses.   PSYCHIATRIC: The patient is oriented to person, place, and time and has a pleasant affect.        ASSESSMENT/PLAN:  Olga was seen today for sore throat.    Diagnoses and all orders for this visit:    Acute pharyngitis, unspecified etiology  -     THROAT SCREEN, RAPID

## 2018-12-08 LAB — BACTERIA THROAT CULT: NORMAL

## 2018-12-13 ENCOUNTER — PROCEDURE VISIT (OUTPATIENT)
Dept: OBSTETRICS AND GYNECOLOGY | Facility: CLINIC | Age: 33
End: 2018-12-13
Attending: OBSTETRICS & GYNECOLOGY
Payer: COMMERCIAL

## 2018-12-13 ENCOUNTER — LAB VISIT (OUTPATIENT)
Dept: LAB | Facility: OTHER | Age: 33
End: 2018-12-13
Payer: COMMERCIAL

## 2018-12-13 VITALS — SYSTOLIC BLOOD PRESSURE: 110 MMHG | BODY MASS INDEX: 22.2 KG/M2 | WEIGHT: 121.38 LBS | DIASTOLIC BLOOD PRESSURE: 70 MMHG

## 2018-12-13 DIAGNOSIS — Z87.59 HISTORY OF PRE-ECLAMPSIA: ICD-10-CM

## 2018-12-13 DIAGNOSIS — Z34.81 ENCOUNTER FOR SUPERVISION OF OTHER NORMAL PREGNANCY IN FIRST TRIMESTER: Primary | ICD-10-CM

## 2018-12-13 DIAGNOSIS — Z36.82 ENCOUNTER FOR ANTENATAL SCREENING FOR NUCHAL TRANSLUCENCY: ICD-10-CM

## 2018-12-13 DIAGNOSIS — Z3A.08 8 WEEKS GESTATION OF PREGNANCY: ICD-10-CM

## 2018-12-13 DIAGNOSIS — N92.6 MISSED MENSES: ICD-10-CM

## 2018-12-13 LAB
ABO + RH BLD: NORMAL
BASOPHILS # BLD AUTO: 0.02 K/UL
BASOPHILS NFR BLD: 0.2 %
BLD GP AB SCN CELLS X3 SERPL QL: NORMAL
DIFFERENTIAL METHOD: ABNORMAL
EOSINOPHIL # BLD AUTO: 0.1 K/UL
EOSINOPHIL NFR BLD: 0.9 %
ERYTHROCYTE [DISTWIDTH] IN BLOOD BY AUTOMATED COUNT: 13.9 %
GLUCOSE SERPL-MCNC: 67 MG/DL
HCT VFR BLD AUTO: 37.3 %
HGB BLD-MCNC: 12.4 G/DL
LYMPHOCYTES # BLD AUTO: 1.6 K/UL
LYMPHOCYTES NFR BLD: 17.4 %
MCH RBC QN AUTO: 27.4 PG
MCHC RBC AUTO-ENTMCNC: 33.2 G/DL
MCV RBC AUTO: 83 FL
MONOCYTES # BLD AUTO: 0.7 K/UL
MONOCYTES NFR BLD: 7.9 %
NEUTROPHILS # BLD AUTO: 6.5 K/UL
NEUTROPHILS NFR BLD: 73.3 %
PLATELET # BLD AUTO: 259 K/UL
PMV BLD AUTO: 10.1 FL
RBC # BLD AUTO: 4.52 M/UL
RPR SER QL: NORMAL
TSH SERPL DL<=0.005 MIU/L-ACNC: 0.58 UIU/ML
WBC # BLD AUTO: 8.94 K/UL

## 2018-12-13 PROCEDURE — 82947 ASSAY GLUCOSE BLOOD QUANT: CPT

## 2018-12-13 PROCEDURE — 86762 RUBELLA ANTIBODY: CPT

## 2018-12-13 PROCEDURE — 76813 OB US NUCHAL MEAS 1 GEST: CPT | Mod: S$GLB,,, | Performed by: OBSTETRICS & GYNECOLOGY

## 2018-12-13 PROCEDURE — 99999 PR PBB SHADOW E&M-EST. PATIENT-LVL II: CPT | Mod: PBBFAC,,, | Performed by: OBSTETRICS & GYNECOLOGY

## 2018-12-13 PROCEDURE — 86901 BLOOD TYPING SEROLOGIC RH(D): CPT

## 2018-12-13 PROCEDURE — 85025 COMPLETE CBC W/AUTO DIFF WBC: CPT

## 2018-12-13 PROCEDURE — 87340 HEPATITIS B SURFACE AG IA: CPT

## 2018-12-13 PROCEDURE — 86592 SYPHILIS TEST NON-TREP QUAL: CPT

## 2018-12-13 PROCEDURE — 86703 HIV-1/HIV-2 1 RESULT ANTBDY: CPT

## 2018-12-13 PROCEDURE — 84443 ASSAY THYROID STIM HORMONE: CPT

## 2018-12-13 PROCEDURE — 0502F SUBSEQUENT PRENATAL CARE: CPT | Mod: S$GLB,,, | Performed by: OBSTETRICS & GYNECOLOGY

## 2018-12-13 RX ORDER — GUAIFENESIN 600 MG/1
1200 TABLET, EXTENDED RELEASE ORAL 2 TIMES DAILY
COMMUNITY
End: 2019-01-03

## 2018-12-13 RX ORDER — AZITHROMYCIN 250 MG/1
TABLET, FILM COATED ORAL
Qty: 6 TABLET | Refills: 0 | Status: SHIPPED | OUTPATIENT
Start: 2018-12-13 | End: 2019-01-03

## 2018-12-13 RX ORDER — LORATADINE 10 MG/1
10 TABLET ORAL DAILY
COMMUNITY
End: 2019-01-03

## 2018-12-13 NOTE — PROGRESS NOTES
NT today, normal.  Serum integrated.  Start baby aspirin in one week.  Having sore throat over a week, strep cx was negative. No fever.  Send in zpack

## 2018-12-14 LAB
HBV SURFACE AG SERPL QL IA: NEGATIVE
HIV 1+2 AB+HIV1 P24 AG SERPL QL IA: NEGATIVE
RUBV IGG SER-ACNC: 58.9 IU/ML
RUBV IGG SER-IMP: REACTIVE

## 2018-12-14 NOTE — PROGRESS NOTES
Dereck Espinoza,   All of your Initial ob labs look good so far.  We will see you soon !  PRATIMA Fried

## 2018-12-26 ENCOUNTER — PATIENT MESSAGE (OUTPATIENT)
Dept: ADMINISTRATIVE | Facility: OTHER | Age: 33
End: 2018-12-26

## 2019-01-03 ENCOUNTER — LAB VISIT (OUTPATIENT)
Dept: LAB | Facility: HOSPITAL | Age: 34
End: 2019-01-03
Attending: NURSE PRACTITIONER
Payer: COMMERCIAL

## 2019-01-03 ENCOUNTER — ROUTINE PRENATAL (OUTPATIENT)
Dept: OBSTETRICS AND GYNECOLOGY | Facility: CLINIC | Age: 34
End: 2019-01-03
Payer: COMMERCIAL

## 2019-01-03 VITALS — SYSTOLIC BLOOD PRESSURE: 98 MMHG | WEIGHT: 124.13 LBS | BODY MASS INDEX: 22.7 KG/M2 | DIASTOLIC BLOOD PRESSURE: 60 MMHG

## 2019-01-03 DIAGNOSIS — Z34.82 ENCOUNTER FOR SUPERVISION OF OTHER NORMAL PREGNANCY IN SECOND TRIMESTER: ICD-10-CM

## 2019-01-03 DIAGNOSIS — Z3A.15 15 WEEKS GESTATION OF PREGNANCY: ICD-10-CM

## 2019-01-03 DIAGNOSIS — Z3A.15 15 WEEKS GESTATION OF PREGNANCY: Primary | ICD-10-CM

## 2019-01-03 DIAGNOSIS — Z87.59 HISTORY OF PRE-ECLAMPSIA: ICD-10-CM

## 2019-01-03 PROCEDURE — 0502F SUBSEQUENT PRENATAL CARE: CPT | Mod: S$GLB,,, | Performed by: NURSE PRACTITIONER

## 2019-01-03 PROCEDURE — 99999 PR PBB SHADOW E&M-EST. PATIENT-LVL II: ICD-10-PCS | Mod: PBBFAC,,, | Performed by: NURSE PRACTITIONER

## 2019-01-03 PROCEDURE — 99999 PR PBB SHADOW E&M-EST. PATIENT-LVL II: CPT | Mod: PBBFAC,,, | Performed by: NURSE PRACTITIONER

## 2019-01-03 PROCEDURE — 81511 FTL CGEN ABNOR FOUR ANAL: CPT

## 2019-01-03 PROCEDURE — 0502F PR SUBSEQUENT PRENATAL CARE: ICD-10-PCS | Mod: S$GLB,,, | Performed by: NURSE PRACTITIONER

## 2019-01-03 RX ORDER — NAPROXEN SODIUM 220 MG/1
81 TABLET, FILM COATED ORAL DAILY
Status: ON HOLD | COMMUNITY
End: 2019-06-09 | Stop reason: HOSPADM

## 2019-01-03 NOTE — PROGRESS NOTES
Doing well; no complaints.  All questions answered.    Bleeding/cramping precautions given.  Taking baby ASA daily.  Will have 2nd part of Sequential screen done today.  Anatomy scan order placed.

## 2019-01-04 ENCOUNTER — PATIENT OUTREACH (OUTPATIENT)
Dept: OTHER | Facility: OTHER | Age: 34
End: 2019-01-04

## 2019-01-04 NOTE — TELEPHONE ENCOUNTER
Initial introduction with Ms. Olga Prater completed and the role of the health coaches for Connected MOM was explained.     Reviewed the importance of taking weights and blood pressure readings, using the health  as a resource for physical activity and dietary habits, and that [facility] messages will be sent for weeks 20, 30, and 37 home urine tests.  Reviewed that the patient should contact her OB team with any specific questions regarding her pregnancy, and to contact [facility] or 911 in the case of a medical emergency.

## 2019-01-07 LAB
# FETUSES US: NORMAL
AFP MOM SERPL: 0.88
AFP SERPL-MCNC: 32.6 NG/ML
AGE AT DELIVERY: 32
B-HCG MOM SERPL: 0.46
B-HCG SERPL-ACNC: 17.5 IU/ML
COLLECT DATE BLD: NORMAL
COLLECT DATE: NORMAL
FET NASAL BONE LENGTH US.MEAS: NORMAL MM
FET NUCHAL FOLD MOM THICKNESS US.MEAS: 1.03
FET NUCHAL FOLD THICKNESS US.MEAS: 1.6 MM
FET TS 21 RISK FROM MAT AGE: NORMAL
GA (DAYS): 2 D
GA (WEEKS): 13 WK
GA METHOD: NORMAL
GEST. AGE (DAYS) 2ND SAMPLE (SS2): 2
GEST. AGE (WKS) 2ND SAMPLE (SS2): 16
IDDM PATIENT QL: NORMAL
INHIBIN A MOM SERPL: 0.9
INHIBIN A SERPL-MCNC: 162.1 PG/ML
INTEGRATED SCN PATIENT-IMP: NEGATIVE
PAPP-A MOM SERPL: 0.81
PAPP-A SERPL-MCNC: NORMAL NG/ML
SEQUENTIAL SCREEN PART 2 INTERP: NORMAL
TS 18 RISK FETUS: NORMAL
TS 21 RISK FETUS: NORMAL
U ESTRIOL MOM SERPL: 0.94
U ESTRIOL SERPL-MCNC: 0.83 NG/ML

## 2019-01-31 ENCOUNTER — ROUTINE PRENATAL (OUTPATIENT)
Dept: OBSTETRICS AND GYNECOLOGY | Facility: CLINIC | Age: 34
End: 2019-01-31
Attending: OBSTETRICS & GYNECOLOGY
Payer: COMMERCIAL

## 2019-01-31 ENCOUNTER — PROCEDURE VISIT (OUTPATIENT)
Dept: MATERNAL FETAL MEDICINE | Facility: CLINIC | Age: 34
End: 2019-01-31
Payer: COMMERCIAL

## 2019-01-31 VITALS
SYSTOLIC BLOOD PRESSURE: 110 MMHG | WEIGHT: 129.75 LBS | BODY MASS INDEX: 23.73 KG/M2 | DIASTOLIC BLOOD PRESSURE: 68 MMHG

## 2019-01-31 DIAGNOSIS — Z3A.15 15 WEEKS GESTATION OF PREGNANCY: ICD-10-CM

## 2019-01-31 DIAGNOSIS — Z34.80 SUPERVISION OF OTHER NORMAL PREGNANCY, ANTEPARTUM: Primary | ICD-10-CM

## 2019-01-31 DIAGNOSIS — Z87.59 HISTORY OF PRE-ECLAMPSIA: ICD-10-CM

## 2019-01-31 DIAGNOSIS — Z34.82 ENCOUNTER FOR SUPERVISION OF OTHER NORMAL PREGNANCY IN SECOND TRIMESTER: ICD-10-CM

## 2019-01-31 PROCEDURE — 76805 PR US, OB 14+WKS, TRANSABD, SINGLE GESTATION: ICD-10-PCS | Mod: S$GLB,,, | Performed by: OBSTETRICS & GYNECOLOGY

## 2019-01-31 PROCEDURE — 0502F PR SUBSEQUENT PRENATAL CARE: ICD-10-PCS | Mod: S$GLB,,, | Performed by: OBSTETRICS & GYNECOLOGY

## 2019-01-31 PROCEDURE — 99999 PR PBB SHADOW E&M-EST. PATIENT-LVL II: ICD-10-PCS | Mod: PBBFAC,,, | Performed by: OBSTETRICS & GYNECOLOGY

## 2019-01-31 PROCEDURE — 99499 UNLISTED E&M SERVICE: CPT | Mod: S$GLB,,, | Performed by: OBSTETRICS & GYNECOLOGY

## 2019-01-31 PROCEDURE — 0502F SUBSEQUENT PRENATAL CARE: CPT | Mod: S$GLB,,, | Performed by: OBSTETRICS & GYNECOLOGY

## 2019-01-31 PROCEDURE — 99999 PR PBB SHADOW E&M-EST. PATIENT-LVL II: CPT | Mod: PBBFAC,,, | Performed by: OBSTETRICS & GYNECOLOGY

## 2019-01-31 PROCEDURE — 99499 NO LOS: ICD-10-PCS | Mod: S$GLB,,, | Performed by: OBSTETRICS & GYNECOLOGY

## 2019-01-31 PROCEDURE — 76805 OB US >/= 14 WKS SNGL FETUS: CPT | Mod: S$GLB,,, | Performed by: OBSTETRICS & GYNECOLOGY

## 2019-02-07 ENCOUNTER — PATIENT OUTREACH (OUTPATIENT)
Dept: OTHER | Facility: OTHER | Age: 34
End: 2019-02-07

## 2019-02-07 NOTE — TELEPHONE ENCOUNTER
Reminded patient that week 20 home urine test is due for [facility]. Asked that she check [facility] messages for instructions and link to submit results.

## 2019-03-12 ENCOUNTER — LAB VISIT (OUTPATIENT)
Dept: LAB | Facility: HOSPITAL | Age: 34
End: 2019-03-12
Attending: OBSTETRICS & GYNECOLOGY
Payer: COMMERCIAL

## 2019-03-12 ENCOUNTER — ROUTINE PRENATAL (OUTPATIENT)
Dept: OBSTETRICS AND GYNECOLOGY | Facility: CLINIC | Age: 34
End: 2019-03-12
Payer: COMMERCIAL

## 2019-03-12 VITALS
BODY MASS INDEX: 24.72 KG/M2 | SYSTOLIC BLOOD PRESSURE: 110 MMHG | DIASTOLIC BLOOD PRESSURE: 60 MMHG | WEIGHT: 135.13 LBS

## 2019-03-12 DIAGNOSIS — Z3A.25 25 WEEKS GESTATION OF PREGNANCY: Primary | ICD-10-CM

## 2019-03-12 DIAGNOSIS — Z34.82 ENCOUNTER FOR SUPERVISION OF OTHER NORMAL PREGNANCY, SECOND TRIMESTER: ICD-10-CM

## 2019-03-12 DIAGNOSIS — Z3A.25 25 WEEKS GESTATION OF PREGNANCY: ICD-10-CM

## 2019-03-12 DIAGNOSIS — Z87.59 HISTORY OF PRE-ECLAMPSIA: ICD-10-CM

## 2019-03-12 LAB
BASOPHILS # BLD AUTO: 0.04 K/UL
BASOPHILS NFR BLD: 0.4 %
DIFFERENTIAL METHOD: ABNORMAL
EOSINOPHIL # BLD AUTO: 0 K/UL
EOSINOPHIL NFR BLD: 0.4 %
ERYTHROCYTE [DISTWIDTH] IN BLOOD BY AUTOMATED COUNT: 12.8 %
GLUCOSE SERPL-MCNC: 109 MG/DL
HCT VFR BLD AUTO: 34.5 %
HGB BLD-MCNC: 10.7 G/DL
IMM GRANULOCYTES # BLD AUTO: 0.05 K/UL
IMM GRANULOCYTES NFR BLD AUTO: 0.5 %
LYMPHOCYTES # BLD AUTO: 0.8 K/UL
LYMPHOCYTES NFR BLD: 8 %
MCH RBC QN AUTO: 27 PG
MCHC RBC AUTO-ENTMCNC: 31 G/DL
MCV RBC AUTO: 87 FL
MONOCYTES # BLD AUTO: 0.6 K/UL
MONOCYTES NFR BLD: 6.1 %
NEUTROPHILS # BLD AUTO: 8.9 K/UL
NEUTROPHILS NFR BLD: 84.6 %
NRBC BLD-RTO: 0 /100 WBC
PLATELET # BLD AUTO: 205 K/UL
PMV BLD AUTO: 11.9 FL
RBC # BLD AUTO: 3.97 M/UL
WBC # BLD AUTO: 10.46 K/UL

## 2019-03-12 PROCEDURE — 85025 COMPLETE CBC W/AUTO DIFF WBC: CPT

## 2019-03-12 PROCEDURE — 99999 PR PBB SHADOW E&M-EST. PATIENT-LVL II: CPT | Mod: PBBFAC,,, | Performed by: NURSE PRACTITIONER

## 2019-03-12 PROCEDURE — 0502F SUBSEQUENT PRENATAL CARE: CPT | Mod: CPTII,S$GLB,, | Performed by: NURSE PRACTITIONER

## 2019-03-12 PROCEDURE — 82950 GLUCOSE TEST: CPT

## 2019-03-12 PROCEDURE — 0502F PR SUBSEQUENT PRENATAL CARE: ICD-10-PCS | Mod: CPTII,S$GLB,, | Performed by: NURSE PRACTITIONER

## 2019-03-12 PROCEDURE — 99999 PR PBB SHADOW E&M-EST. PATIENT-LVL II: ICD-10-PCS | Mod: PBBFAC,,, | Performed by: NURSE PRACTITIONER

## 2019-04-12 ENCOUNTER — TELEPHONE (OUTPATIENT)
Dept: OBSTETRICS AND GYNECOLOGY | Facility: CLINIC | Age: 34
End: 2019-04-12

## 2019-04-12 ENCOUNTER — ROUTINE PRENATAL (OUTPATIENT)
Dept: OBSTETRICS AND GYNECOLOGY | Facility: CLINIC | Age: 34
End: 2019-04-12
Payer: COMMERCIAL

## 2019-04-12 ENCOUNTER — CLINICAL SUPPORT (OUTPATIENT)
Dept: OBSTETRICS AND GYNECOLOGY | Facility: CLINIC | Age: 34
End: 2019-04-12
Payer: COMMERCIAL

## 2019-04-12 VITALS — DIASTOLIC BLOOD PRESSURE: 74 MMHG | WEIGHT: 138 LBS | BODY MASS INDEX: 25.24 KG/M2 | SYSTOLIC BLOOD PRESSURE: 122 MMHG

## 2019-04-12 DIAGNOSIS — Z98.890 H/O MYOMECTOMY: ICD-10-CM

## 2019-04-12 DIAGNOSIS — Z3A.32 32 WEEKS GESTATION OF PREGNANCY: Primary | ICD-10-CM

## 2019-04-12 DIAGNOSIS — Z98.891 PREVIOUS CESAREAN SECTION: Primary | ICD-10-CM

## 2019-04-12 DIAGNOSIS — Z23 NEED FOR TDAP VACCINATION: Primary | ICD-10-CM

## 2019-04-12 DIAGNOSIS — Z23 NEED FOR TDAP VACCINATION: ICD-10-CM

## 2019-04-12 PROCEDURE — 0502F PR SUBSEQUENT PRENATAL CARE: ICD-10-PCS | Mod: CPTII,S$GLB,, | Performed by: OBSTETRICS & GYNECOLOGY

## 2019-04-12 PROCEDURE — 90715 TDAP VACCINE GREATER THAN OR EQUAL TO 7YO IM: ICD-10-PCS | Mod: S$GLB,,, | Performed by: OBSTETRICS & GYNECOLOGY

## 2019-04-12 PROCEDURE — 99999 PR PBB SHADOW E&M-EST. PATIENT-LVL I: CPT | Mod: PBBFAC,,,

## 2019-04-12 PROCEDURE — 90471 TDAP VACCINE GREATER THAN OR EQUAL TO 7YO IM: ICD-10-PCS | Mod: S$GLB,,, | Performed by: OBSTETRICS & GYNECOLOGY

## 2019-04-12 PROCEDURE — 99999 PR PBB SHADOW E&M-EST. PATIENT-LVL II: ICD-10-PCS | Mod: PBBFAC,,, | Performed by: OBSTETRICS & GYNECOLOGY

## 2019-04-12 PROCEDURE — 0502F SUBSEQUENT PRENATAL CARE: CPT | Mod: CPTII,S$GLB,, | Performed by: OBSTETRICS & GYNECOLOGY

## 2019-04-12 PROCEDURE — 90471 IMMUNIZATION ADMIN: CPT | Mod: S$GLB,,, | Performed by: OBSTETRICS & GYNECOLOGY

## 2019-04-12 PROCEDURE — 90715 TDAP VACCINE 7 YRS/> IM: CPT | Mod: S$GLB,,, | Performed by: OBSTETRICS & GYNECOLOGY

## 2019-04-12 PROCEDURE — 99999 PR PBB SHADOW E&M-EST. PATIENT-LVL II: CPT | Mod: PBBFAC,,, | Performed by: OBSTETRICS & GYNECOLOGY

## 2019-04-12 PROCEDURE — 99999 PR PBB SHADOW E&M-EST. PATIENT-LVL I: ICD-10-PCS | Mod: PBBFAC,,,

## 2019-04-12 NOTE — PROGRESS NOTES
Ordering Provider: Dr. Pastrana    During visit today patient received an injection of Tdap to left deltoid.  Tolerated well.  Instructed pt to remain 15 minutes after injection to monitor for reactions.      Pre pain scale: none    Post pain scale: none

## 2019-04-23 ENCOUNTER — ROUTINE PRENATAL (OUTPATIENT)
Dept: OBSTETRICS AND GYNECOLOGY | Facility: CLINIC | Age: 34
End: 2019-04-23
Payer: COMMERCIAL

## 2019-04-23 VITALS — WEIGHT: 140 LBS | SYSTOLIC BLOOD PRESSURE: 120 MMHG | BODY MASS INDEX: 25.6 KG/M2 | DIASTOLIC BLOOD PRESSURE: 70 MMHG

## 2019-04-23 DIAGNOSIS — Z3A.31 31 WEEKS GESTATION OF PREGNANCY: Primary | ICD-10-CM

## 2019-04-23 PROCEDURE — 99999 PR PBB SHADOW E&M-EST. PATIENT-LVL II: CPT | Mod: PBBFAC,,, | Performed by: OBSTETRICS & GYNECOLOGY

## 2019-04-23 PROCEDURE — 99999 PR PBB SHADOW E&M-EST. PATIENT-LVL II: ICD-10-PCS | Mod: PBBFAC,,, | Performed by: OBSTETRICS & GYNECOLOGY

## 2019-04-23 PROCEDURE — 0502F PR SUBSEQUENT PRENATAL CARE: ICD-10-PCS | Mod: CPTII,S$GLB,, | Performed by: OBSTETRICS & GYNECOLOGY

## 2019-04-23 PROCEDURE — 0502F SUBSEQUENT PRENATAL CARE: CPT | Mod: CPTII,S$GLB,, | Performed by: OBSTETRICS & GYNECOLOGY

## 2019-04-23 NOTE — PROGRESS NOTES
Had episode of back pain one time, no LOF no bleeding no ctx.  Resolved and not had it in one week. Reassurance.

## 2019-05-07 ENCOUNTER — ROUTINE PRENATAL (OUTPATIENT)
Dept: OBSTETRICS AND GYNECOLOGY | Facility: CLINIC | Age: 34
End: 2019-05-07
Payer: COMMERCIAL

## 2019-05-07 VITALS
SYSTOLIC BLOOD PRESSURE: 120 MMHG | BODY MASS INDEX: 25.81 KG/M2 | WEIGHT: 141.13 LBS | DIASTOLIC BLOOD PRESSURE: 70 MMHG

## 2019-05-07 DIAGNOSIS — R82.998 LEUKOCYTES IN URINE: ICD-10-CM

## 2019-05-07 DIAGNOSIS — Z3A.33 33 WEEKS GESTATION OF PREGNANCY: Primary | ICD-10-CM

## 2019-05-07 PROCEDURE — 99999 PR PBB SHADOW E&M-EST. PATIENT-LVL II: CPT | Mod: PBBFAC,,, | Performed by: OBSTETRICS & GYNECOLOGY

## 2019-05-07 PROCEDURE — 0502F PR SUBSEQUENT PRENATAL CARE: ICD-10-PCS | Mod: CPTII,S$GLB,, | Performed by: OBSTETRICS & GYNECOLOGY

## 2019-05-07 PROCEDURE — 87086 URINE CULTURE/COLONY COUNT: CPT

## 2019-05-07 PROCEDURE — 99999 PR PBB SHADOW E&M-EST. PATIENT-LVL II: ICD-10-PCS | Mod: PBBFAC,,, | Performed by: OBSTETRICS & GYNECOLOGY

## 2019-05-07 PROCEDURE — 0502F SUBSEQUENT PRENATAL CARE: CPT | Mod: CPTII,S$GLB,, | Performed by: OBSTETRICS & GYNECOLOGY

## 2019-05-09 LAB
BACTERIA UR CULT: NORMAL
BACTERIA UR CULT: NORMAL

## 2019-05-21 ENCOUNTER — ROUTINE PRENATAL (OUTPATIENT)
Dept: OBSTETRICS AND GYNECOLOGY | Facility: CLINIC | Age: 34
End: 2019-05-21
Payer: COMMERCIAL

## 2019-05-21 VITALS
DIASTOLIC BLOOD PRESSURE: 80 MMHG | BODY MASS INDEX: 26.01 KG/M2 | SYSTOLIC BLOOD PRESSURE: 120 MMHG | WEIGHT: 142.19 LBS

## 2019-05-21 DIAGNOSIS — Z98.890 STATUS POST MYOMECTOMY: ICD-10-CM

## 2019-05-21 DIAGNOSIS — Z3A.37 37 WEEKS GESTATION OF PREGNANCY: ICD-10-CM

## 2019-05-21 DIAGNOSIS — Z3A.35 35 WEEKS GESTATION OF PREGNANCY: Primary | ICD-10-CM

## 2019-05-21 PROCEDURE — 99999 PR PBB SHADOW E&M-EST. PATIENT-LVL II: CPT | Mod: PBBFAC,,, | Performed by: OBSTETRICS & GYNECOLOGY

## 2019-05-21 PROCEDURE — 99999 PR PBB SHADOW E&M-EST. PATIENT-LVL II: ICD-10-PCS | Mod: PBBFAC,,, | Performed by: OBSTETRICS & GYNECOLOGY

## 2019-05-21 PROCEDURE — 0502F PR SUBSEQUENT PRENATAL CARE: ICD-10-PCS | Mod: CPTII,S$GLB,, | Performed by: OBSTETRICS & GYNECOLOGY

## 2019-05-21 PROCEDURE — 0502F SUBSEQUENT PRENATAL CARE: CPT | Mod: CPTII,S$GLB,, | Performed by: OBSTETRICS & GYNECOLOGY

## 2019-05-21 PROCEDURE — 87081 CULTURE SCREEN ONLY: CPT

## 2019-05-21 RX ORDER — MISOPROSTOL 100 UG/1
800 TABLET ORAL
Status: CANCELLED | OUTPATIENT
Start: 2019-05-21

## 2019-05-21 RX ORDER — SODIUM CITRATE AND CITRIC ACID MONOHYDRATE 334; 500 MG/5ML; MG/5ML
30 SOLUTION ORAL
Status: CANCELLED | OUTPATIENT
Start: 2019-05-21

## 2019-05-21 RX ORDER — MUPIROCIN 20 MG/G
OINTMENT TOPICAL
Status: CANCELLED | OUTPATIENT
Start: 2019-05-21

## 2019-05-21 RX ORDER — SODIUM CHLORIDE, SODIUM LACTATE, POTASSIUM CHLORIDE, CALCIUM CHLORIDE 600; 310; 30; 20 MG/100ML; MG/100ML; MG/100ML; MG/100ML
INJECTION, SOLUTION INTRAVENOUS CONTINUOUS
Status: CANCELLED | OUTPATIENT
Start: 2019-05-21

## 2019-05-21 NOTE — H&P
[facility] [facility]  History & Physical  Obstetrics      SUBJECTIVE:     Chief Complaint/Reason for Admission: Csection    History of Present Illness:  Olga Prater is a 33 y.o.  female with an Estimated Date of Delivery: 19 admitted for .  Her current obstetrical history is complicated by prior open myomectomy and prior delivery complicated by uterine fibroids, LTCS and PreEclampsia.        (Not in a hospital admission)    Review of patient's allergies indicates:  No Known Allergies     Past Medical History:   Diagnosis Date    Fibroadenoma of right breast     Menarche     Age of onset 13    Migraine     Severe preeclampsia 2017     Past Surgical History:   Procedure Laterality Date    BREAST BIOPSY      fibroadenoma     SECTION      for NRFHTs, induction for Severe PreEclampsia    DELIVERY- SECTION N/A 2017    Performed by Didi Ortega DO at [facility] L&D    MYOMECTOMY  2017    MYOMECTOMY- OPEN N/A 2017    Performed by Mohan Pastrana MD at [facility] OR    WISDOM TOOTH EXTRACTION       Family History   Problem Relation Age of Onset    Diabetes Father         Pre    Hypertension Father     Skin cancer Father     Cancer Father     Thyroid disease Mother     Hypertension Sister     Breast cancer Maternal Grandmother     Cancer Maternal Grandmother     Breast cancer Maternal Aunt     Colon cancer Neg Hx     Ovarian cancer Neg Hx     Melanoma Neg Hx      Social History     Tobacco Use    Smoking status: Never Smoker    Smokeless tobacco: Never Used   Substance Use Topics    Alcohol use: Yes     Comment: social     Drug use: No       Review of Systems  Constitutional: no fever or chills  Eyes: no visual changes  Respiratory: no cough or shortness of breath  Cardiovascular: no chest pain or palpitations  Gastrointestinal: no nausea or vomiting, tolerating diet  Genitourinary: no hematuria or dysuria     OBJECTIVE:     Vital Signs  (Most Recent):  BP: 120/80 (19 0929)    Physical Exam:  General:  alert and normal appearing gravid female   Skin:  Skin color, texture, turgor normal. No rashes or lesions   HEENT:  conjunctivae/corneas clear. PERRL.   Lungs:  clear to auscultation bilaterally   Heart:  regular rate and rhythm, S1, S2 normal, no murmur, click, rub or gallop   Breasts:     Abdomen:  soft, non-tender; bowel sounds normal   Uterine Size:     Presentations:  cephalic   FHT:  Reactive   Pelvis:    Cervix:     Dilation:     Effacement:     Station:      Consistency:     Position:      Laboratory:  Lab Results   Component Value Date    GROUPTRH O POS 2018    HEPBSAG Negative 2018        Diagnostic Results:      ASSESSMENT/PLAN:     37 wks 5d gestation.     Conditions: prior open myomectomy and prior delivery complicated by uterine fibroids, LTCS and PreEclampsia.         Risks, benefits, alternatives and possible complications have been discussed in detail with the patient.  Pre-admission, admission, and post admission procedures and expectations were discussed in detail.  All questions answered, all appropriate consents will be signed at the Hospital. Admit

## 2019-05-23 ENCOUNTER — TELEPHONE (OUTPATIENT)
Dept: OBSTETRICS AND GYNECOLOGY | Facility: CLINIC | Age: 34
End: 2019-05-23

## 2019-05-23 NOTE — TELEPHONE ENCOUNTER
Dr Pastrana pt calling, ob 35wks states she needs a order for a Breast pump. Pt is working at [facility] [employer] today if she needs to  a paper.Pt # work [phone] or [phone]

## 2019-05-24 LAB — BACTERIA SPEC AEROBE CULT: NORMAL

## 2019-05-30 ENCOUNTER — CLINICAL SUPPORT (OUTPATIENT)
Dept: OBSTETRICS AND GYNECOLOGY | Facility: CLINIC | Age: 34
End: 2019-05-30
Payer: COMMERCIAL

## 2019-05-30 DIAGNOSIS — Z36.86 SCREENING, ANTENATAL, FOR RISK OF PRE-TERM LABOR: Primary | ICD-10-CM

## 2019-05-30 PROCEDURE — 96372 THER/PROPH/DIAG INJ SC/IM: CPT | Mod: S$GLB,,, | Performed by: OBSTETRICS & GYNECOLOGY

## 2019-05-30 PROCEDURE — 99999 PR PBB SHADOW E&M-EST. PATIENT-LVL I: ICD-10-PCS | Mod: PBBFAC,,,

## 2019-05-30 PROCEDURE — 96372 PR INJECTION,THERAP/PROPH/DIAG2ST, IM OR SUBCUT: ICD-10-PCS | Mod: S$GLB,,, | Performed by: OBSTETRICS & GYNECOLOGY

## 2019-05-30 PROCEDURE — 99999 PR PBB SHADOW E&M-EST. PATIENT-LVL I: CPT | Mod: PBBFAC,,,

## 2019-05-30 RX ORDER — BETAMETHASONE SODIUM PHOSPHATE AND BETAMETHASONE ACETATE 3; 3 MG/ML; MG/ML
12 INJECTION, SUSPENSION INTRA-ARTICULAR; INTRALESIONAL; INTRAMUSCULAR; SOFT TISSUE DAILY
Status: COMPLETED | OUTPATIENT
Start: 2019-05-30 | End: 2019-05-31

## 2019-05-30 RX ORDER — BETAMETHASONE SODIUM PHOSPHATE AND BETAMETHASONE ACETATE 3; 3 MG/ML; MG/ML
6 INJECTION, SUSPENSION INTRA-ARTICULAR; INTRALESIONAL; INTRAMUSCULAR; SOFT TISSUE
Status: DISCONTINUED | OUTPATIENT
Start: 2019-05-30 | End: 2019-05-30

## 2019-05-30 RX ADMIN — BETAMETHASONE SODIUM PHOSPHATE AND BETAMETHASONE ACETATE 12 MG: 3; 3 INJECTION, SUSPENSION INTRA-ARTICULAR; INTRALESIONAL; INTRAMUSCULAR; SOFT TISSUE at 10:05

## 2019-05-30 NOTE — PROGRESS NOTES
Ordering Physician: Dr. Pastrana    Order Type: Verbal     During visit today patient received injection of Betamethasone to left glut. Patient tolerated well, no allergic reaction noted. Requested patient to remain 10 minutes after injection.     Pre-Pain Scale:None     Post Pain Scale:None

## 2019-05-31 ENCOUNTER — CLINICAL SUPPORT (OUTPATIENT)
Dept: OBSTETRICS AND GYNECOLOGY | Facility: CLINIC | Age: 34
End: 2019-05-31
Payer: COMMERCIAL

## 2019-05-31 PROCEDURE — 96372 PR INJECTION,THERAP/PROPH/DIAG2ST, IM OR SUBCUT: ICD-10-PCS | Mod: S$GLB,,, | Performed by: OBSTETRICS & GYNECOLOGY

## 2019-05-31 PROCEDURE — 96372 THER/PROPH/DIAG INJ SC/IM: CPT | Mod: S$GLB,,, | Performed by: OBSTETRICS & GYNECOLOGY

## 2019-05-31 RX ADMIN — BETAMETHASONE SODIUM PHOSPHATE AND BETAMETHASONE ACETATE 12 MG: 3; 3 INJECTION, SUSPENSION INTRA-ARTICULAR; INTRALESIONAL; INTRAMUSCULAR; SOFT TISSUE at 09:05

## 2019-05-31 NOTE — PROGRESS NOTES
Ordering Provider: Dr. Pastrana     During visit today patient received an injection of celestone to left ventrogluteal.  Tolerated well.  Instructed pt to remain 15 minutes after injection to monitor for reactions.      Pre pain scale: none    Post pain scale: none

## 2019-06-06 ENCOUNTER — ANESTHESIA EVENT (OUTPATIENT)
Dept: OBSTETRICS AND GYNECOLOGY | Facility: OTHER | Age: 34
End: 2019-06-06
Payer: COMMERCIAL

## 2019-06-06 ENCOUNTER — ANESTHESIA (OUTPATIENT)
Dept: OBSTETRICS AND GYNECOLOGY | Facility: OTHER | Age: 34
End: 2019-06-06
Payer: COMMERCIAL

## 2019-06-06 ENCOUNTER — HOSPITAL ENCOUNTER (INPATIENT)
Facility: OTHER | Age: 34
LOS: 3 days | Discharge: HOME | End: 2019-06-09
Attending: OBSTETRICS & GYNECOLOGY | Admitting: OBSTETRICS & GYNECOLOGY
Payer: COMMERCIAL

## 2019-06-06 DIAGNOSIS — Z3A.35 35 WEEKS GESTATION OF PREGNANCY: ICD-10-CM

## 2019-06-06 DIAGNOSIS — Z98.890 STATUS POST MYOMECTOMY: ICD-10-CM

## 2019-06-06 DIAGNOSIS — Z3A.37 37 WEEKS GESTATION OF PREGNANCY: ICD-10-CM

## 2019-06-06 LAB
ABO + RH BLD: NORMAL
BASOPHILS # BLD AUTO: 0.02 K/UL (ref 0–0.2)
BASOPHILS NFR BLD: 0.2 % (ref 0–1.9)
BLD GP AB SCN CELLS X3 SERPL QL: NORMAL
DIFFERENTIAL METHOD: ABNORMAL
EOSINOPHIL # BLD AUTO: 0.1 K/UL (ref 0–0.5)
EOSINOPHIL NFR BLD: 0.9 % (ref 0–8)
ERYTHROCYTE [DISTWIDTH] IN BLOOD BY AUTOMATED COUNT: 14.6 % (ref 11.5–14.5)
HCT VFR BLD AUTO: 34.8 % (ref 37–48.5)
HGB BLD-MCNC: 11 G/DL (ref 12–16)
HIV 1+2 AB+HIV1 P24 AG SERPL QL IA: NEGATIVE
HIV1+2 IGG SERPL QL IA.RAPID: NEGATIVE
LYMPHOCYTES # BLD AUTO: 2.5 K/UL (ref 1–4.8)
LYMPHOCYTES NFR BLD: 20.1 % (ref 18–48)
MCH RBC QN AUTO: 23.7 PG (ref 27–31)
MCHC RBC AUTO-ENTMCNC: 31.6 G/DL (ref 32–36)
MCV RBC AUTO: 75 FL (ref 82–98)
MONOCYTES # BLD AUTO: 0.9 K/UL (ref 0.3–1)
MONOCYTES NFR BLD: 6.7 % (ref 4–15)
NEUTROPHILS # BLD AUTO: 9 K/UL (ref 1.8–7.7)
NEUTROPHILS NFR BLD: 71 % (ref 38–73)
PLATELET # BLD AUTO: 254 K/UL (ref 150–350)
PMV BLD AUTO: 11.4 FL (ref 9.2–12.9)
RBC # BLD AUTO: 4.64 M/UL (ref 4–5.4)
WBC # BLD AUTO: 12.6 K/UL (ref 3.9–12.7)

## 2019-06-06 PROCEDURE — 11000001 HC ACUTE MED/SURG PRIVATE ROOM

## 2019-06-06 PROCEDURE — 71000033 HC RECOVERY, INTIAL HOUR: Performed by: OBSTETRICS & GYNECOLOGY

## 2019-06-06 PROCEDURE — 86703 HIV-1/HIV-2 1 RESULT ANTBDY: CPT | Mod: 91

## 2019-06-06 PROCEDURE — 63600175 PHARM REV CODE 636 W HCPCS: Performed by: STUDENT IN AN ORGANIZED HEALTH CARE EDUCATION/TRAINING PROGRAM

## 2019-06-06 PROCEDURE — 25000003 PHARM REV CODE 250: Performed by: STUDENT IN AN ORGANIZED HEALTH CARE EDUCATION/TRAINING PROGRAM

## 2019-06-06 PROCEDURE — 25000003 PHARM REV CODE 250: Performed by: OBSTETRICS & GYNECOLOGY

## 2019-06-06 PROCEDURE — 59514 CESAREAN DELIVERY ONLY: CPT | Mod: 82,AT,, | Performed by: OBSTETRICS & GYNECOLOGY

## 2019-06-06 PROCEDURE — 36004725 HC OB OR TIME LEV III - EA ADD 15 MIN: Performed by: OBSTETRICS & GYNECOLOGY

## 2019-06-06 PROCEDURE — 37000008 HC ANESTHESIA 1ST 15 MINUTES: Performed by: OBSTETRICS & GYNECOLOGY

## 2019-06-06 PROCEDURE — 59510 PRA FULL ROUT OBSTE CARE,CESAREAN DELIV: ICD-10-PCS | Mod: ,,, | Performed by: ANESTHESIOLOGY

## 2019-06-06 PROCEDURE — 59510 CESAREAN DELIVERY: CPT | Mod: ,,, | Performed by: ANESTHESIOLOGY

## 2019-06-06 PROCEDURE — 86901 BLOOD TYPING SEROLOGIC RH(D): CPT

## 2019-06-06 PROCEDURE — 85025 COMPLETE CBC W/AUTO DIFF WBC: CPT

## 2019-06-06 PROCEDURE — 36004724 HC OB OR TIME LEV III - 1ST 15 MIN: Performed by: OBSTETRICS & GYNECOLOGY

## 2019-06-06 PROCEDURE — 59514 PR CESAREAN DELIVERY ONLY: ICD-10-PCS | Mod: 82,AT,, | Performed by: OBSTETRICS & GYNECOLOGY

## 2019-06-06 PROCEDURE — 59510 CESAREAN DELIVERY: CPT | Mod: AT,,, | Performed by: OBSTETRICS & GYNECOLOGY

## 2019-06-06 PROCEDURE — 86592 SYPHILIS TEST NON-TREP QUAL: CPT

## 2019-06-06 PROCEDURE — 63600175 PHARM REV CODE 636 W HCPCS: Performed by: OBSTETRICS & GYNECOLOGY

## 2019-06-06 PROCEDURE — 86703 HIV-1/HIV-2 1 RESULT ANTBDY: CPT

## 2019-06-06 PROCEDURE — 71000039 HC RECOVERY, EACH ADD'L HOUR: Performed by: OBSTETRICS & GYNECOLOGY

## 2019-06-06 PROCEDURE — 37000009 HC ANESTHESIA EA ADD 15 MINS: Performed by: OBSTETRICS & GYNECOLOGY

## 2019-06-06 PROCEDURE — 59510 PR FULL ROUT OBSTE CARE,CESAREAN DELIV: ICD-10-PCS | Mod: AT,,, | Performed by: OBSTETRICS & GYNECOLOGY

## 2019-06-06 RX ORDER — BUPIVACAINE HYDROCHLORIDE 7.5 MG/ML
INJECTION, SOLUTION INTRASPINAL
Status: DISCONTINUED | OUTPATIENT
Start: 2019-06-06 | End: 2019-06-06

## 2019-06-06 RX ORDER — MUPIROCIN 20 MG/G
1 OINTMENT TOPICAL 2 TIMES DAILY
Status: DISCONTINUED | OUTPATIENT
Start: 2019-06-06 | End: 2019-06-09 | Stop reason: HOSPADM

## 2019-06-06 RX ORDER — OXYTOCIN/RINGER'S LACTATE 20/1000 ML
41.7 PLASTIC BAG, INJECTION (ML) INTRAVENOUS CONTINUOUS
Status: ACTIVE | OUTPATIENT
Start: 2019-06-06 | End: 2019-06-06

## 2019-06-06 RX ORDER — OXYCODONE AND ACETAMINOPHEN 5; 325 MG/1; MG/1
1 TABLET ORAL EVERY 4 HOURS PRN
Qty: 45 TABLET | Refills: 0 | Status: SHIPPED | OUTPATIENT
Start: 2019-06-07 | End: 2021-08-19

## 2019-06-06 RX ORDER — SODIUM CITRATE AND CITRIC ACID MONOHYDRATE 334; 500 MG/5ML; MG/5ML
30 SOLUTION ORAL
Status: DISCONTINUED | OUTPATIENT
Start: 2019-06-06 | End: 2019-06-09 | Stop reason: HOSPADM

## 2019-06-06 RX ORDER — OXYCODONE AND ACETAMINOPHEN 5; 325 MG/1; MG/1
1 TABLET ORAL EVERY 4 HOURS PRN
Status: DISCONTINUED | OUTPATIENT
Start: 2019-06-07 | End: 2019-06-09 | Stop reason: HOSPADM

## 2019-06-06 RX ORDER — DIPHENHYDRAMINE HCL 25 MG
25 CAPSULE ORAL EVERY 4 HOURS PRN
Status: DISCONTINUED | OUTPATIENT
Start: 2019-06-06 | End: 2019-06-09 | Stop reason: HOSPADM

## 2019-06-06 RX ORDER — ACETAMINOPHEN 325 MG/1
650 TABLET ORAL EVERY 6 HOURS
Status: DISPENSED | OUTPATIENT
Start: 2019-06-06 | End: 2019-06-07

## 2019-06-06 RX ORDER — OXYTOCIN/RINGER'S LACTATE 20/1000 ML
333 PLASTIC BAG, INJECTION (ML) INTRAVENOUS CONTINUOUS
Status: ACTIVE | OUTPATIENT
Start: 2019-06-06 | End: 2019-06-06

## 2019-06-06 RX ORDER — OXYTOCIN 10 [USP'U]/ML
INJECTION, SOLUTION INTRAMUSCULAR; INTRAVENOUS
Status: DISCONTINUED | OUTPATIENT
Start: 2019-06-06 | End: 2019-06-06

## 2019-06-06 RX ORDER — HYDROCORTISONE 25 MG/G
CREAM TOPICAL 3 TIMES DAILY PRN
Status: DISCONTINUED | OUTPATIENT
Start: 2019-06-06 | End: 2019-06-09 | Stop reason: HOSPADM

## 2019-06-06 RX ORDER — ONDANSETRON 2 MG/ML
INJECTION INTRAMUSCULAR; INTRAVENOUS
Status: DISCONTINUED | OUTPATIENT
Start: 2019-06-06 | End: 2019-06-06

## 2019-06-06 RX ORDER — HYDROMORPHONE HYDROCHLORIDE 2 MG/ML
0.5 INJECTION, SOLUTION INTRAMUSCULAR; INTRAVENOUS; SUBCUTANEOUS EVERY 6 HOURS PRN
Status: DISCONTINUED | OUTPATIENT
Start: 2019-06-06 | End: 2019-06-09 | Stop reason: HOSPADM

## 2019-06-06 RX ORDER — OXYTOCIN/RINGER'S LACTATE 20/1000 ML
41.65 PLASTIC BAG, INJECTION (ML) INTRAVENOUS CONTINUOUS
Status: ACTIVE | OUTPATIENT
Start: 2019-06-06 | End: 2019-06-06

## 2019-06-06 RX ORDER — MUPIROCIN 20 MG/G
OINTMENT TOPICAL
Status: DISCONTINUED | OUTPATIENT
Start: 2019-06-06 | End: 2019-06-09 | Stop reason: HOSPADM

## 2019-06-06 RX ORDER — DOCUSATE SODIUM 100 MG/1
200 CAPSULE, LIQUID FILLED ORAL 2 TIMES DAILY
Status: DISCONTINUED | OUTPATIENT
Start: 2019-06-06 | End: 2019-06-09 | Stop reason: HOSPADM

## 2019-06-06 RX ORDER — SODIUM CHLORIDE, SODIUM LACTATE, POTASSIUM CHLORIDE, CALCIUM CHLORIDE 600; 310; 30; 20 MG/100ML; MG/100ML; MG/100ML; MG/100ML
INJECTION, SOLUTION INTRAVENOUS CONTINUOUS
Status: ACTIVE | OUTPATIENT
Start: 2019-06-06 | End: 2019-06-06

## 2019-06-06 RX ORDER — CEFAZOLIN SODIUM 1 G/3ML
INJECTION, POWDER, FOR SOLUTION INTRAMUSCULAR; INTRAVENOUS
Status: DISCONTINUED | OUTPATIENT
Start: 2019-06-06 | End: 2019-06-06

## 2019-06-06 RX ORDER — IBUPROFEN 600 MG/1
600 TABLET ORAL EVERY 6 HOURS
Status: DISCONTINUED | OUTPATIENT
Start: 2019-06-07 | End: 2019-06-09 | Stop reason: HOSPADM

## 2019-06-06 RX ORDER — PHENYLEPHRINE HYDROCHLORIDE 10 MG/ML
INJECTION INTRAVENOUS
Status: DISCONTINUED | OUTPATIENT
Start: 2019-06-06 | End: 2019-06-06

## 2019-06-06 RX ORDER — KETOROLAC TROMETHAMINE 30 MG/ML
30 INJECTION, SOLUTION INTRAMUSCULAR; INTRAVENOUS EVERY 6 HOURS
Status: COMPLETED | OUTPATIENT
Start: 2019-06-06 | End: 2019-06-07

## 2019-06-06 RX ORDER — AMOXICILLIN 250 MG
1 CAPSULE ORAL NIGHTLY PRN
Status: DISCONTINUED | OUTPATIENT
Start: 2019-06-06 | End: 2019-06-09 | Stop reason: HOSPADM

## 2019-06-06 RX ORDER — SODIUM CHLORIDE, SODIUM LACTATE, POTASSIUM CHLORIDE, CALCIUM CHLORIDE 600; 310; 30; 20 MG/100ML; MG/100ML; MG/100ML; MG/100ML
INJECTION, SOLUTION INTRAVENOUS CONTINUOUS
Status: DISCONTINUED | OUTPATIENT
Start: 2019-06-06 | End: 2019-06-09 | Stop reason: HOSPADM

## 2019-06-06 RX ORDER — PROMETHAZINE HYDROCHLORIDE 25 MG/ML
INJECTION, SOLUTION INTRAMUSCULAR; INTRAVENOUS
Status: DISCONTINUED | OUTPATIENT
Start: 2019-06-06 | End: 2019-06-06

## 2019-06-06 RX ORDER — LIDOCAINE HCL/EPINEPHRINE/PF 2%-1:200K
VIAL (ML) INJECTION
Status: DISCONTINUED | OUTPATIENT
Start: 2019-06-06 | End: 2019-06-06

## 2019-06-06 RX ORDER — BISACODYL 10 MG
10 SUPPOSITORY, RECTAL RECTAL ONCE AS NEEDED
Status: DISCONTINUED | OUTPATIENT
Start: 2019-06-06 | End: 2019-06-09 | Stop reason: HOSPADM

## 2019-06-06 RX ORDER — DEXAMETHASONE SODIUM PHOSPHATE 4 MG/ML
INJECTION, SOLUTION INTRA-ARTICULAR; INTRALESIONAL; INTRAMUSCULAR; INTRAVENOUS; SOFT TISSUE
Status: DISCONTINUED | OUTPATIENT
Start: 2019-06-06 | End: 2019-06-06

## 2019-06-06 RX ORDER — SIMETHICONE 80 MG
1 TABLET,CHEWABLE ORAL EVERY 6 HOURS PRN
Status: DISCONTINUED | OUTPATIENT
Start: 2019-06-06 | End: 2019-06-09 | Stop reason: HOSPADM

## 2019-06-06 RX ORDER — OXYCODONE HYDROCHLORIDE 5 MG/1
5 TABLET ORAL EVERY 4 HOURS PRN
Status: DISCONTINUED | OUTPATIENT
Start: 2019-06-06 | End: 2019-06-09 | Stop reason: HOSPADM

## 2019-06-06 RX ORDER — IBUPROFEN 600 MG/1
600 TABLET ORAL EVERY 6 HOURS
Qty: 60 TABLET | Refills: 1 | Status: SHIPPED | OUTPATIENT
Start: 2019-06-07 | End: 2021-08-19

## 2019-06-06 RX ORDER — FENTANYL CITRATE 50 UG/ML
INJECTION, SOLUTION INTRAMUSCULAR; INTRAVENOUS
Status: DISCONTINUED | OUTPATIENT
Start: 2019-06-06 | End: 2019-06-06

## 2019-06-06 RX ORDER — MORPHINE SULFATE 0.5 MG/ML
INJECTION, SOLUTION EPIDURAL; INTRATHECAL; INTRAVENOUS
Status: DISCONTINUED | OUTPATIENT
Start: 2019-06-06 | End: 2019-06-06

## 2019-06-06 RX ORDER — CHLOROPROCAINE HYDROCHLORIDE 30 MG/ML
INJECTION, SOLUTION EPIDURAL; INFILTRATION; INTRACAUDAL; PERINEURAL
Status: DISCONTINUED | OUTPATIENT
Start: 2019-06-06 | End: 2019-06-06

## 2019-06-06 RX ORDER — ONDANSETRON 8 MG/1
8 TABLET, ORALLY DISINTEGRATING ORAL EVERY 8 HOURS PRN
Status: CANCELLED | OUTPATIENT
Start: 2019-06-06

## 2019-06-06 RX ORDER — ONDANSETRON 2 MG/ML
4 INJECTION INTRAMUSCULAR; INTRAVENOUS EVERY 6 HOURS PRN
Status: DISCONTINUED | OUTPATIENT
Start: 2019-06-06 | End: 2019-06-09 | Stop reason: HOSPADM

## 2019-06-06 RX ORDER — MISOPROSTOL 200 UG/1
800 TABLET ORAL
Status: DISCONTINUED | OUTPATIENT
Start: 2019-06-06 | End: 2019-06-09 | Stop reason: HOSPADM

## 2019-06-06 RX ORDER — OXYCODONE AND ACETAMINOPHEN 10; 325 MG/1; MG/1
1 TABLET ORAL EVERY 4 HOURS PRN
Status: DISCONTINUED | OUTPATIENT
Start: 2019-06-07 | End: 2019-06-09 | Stop reason: HOSPADM

## 2019-06-06 RX ORDER — KETOROLAC TROMETHAMINE 30 MG/ML
INJECTION, SOLUTION INTRAMUSCULAR; INTRAVENOUS
Status: DISCONTINUED | OUTPATIENT
Start: 2019-06-06 | End: 2019-06-06

## 2019-06-06 RX ORDER — DOCUSATE SODIUM 100 MG/1
200 CAPSULE, LIQUID FILLED ORAL 2 TIMES DAILY
Qty: 40 CAPSULE | Refills: 0 | COMMUNITY
Start: 2019-06-07 | End: 2021-08-19

## 2019-06-06 RX ORDER — OXYCODONE HYDROCHLORIDE 5 MG/1
10 TABLET ORAL EVERY 4 HOURS PRN
Status: DISCONTINUED | OUTPATIENT
Start: 2019-06-06 | End: 2019-06-09 | Stop reason: HOSPADM

## 2019-06-06 RX ORDER — ADHESIVE BANDAGE
30 BANDAGE TOPICAL 2 TIMES DAILY PRN
Status: DISCONTINUED | OUTPATIENT
Start: 2019-06-07 | End: 2019-06-09 | Stop reason: HOSPADM

## 2019-06-06 RX ORDER — ACETAMINOPHEN 10 MG/ML
INJECTION, SOLUTION INTRAVENOUS
Status: DISCONTINUED | OUTPATIENT
Start: 2019-06-06 | End: 2019-06-06

## 2019-06-06 RX ORDER — CEFAZOLIN SODIUM 2 G/50ML
2 SOLUTION INTRAVENOUS
Status: DISCONTINUED | OUTPATIENT
Start: 2019-06-06 | End: 2019-06-09 | Stop reason: HOSPADM

## 2019-06-06 RX ADMIN — LIDOCAINE HYDROCHLORIDE,EPINEPHRINE BITARTRATE 5 ML: 20; .005 INJECTION, SOLUTION EPIDURAL; INFILTRATION; INTRACAUDAL; PERINEURAL at 07:06

## 2019-06-06 RX ADMIN — DOCUSATE SODIUM 200 MG: 100 CAPSULE, LIQUID FILLED ORAL at 08:06

## 2019-06-06 RX ADMIN — SODIUM CITRATE AND CITRIC ACID MONOHYDRATE 30 ML: 500; 334 SOLUTION ORAL at 07:06

## 2019-06-06 RX ADMIN — ACETAMINOPHEN 1000 MG: 10 INJECTION, SOLUTION INTRAVENOUS at 08:06

## 2019-06-06 RX ADMIN — SODIUM CHLORIDE, SODIUM LACTATE, POTASSIUM CHLORIDE, AND CALCIUM CHLORIDE: 600; 310; 30; 20 INJECTION, SOLUTION INTRAVENOUS at 06:06

## 2019-06-06 RX ADMIN — LIDOCAINE HYDROCHLORIDE,EPINEPHRINE BITARTRATE 2 ML: 20; .005 INJECTION, SOLUTION EPIDURAL; INFILTRATION; INTRACAUDAL; PERINEURAL at 07:06

## 2019-06-06 RX ADMIN — OXYTOCIN 2 UNITS: 10 INJECTION, SOLUTION INTRAMUSCULAR; INTRAVENOUS at 08:06

## 2019-06-06 RX ADMIN — CHLOROPROCAINE HYDROCHLORIDE 5 ML: 30 INJECTION, SOLUTION EPIDURAL; INFILTRATION; INTRACAUDAL; PERINEURAL at 07:06

## 2019-06-06 RX ADMIN — OXYTOCIN 2 UNITS: 10 INJECTION, SOLUTION INTRAMUSCULAR; INTRAVENOUS at 07:06

## 2019-06-06 RX ADMIN — PHENYLEPHRINE HYDROCHLORIDE 50 MCG/MIN: 10 INJECTION INTRAVENOUS at 07:06

## 2019-06-06 RX ADMIN — DEXAMETHASONE SODIUM PHOSPHATE 4 MG: 4 INJECTION, SOLUTION INTRAMUSCULAR; INTRAVENOUS at 08:06

## 2019-06-06 RX ADMIN — FENTANYL CITRATE 10 MCG: 50 INJECTION, SOLUTION INTRAMUSCULAR; INTRAVENOUS at 07:06

## 2019-06-06 RX ADMIN — SODIUM CHLORIDE, SODIUM LACTATE, POTASSIUM CHLORIDE, AND CALCIUM CHLORIDE: 600; 310; 30; 20 INJECTION, SOLUTION INTRAVENOUS at 07:06

## 2019-06-06 RX ADMIN — KETOROLAC TROMETHAMINE 30 MG: 30 INJECTION, SOLUTION INTRAMUSCULAR at 08:06

## 2019-06-06 RX ADMIN — CEFAZOLIN 2 G: 330 INJECTION, POWDER, FOR SOLUTION INTRAMUSCULAR; INTRAVENOUS at 07:06

## 2019-06-06 RX ADMIN — Medication 1 MG: at 08:06

## 2019-06-06 RX ADMIN — PROMETHAZINE HYDROCHLORIDE 6.25 MG: 25 INJECTION INTRAMUSCULAR; INTRAVENOUS at 08:06

## 2019-06-06 RX ADMIN — PHENYLEPHRINE HYDROCHLORIDE 100 MCG: 10 INJECTION INTRAVENOUS at 08:06

## 2019-06-06 RX ADMIN — BUPIVACAINE HYDROCHLORIDE IN DEXTROSE 1.6 ML: 7.5 INJECTION, SOLUTION SUBARACHNOID at 07:06

## 2019-06-06 RX ADMIN — OXYCODONE HYDROCHLORIDE 5 MG: 5 TABLET ORAL at 09:06

## 2019-06-06 RX ADMIN — DOCUSATE SODIUM 200 MG: 100 CAPSULE, LIQUID FILLED ORAL at 02:06

## 2019-06-06 RX ADMIN — Medication 0.1 MG: at 07:06

## 2019-06-06 RX ADMIN — ONDANSETRON 4 MG: 2 INJECTION INTRAMUSCULAR; INTRAVENOUS at 07:06

## 2019-06-06 RX ADMIN — ACETAMINOPHEN 650 MG: 325 TABLET, FILM COATED ORAL at 02:06

## 2019-06-06 RX ADMIN — HYDROMORPHONE HYDROCHLORIDE 0.5 MG: 2 INJECTION INTRAMUSCULAR; INTRAVENOUS; SUBCUTANEOUS at 10:06

## 2019-06-06 RX ADMIN — KETOROLAC TROMETHAMINE 30 MG: 30 INJECTION, SOLUTION INTRAMUSCULAR at 02:06

## 2019-06-06 RX ADMIN — FENTANYL CITRATE 90 MCG: 50 INJECTION, SOLUTION INTRAMUSCULAR; INTRAVENOUS at 07:06

## 2019-06-06 RX ADMIN — ACETAMINOPHEN 650 MG: 325 TABLET, FILM COATED ORAL at 08:06

## 2019-06-06 RX ADMIN — KETOROLAC TROMETHAMINE 30 MG: 30 INJECTION, SOLUTION INTRAMUSCULAR; INTRAVENOUS at 08:06

## 2019-06-06 NOTE — TRANSFER OF CARE
Anesthesia Transfer of Care Note    Patient: Olga Prater    Procedure(s) Performed: Procedure(s) (LRB):   SECTION (N/A)    Patient location: PACU    Anesthesia Type: CSE    Transport from OR: Transported from OR on room air with adequate spontaneous ventilation    Post pain: adequate analgesia    Post assessment: no apparent anesthetic complications    Post vital signs: stable    Level of consciousness: awake, alert and oriented    Nausea/Vomiting: no nausea/vomiting    Complications: none    Transfer of care protocol was followed      Last vitals:   Visit Vitals  /86   Pulse 78   Temp 36.6 °C (97.8 °F) (Temporal)   Resp 18   Wt 64.5 kg (142 lb 3.2 oz)   LMP 09/15/2018   SpO2 100%   BMI 26.01 kg/m²

## 2019-06-06 NOTE — LACTATION NOTE
"   06/06/19 1700   Maternal Assessment   Breast Shape Right:;round   Breast Density Right:;soft   Areola Right:;elastic  (bruised areola)   Nipples Right:;graspable   Maternal Infant Feeding   Latch Assistance no   Basic lactation education reviewed. Mother reports infant is "aggressive," bruised Right areola noted; otherwise states infant is nursing well so far. Encouraged [facility].  number on board.   "

## 2019-06-06 NOTE — H&P
[facility]  History & Physical  Obstetrics      SUBJECTIVE:     Chief Complaint/Reason for Admission: Csection    History of Present Illness:  Olga Prater is a 33 y.o.  female with an Estimated Date of Delivery: 19 admitted for .  Her current obstetrical history is complicated by prior myomectomy, h/o LTCS and h/o severe PreEclampsia.      PTA Medications   Medication Sig    aspirin 81 MG Chew Take 81 mg by mouth once daily.    PNV no.95/ferrous fum/folic ac (PRENATAL ORAL) Take by mouth.       Review of patient's allergies indicates:  No Known Allergies     Past Medical History:   Diagnosis Date    Fibroadenoma of right breast     Menarche     Age of onset 13    Migraine     Severe preeclampsia 2017     Past Surgical History:   Procedure Laterality Date    BREAST BIOPSY      fibroadenoma     SECTION      for NRFHTs, induction for Severe PreEclampsia    DELIVERY- SECTION N/A 2017    [facility]formed [facility] at [facility]D    MYOMECTOMY  2017    MYOMECTOMY- OPEN N/A 2017    Performed by [facility] MD at [facility] OR    WISDOM TOOTH EXTRACTION       Family History   Problem Relation Age of Onset    Diabetes Father         Pre    Hypertension Father     Skin cancer Father     Cancer Father     Thyroid disease Mother     Hypertension Sister     Breast cancer Maternal Grandmother     Cancer Maternal Grandmother     Breast cancer Maternal Aunt     Colon cancer Neg Hx     Ovarian cancer Neg Hx     Melanoma Neg Hx      Social History     Tobacco Use    Smoking status: Never Smoker    Smokeless tobacco: Never Used   Substance Use Topics    Alcohol use: Yes     Comment: social     Drug use: No       Review of Systems  Constitutional: no fever or chills  Eyes: no visual changes  Respiratory: no cough or shortness of breath  Cardiovascular: no chest pain or palpitations  Gastrointestinal: no nausea or vomiting,  tolerating diet  Genitourinary: no hematuria or dysuria     OBJECTIVE:     Vital Signs (Most Recent):       Physical Exam:  General:  alert and normal appearing gravid female   Skin:  Skin color, texture, turgor normal. No rashes or lesions   HEENT:  conjunctivae/corneas clear. PERRL.   Lungs:  clear to auscultation bilaterally   Heart:  regular rate and rhythm, S1, S2 normal, no murmur, click, rub or gallop   Breasts:     Abdomen:  soft, non-tender; bowel sounds normal   Uterine Size:     Presentations:  cephalic   FHT:  Reactive   Pelvis:    Cervix:     Dilation:     Effacement:     Station:      Consistency:     Position:      Laboratory:  Lab Results   Component Value Date    GROUPTRH O POS 2018    HEPBSAG Negative 2018        Diagnostic Results:      ASSESSMENT/PLAN:     37 wks 5 days gestation.     Conditions:  prior myomectomy, h/o LTCS and h/o severe PreEclampsia     Risks, benefits, alternatives and possible complications have been discussed in detail with the patient.  Pre-admission, admission, and post admission procedures and expectations were discussed in detail.  All questions answered, all appropriate consents will be signed at the Hospital. Admit

## 2019-06-06 NOTE — OP NOTE
Operative Report for  Delivery:    Date of Procedure: 2019     Procedure: Procedure(s) (LRB):   SECTION (N/A)       Surgeon(s) and Role:     * Mohan Pastrana MD - Primary     * Deloris Gruber MD - Assisting        A qualified resident was not available.    Pre-Operative Diagnosis: Previous  section [Z98.891]  H/O myomectomy [Z98.890]  Pregnancy at 37 weeks 5 days    Post-Operative Diagnosis: Post-Op Diagnosis Codes:     * Previous  section [Z98.891]     * H/O myomectomy [Z98.890]       Pregnancy at 37 weeks 5 days    Anesthesia: Spinal/Epidural    Complications: none    Findings: Viable male infant, normal uterus, tubes and ovaries    EBL: 700cc    Procedure in detail:    The patient was taken to the operating room where epidural/spinal anesthesia was found to be adequate. She was then prepped and draped in the normal sterile fashion. Allis clamp was used to verify adequate anesthesia. A sahni catheter was in place.     After Time Out was performed, A scalpel was used to make a Pfannensteil incision. The knife was used to carry down to the underlying layer of fascia. The fascia was then incised in the midline. The curved tubbs scissors were then used to extend the fascia incision laterally. The fascia was then elevated using the kocher clamps and extended both inferiorly and superiorly using the curved tubbs scissors. The rectus muscles were then  in the midline and the peritoneum was then entered bluntly and extended but bluntly and sharply with the curved tubbs scissors. The bladder blade was then inserted and the vesicouterine peritoneum was then entered sharply with the Metzenbaum scissors and extended laterally and the bladder flap was created digitally. The bladder blade was then reinserted.     The inside scalpel was then used to make a LOW TRANSVERSE incision in the uterus. This was extended bluntly. The amniotic fluid was noted to be clear. The infants head was  delivered atraumatically and the infant's body was delivered atraumatically. The nose and mouth were suctioned with the bulb suction. The cord was clamped and cut and the baby was handed off to awaiting pediatric attendant. The placenta was then removed manually and the uterus was then exteriorized and cleared of all clots and debris. The bladder blade was then reinserted.     The uterine incision was then closed using a #1 Chromic in a running locked suture. A second imbricating suture of #1 chromic was used to close a second layer with excellent hemostasis. The abdomen was then irrigated and cleared of all clots and debris. The uterine incision was then reexamined and noted to have excellent hemostasis. The uterus was then returned to the abdomen. The bladder blade was replaced and then uterine incision was then reexamined and noted to have excellent hemostasis.     The peritoneum and rectus muscles were then re approximated using 2-0 Vicryl in a running fashion. The rectus muscles were then irrigated and and bleeding areas were cauterized using the Bovie with excellent hemostasis. The fascia was then closed using #1 Vicryl in a running fashion. The fat was then cauterized for any small bleeding areas. 2-0 plain gut was then used to re approximate the fat. 4-0 Monocryl was then used to close the skin in a subcuticular fashion. The patient tolerated the procedure well. Sponge lap and needle counts were correct x 2.

## 2019-06-06 NOTE — OP NOTE
Certification of Assistant at Surgery       Surgery Date: 2019     Participating Surgeons:  Surgeon(s) and Role:     * Mohan Pastrana MD - Primary     * Deloris Gruber MD - Assisting    Procedures:  Procedure(s) (LRB):   SECTION (N/A)    Assistant Surgeon's Certification of Necessity:  I understand that section 1842 (b) (6) (d) of the Social Security Act generally prohibits Medicare Part B reasonable charge payment for the services of assistants at surgery in teaching hospitals when qualified residents are available to furnish such services. I certify that the services for which payment is claimed were medically necessary, and that no qualified resident was available to perform the services. I further understand that these services are subject to post-payment review by the Medicare carrier.      Deloris Gruber MD    2019  9:06 AM

## 2019-06-06 NOTE — ANESTHESIA PROCEDURE NOTES
CSE    Patient location during procedure: OR  Start time: 2019 7:12 AM  Timeout: 2019 7:12 AM  End time: 2019 7:18 AM  Staffing  Anesthesiologist: Mary Jane Moy MD  Resident/CRNA: Alem Lyon DO  Performed: resident/CRNA   Preanesthetic Checklist  Completed: patient identified, site marked, surgical consent, pre-op evaluation, timeout performed, IV checked, risks and benefits discussed and monitors and equipment checked  CSE  Patient position: sitting  Prep: ChloraPrep  Spinal Needle  Needle type: pencil-tip   Needle gauge: 25 G  Needle length: 5 in  Epidural Needle  Injection technique: ABDELRAHMAN saline  Needle type: Tuohy   Needle gauge: 17 G  Needle length: 3.5 in  Needle insertion depth: 4 cm  Location: L3-4  Needle localization: anatomical landmarks  Catheter  Catheter type: springwMComms TV  Catheter size: 18 G  Catheter at skin depth: 8 cm  Test dose: lidocaine 2% with Epi 1-to-200,000  Additional Documentation: negative aspiration for heme  Assessment  Sensory level: T5   Dermatomal levels determined by pinch or prick  Intrathecal Medications:  administered: primary anesthetic mcg of    Additional Notes  During spinal placement, what appeared to be CSF returned from spinal needle and was aspirated prior to injection of medication as well as after medication was administered.  Pt did not feel numb with and felt sahni being placed.  Decision was made to use epidural for .

## 2019-06-06 NOTE — DISCHARGE INSTRUCTIONS
Breastfeeding Discharge Instructions       Feed the baby at the earliest sign of hunger or comfort  o Hands to mouth, sucking motions  o Rooting or searching for something to suck on  o Dont wait for crying - it is a sign of distress     The feedings may be 8-12 times per 24hrs and will not follow a schedule   Avoid pacifiers and bottles for the first 4 weeks   Alternate the breast you start the feeding with, or start with the breast that feels the fullest   Switch breasts when the baby takes himself off the breast or falls asleep   Keep offering breasts until the baby looks full, no longer gives hunger signs, and stays asleep when placed on his back in the crib   If the baby is sleepy and wont wake for a feeding, put the baby skin-to-skin dressed in a diaper against the mothers bare chest   Sleep near your baby   The baby should be positioned and latched on to the breast correctly  o Chest-to-chest, chin in the breast  o Babys lips are flipped outward  o Babys mouth is stretched open wide like a shout  o Babys sucking should feel like tugging to the mother  - The baby should be drinking at the breast:  o You should hear swallowing or gulping throughout the feeding  o You should see milk on the babys lips when he comes off the breast  o Your breasts should be softer when the baby is finished feeding  o The baby should look relaxed at the end of feedings  o After the 4th day and your milk is in:  o The babys poop should turn bright yellow and be loose, watery, and seedy  o The baby should have at least 3-4 poops the size of the palm of your hand per day  o The baby should have at least 5-6 wet diapers per day  o The urine should be light yellow in color  You should drink when you are thirsty and eat a healthy diet when you are    hungry.     Take naps to get the rest you need.   Take medications and/or drink alcohol only with permission of your obstetrician    or the babys pediatrician.  You can  also call the Infant Risk Center,   ([phone]), Monday-Friday, 8am-5pm Central time, to get the most   up-to-date evidence-based information on the use of medications during   pregnancy and breastfeeding.      The baby should be examined by a pediatrician at 3-5 days of age.  Once your   milk comes in, the baby should be gaining at least ½ - 1oz each day and should be back to birthweight no later than 10-14 days of age.          Community Resources    [facility] Breastfeeding Warmline: [phone]   Local [facility] clinics: provide incentives and breastpumps to eligible mothers  La Leche [employer] (LLLI):  mother-to-mother support group website        www.Risk Management Solutionl.[employer]  Local La Leche League mother-to-mother support groups:        www.SputnikBot        La Leche League [facility]   Dr. Christofer Frank website for latch videos and general information:        www.breastfeedinginc.ca  [facility]ant Risk Center is a call center that provides information about the safety of taking medications while breastfeeding.  Call [phone], M-F, 8am-5pm, [place].  International Lactation Consultant Association provides resources for assistance:        www.ilca.org  [place] Breastfeeding Coalition provides informationand resources for parents  and the community    www.LaBreastfeedingSupport.org     Faviola Mckeon is a mom-to-mom support group:                             www.[employer].com//breastfeedng-support/  Partners for Healthy Babies:  5-538-837-BABY(4396)  C[employer]: a breastfeeding support group for women of color, [phone]

## 2019-06-06 NOTE — PLAN OF CARE
Problem: Breastfeeding  Goal: Effective Breastfeeding  Outcome: Ongoing (interventions implemented as appropriate)  Mother to breastfeed infant 8 or more times in 24hrs on infant's cue until content, frequent skin to skin, and will avoid bottles and pacifiers.  Mother is to keep infant actively feeding by keeping infant stimulated and using breast compression. Mother ensure effective nursing by hearing infant swallows and feeling nice tugs and pulls. Latch should not be painful while nursing.  Mother to record all breastfeedings, voids and stools in breastfeeding guide. Mother to call  for breastfeeding assistance or questions .  Refer breastfeeding guide for lactation education.

## 2019-06-07 PROBLEM — Z3A.35 35 WEEKS GESTATION OF PREGNANCY: Status: RESOLVED | Noted: 2019-06-06 | Resolved: 2019-06-07

## 2019-06-07 PROBLEM — D62 ACUTE BLOOD LOSS ANEMIA: Status: ACTIVE | Noted: 2019-06-07

## 2019-06-07 LAB
BASOPHILS # BLD AUTO: 0.03 K/UL (ref 0–0.2)
BASOPHILS NFR BLD: 0.2 % (ref 0–1.9)
DIFFERENTIAL METHOD: ABNORMAL
EOSINOPHIL # BLD AUTO: 0 K/UL (ref 0–0.5)
EOSINOPHIL NFR BLD: 0.3 % (ref 0–8)
ERYTHROCYTE [DISTWIDTH] IN BLOOD BY AUTOMATED COUNT: 14.7 % (ref 11.5–14.5)
HCT VFR BLD AUTO: 28.9 % (ref 37–48.5)
HGB BLD-MCNC: 8.8 G/DL (ref 12–16)
LYMPHOCYTES # BLD AUTO: 2.6 K/UL (ref 1–4.8)
LYMPHOCYTES NFR BLD: 17.7 % (ref 18–48)
MCH RBC QN AUTO: 23.3 PG (ref 27–31)
MCHC RBC AUTO-ENTMCNC: 30.4 G/DL (ref 32–36)
MCV RBC AUTO: 77 FL (ref 82–98)
MONOCYTES # BLD AUTO: 1.5 K/UL (ref 0.3–1)
MONOCYTES NFR BLD: 10.2 % (ref 4–15)
NEUTROPHILS # BLD AUTO: 10.3 K/UL (ref 1.8–7.7)
NEUTROPHILS NFR BLD: 71.1 % (ref 38–73)
PLATELET # BLD AUTO: 230 K/UL (ref 150–350)
PMV BLD AUTO: 11.2 FL (ref 9.2–12.9)
RBC # BLD AUTO: 3.78 M/UL (ref 4–5.4)
RPR SER QL: NORMAL
WBC # BLD AUTO: 14.5 K/UL (ref 3.9–12.7)

## 2019-06-07 PROCEDURE — 36415 COLL VENOUS BLD VENIPUNCTURE: CPT

## 2019-06-07 PROCEDURE — 85025 COMPLETE CBC W/AUTO DIFF WBC: CPT

## 2019-06-07 PROCEDURE — 11000001 HC ACUTE MED/SURG PRIVATE ROOM

## 2019-06-07 PROCEDURE — 25000003 PHARM REV CODE 250: Performed by: OBSTETRICS & GYNECOLOGY

## 2019-06-07 PROCEDURE — 99024 PR POST-OP FOLLOW-UP VISIT: ICD-10-PCS | Mod: ,,, | Performed by: OBSTETRICS & GYNECOLOGY

## 2019-06-07 PROCEDURE — 25000003 PHARM REV CODE 250: Performed by: STUDENT IN AN ORGANIZED HEALTH CARE EDUCATION/TRAINING PROGRAM

## 2019-06-07 PROCEDURE — 63600175 PHARM REV CODE 636 W HCPCS: Performed by: STUDENT IN AN ORGANIZED HEALTH CARE EDUCATION/TRAINING PROGRAM

## 2019-06-07 PROCEDURE — 99024 POSTOP FOLLOW-UP VISIT: CPT | Mod: ,,, | Performed by: OBSTETRICS & GYNECOLOGY

## 2019-06-07 RX ADMIN — OXYCODONE HYDROCHLORIDE AND ACETAMINOPHEN 1 TABLET: 5; 325 TABLET ORAL at 10:06

## 2019-06-07 RX ADMIN — IBUPROFEN 600 MG: 600 TABLET ORAL at 07:06

## 2019-06-07 RX ADMIN — KETOROLAC TROMETHAMINE 30 MG: 30 INJECTION, SOLUTION INTRAMUSCULAR at 03:06

## 2019-06-07 RX ADMIN — OXYCODONE HYDROCHLORIDE AND ACETAMINOPHEN 1 TABLET: 10; 325 TABLET ORAL at 03:06

## 2019-06-07 RX ADMIN — ONDANSETRON 4 MG: 2 INJECTION INTRAMUSCULAR; INTRAVENOUS at 10:06

## 2019-06-07 RX ADMIN — IBUPROFEN 600 MG: 600 TABLET ORAL at 08:06

## 2019-06-07 RX ADMIN — ACETAMINOPHEN 650 MG: 325 TABLET, FILM COATED ORAL at 03:06

## 2019-06-07 RX ADMIN — IBUPROFEN 600 MG: 600 TABLET ORAL at 01:06

## 2019-06-07 RX ADMIN — DOCUSATE SODIUM 200 MG: 100 CAPSULE, LIQUID FILLED ORAL at 08:06

## 2019-06-07 RX ADMIN — DOCUSATE SODIUM 200 MG: 100 CAPSULE, LIQUID FILLED ORAL at 07:06

## 2019-06-07 RX ADMIN — ONDANSETRON 4 MG: 2 INJECTION INTRAMUSCULAR; INTRAVENOUS at 03:06

## 2019-06-07 NOTE — HOSPITAL COURSE
POD#1: Feeling well, denies complaints  POD#2: Patient notes increased pain, wants to try Norco for pain control.   POD#3: Feeling well, denies complaints

## 2019-06-07 NOTE — LACTATION NOTE
"This note was copied from a baby's chart.  Pt reports she has started pumping and supplementing formula because "latch is too painful" and baby was "crying too much in the night." Pt c/o sore tender nipples, also has dime size bruise on upper R areola.  provides shells and instructions on nipple care. PT calls for assistance with latch, and infant is latched to R breast in cr-cr, pt c/o pain of "5."  assists pt to undress baby STS and latch deeply in cr-cr and FB. Infant gapes wide and latches deeply, but frets and slides back to shallow latch onto nipple each time. Upon oral assessment, infant has strong coordinated suck, tongue extends past gums, and labial frenum is short and tight with top lip curled under. Attempted latch again, top lip curled under, but active jaw movement with audible swallows. Mom states it's "too painful" and she is just "going to pump and bottle" feed.  offers to provide TOTs resource list and pt consents.   "

## 2019-06-07 NOTE — ANESTHESIA POSTPROCEDURE EVALUATION
Anesthesia Post Evaluation    Patient: Olga Prater    Procedure(s) Performed: Procedure(s) (LRB):   SECTION (N/A)    Final Anesthesia Type: CSE  Patient location during evaluation: labor & delivery  Patient participation: Yes- Able to Participate  Level of consciousness: awake and alert  Post-procedure vital signs: reviewed and stable  Pain management: adequate  Airway patency: patent  PONV status at discharge: No PONV  Anesthetic complications: no      Cardiovascular status: blood pressure returned to baseline and stable  Respiratory status: room air, unassisted and spontaneous ventilation  Hydration status: euvolemic  Follow-up not needed.          Vitals Value Taken Time   /69 2019 11:45 AM   Temp 36.9 °C (98.5 °F) 2019 11:45 AM   Pulse 68 2019  7:30 AM   Resp 18 2019 11:45 AM   SpO2 96 % 2019 11:45 AM         Event Time     Out of Recovery 11:10:00          Pain/Nidia Score: Pain Rating Prior to Med Admin: 4 (2019  7:59 AM)  Pain Rating Post Med Admin: 2 (2019  8:45 AM)

## 2019-06-07 NOTE — LACTATION NOTE
"Pt reports she pumped at 1400 and 1600, got "a few mls" each time. Pt has tender nipples, reports shells are helping. Pt requests  check flange size. 24mm flange appears too large;  provided 21mm flange, pt denies any pain.   "

## 2019-06-07 NOTE — SUBJECTIVE & OBJECTIVE
Hospital course: POD#1: Feeling well, denies complaints    Interval History:     She is doing well this morning. She is tolerating a regular diet without nausea or vomiting. She is voiding spontaneously. She is ambulating. She has passed flatus, and has not a BM. Vaginal bleeding is mild. She denies fever or chills. Abdominal pain is mild and controlled with oral medications.     Objective:     Vital Signs (Most Recent):  Temp: 99 °F (37.2 °C) (06/07/19 0730)  Pulse: 68 (06/07/19 0730)  Resp: 18 (06/07/19 0730)  BP: 125/74 (06/07/19 0730)  SpO2: 99 % (06/07/19 0730) Vital Signs (24h Range):  Temp:  [97.8 °F (36.6 °C)-99 °F (37.2 °C)] 99 °F (37.2 °C)  Pulse:  [64-77] 68  Resp:  [14-18] 18  SpO2:  [94 %-99 %] 99 %  BP: ()/(54-74) 125/74     Weight: 64.5 kg (142 lb 3.2 oz)  Body mass index is 26.87 kg/m².      Intake/Output Summary (Last 24 hours) at 6/7/2019 1059  Last data filed at 6/7/2019 0145  Gross per 24 hour   Intake 350 ml   Output 2750 ml   Net -2400 ml       Significant Labs:  Lab Results   Component Value Date    GROUPTRH O POS 06/06/2019    HEPBSAG Negative 12/13/2018    STREPBCULT No Group B Streptococcus isolated 05/21/2019     Recent Labs   Lab 06/07/19  0518   HGB 8.8*   HCT 28.9*       I have personallly reviewed all pertinent lab results from the last 24 hours.    Physical Exam:   Constitutional: She is oriented to person, place, and time. She appears well-developed and well-nourished. No distress.    HENT:   Head: Normocephalic and atraumatic.      Cardiovascular: Normal rate, regular rhythm, normal heart sounds and intact distal pulses.     Pulmonary/Chest: Effort normal. No respiratory distress.        Abdominal: Soft. Bowel sounds are normal. Abdominal incision: c/d/i. There is no guarding.   Firm fundus below umbilicus     Genitourinary: Uterus normal. No vaginal discharge found.           Musculoskeletal: Normal range of motion and moves all extremeties.       Neurological: She is alert  and oriented to person, place, and time.    Skin: Skin is warm. She is not diaphoretic.    Psychiatric: She has a normal mood and affect. Her behavior is normal. Judgment and thought content normal.

## 2019-06-07 NOTE — NURSING
[employer] pump, tubing, collections containers and labels brought to bedside.  Discussed proper pump setting of initiation phase.  Instructed on proper usage of pump and to adjust suction according to maximum comfort level.  Verified appropriate flange fit.  Educated on the frequency and duration of pumping in order to promote and maintain a full milk supply.  Hands on pumping technique reviewed.  Encouraged hand expression after pumping.  Instructed on cleaning of breast pump parts.  Written instructions also given.  Pt verbalized understanding and appropriate recall for proper milk handling, collection, labeling, storage and transportation.

## 2019-06-07 NOTE — PLAN OF CARE
Problem: Adult Inpatient Plan of Care  Goal: Plan of Care Review  Vital signs stable. Ambulating and voiding without difficulty. Vaginal bleeding is moderate. Incision is clean, dry, and edges approximated. Discussed POC with patient, patient verbalized understanding. Pt stable with no distress noted. Will continue to monitor.

## 2019-06-07 NOTE — PROGRESS NOTES
[facility]  Obstetrics  Postpartum Progress Note    Patient Name: Olga Prater  MRN: [id number]  Admission Date: 2019  Hospital Length of Stay: 1 days  Attending Physician: Mohan Pastrana MD  Primary Care Provider: Primary Doctor No    Subjective:     Principal Problem: delivery delivered    Hospital course: POD#1: Feeling well, denies complaints    Interval History:     She is doing well this morning. She is tolerating a regular diet without nausea or vomiting. She is voiding spontaneously. She is ambulating. She has passed flatus, and has not a BM. Vaginal bleeding is mild. She denies fever or chills. Abdominal pain is mild and controlled with oral medications.     Objective:     Vital Signs (Most Recent):  Temp: 99 °F (37.2 °C) (19)  Pulse: 68 (19)  Resp: 18 (19)  BP: 125/74 (19)  SpO2: 99 % (19) Vital Signs (24h Range):  Temp:  [97.8 °F (36.6 °C)-99 °F (37.2 °C)] 99 °F (37.2 °C)  Pulse:  [64-77] 68  Resp:  [14-18] 18  SpO2:  [94 %-99 %] 99 %  BP: ()/(54-74) 125/74     Weight: 64.5 kg (142 lb 3.2 oz)  Body mass index is 26.87 kg/m².      Intake/Output Summary (Last 24 hours) at 2019 1059  Last data filed at 2019 0145  Gross per 24 hour   Intake 350 ml   Output 2750 ml   Net -2400 ml       Significant Labs:  Lab Results   Component Value Date    GROUPTRH O POS 2019    HEPBSAG Negative 2018    STREPBCULT No Group B Streptococcus isolated 2019     Recent Labs   Lab 19  0518   HGB 8.8*   HCT 28.9*       I have personallly reviewed all pertinent lab results from the last 24 hours.    Physical Exam:   Constitutional: She is oriented to person, place, and time. She appears well-developed and well-nourished. No distress.    HENT:   Head: Normocephalic and atraumatic.      Cardiovascular: Normal rate, regular rhythm, normal heart sounds and intact distal pulses.     Pulmonary/Chest: Effort  normal. No respiratory distress.        Abdominal: Soft. Bowel sounds are normal. Abdominal incision: c/d/i. There is no guarding.   Firm fundus below umbilicus     Genitourinary: Uterus normal. No vaginal discharge found.           Musculoskeletal: Normal range of motion and moves all extremeties.       Neurological: She is alert and oriented to person, place, and time.    Skin: Skin is warm. She is not diaphoretic.    Psychiatric: She has a normal mood and affect. Her behavior is normal. Judgment and thought content normal.       Assessment/Plan:     33 y.o. female  for:    *  delivery delivered  Routine post operative care    Acute blood loss anemia  Acute blood loss anemia due to intrapartum blood loss. The patient is asymptomatic of the anemia - no intervention is indicated.  Restart PNV/iron with stool softner on DC.          Disposition: As patient meets milestones, will plan to discharge POD#3/#4.    Rita Centeno MD  Obstetrics  [facility]

## 2019-06-08 PROCEDURE — 99024 POSTOP FOLLOW-UP VISIT: CPT | Mod: ,,, | Performed by: OBSTETRICS & GYNECOLOGY

## 2019-06-08 PROCEDURE — 11000001 HC ACUTE MED/SURG PRIVATE ROOM

## 2019-06-08 PROCEDURE — 99024 PR POST-OP FOLLOW-UP VISIT: ICD-10-PCS | Mod: ,,, | Performed by: OBSTETRICS & GYNECOLOGY

## 2019-06-08 PROCEDURE — 63600175 PHARM REV CODE 636 W HCPCS: Performed by: STUDENT IN AN ORGANIZED HEALTH CARE EDUCATION/TRAINING PROGRAM

## 2019-06-08 PROCEDURE — 25000003 PHARM REV CODE 250: Performed by: OBSTETRICS & GYNECOLOGY

## 2019-06-08 RX ORDER — HYDROCODONE BITARTRATE AND ACETAMINOPHEN 5; 325 MG/1; MG/1
1 TABLET ORAL EVERY 6 HOURS PRN
Status: DISCONTINUED | OUTPATIENT
Start: 2019-06-08 | End: 2019-06-09 | Stop reason: HOSPADM

## 2019-06-08 RX ORDER — HYDROCODONE BITARTRATE AND ACETAMINOPHEN 10; 325 MG/1; MG/1
1 TABLET ORAL EVERY 6 HOURS PRN
Status: DISCONTINUED | OUTPATIENT
Start: 2019-06-08 | End: 2019-06-09 | Stop reason: HOSPADM

## 2019-06-08 RX ADMIN — DOCUSATE SODIUM 200 MG: 100 CAPSULE, LIQUID FILLED ORAL at 08:06

## 2019-06-08 RX ADMIN — IBUPROFEN 600 MG: 600 TABLET ORAL at 11:06

## 2019-06-08 RX ADMIN — IBUPROFEN 600 MG: 600 TABLET ORAL at 02:06

## 2019-06-08 RX ADMIN — HYDROCODONE BITARTRATE AND ACETAMINOPHEN 1 TABLET: 10; 325 TABLET ORAL at 08:06

## 2019-06-08 RX ADMIN — MUPIROCIN 1 G: 20 OINTMENT TOPICAL at 08:06

## 2019-06-08 RX ADMIN — IBUPROFEN 600 MG: 600 TABLET ORAL at 05:06

## 2019-06-08 RX ADMIN — ONDANSETRON 4 MG: 2 INJECTION INTRAMUSCULAR; INTRAVENOUS at 10:06

## 2019-06-08 NOTE — PROGRESS NOTES
[facility]  Obstetrics  Postpartum Progress Note    Patient Name: Olga Prater  MRN: [id number]  Admission Date: 2019  Hospital Length of Stay: 2 days  Attending Physician: Mohan Pastrana MD  Primary Care Provider: Primary Doctor No    Subjective:     Principal Problem: delivery delivered    Hospital course: POD#1: Feeling well, denies complaints  POD#2: Patient notes increased pain, wants to try Norco for pain control.       She is doing well this morning. She is tolerating a regular diet without nausea or vomiting. She is voiding spontaneously. She is ambulating. She has passed flatus, and has not a BM. Vaginal bleeding is mild. She denies fever or chills. Abdominal pain is moderate and controlled with oral medications. She is breastfeeding.     Objective:     Vital Signs (Most Recent):  Temp: 98.6 °F (37 °C) (19)  Pulse: 75 (19)  Resp: 18 (19)  BP: 114/76 (19)  SpO2: 99 % (19) Vital Signs (24h Range):  Temp:  [97.9 °F (36.6 °C)-98.6 °F (37 °C)] 98.6 °F (37 °C)  Pulse:  [60-78] 75  Resp:  [18] 18  SpO2:  [94 %-99 %] 99 %  BP: (101-115)/(59-76) 114/76     Weight: 64.5 kg (142 lb 3.2 oz)  Body mass index is 26.87 kg/m².    No intake or output data in the 24 hours ending 19 0937    Significant Labs:  Lab Results   Component Value Date    GROUPTRH O POS 2019    HEPBSAG Negative 2018    STREPBCULT No Group B Streptococcus isolated 2019     Recent Labs   Lab 19  0518   HGB 8.8*   HCT 28.9*       I have personallly reviewed all pertinent lab results from the last 24 hours.    Physical Exam:   Constitutional: She is oriented to person, place, and time. She appears well-developed and well-nourished. No distress.       Cardiovascular: Normal rate.     Pulmonary/Chest: No respiratory distress.        Abdominal: Soft. She exhibits abdominal incision. She exhibits no distension. There is no tenderness.  There is no rebound and no guarding.   Incision healing well, some ecchymosis, no drainage or erythema     Genitourinary: Uterus normal.           Musculoskeletal: She exhibits no edema or tenderness.       Neurological: She is alert and oriented to person, place, and time.    Skin: Skin is warm and dry.    Psychiatric: She has a normal mood and affect.       Assessment/Plan:     33 y.o. female  for:    *  delivery delivered  Routine post operative care, change pain medication to Norco.     Acute blood loss anemia  Acute blood loss anemia due to intrapartum blood loss. The patient is asymptomatic of the anemia - no intervention is indicated.  Restart PNV/iron with stool softner on DC.          Disposition: As patient meets milestones, will plan to discharge tomorrow.    Deloris Gruber MD  Obstetrics  [facility]

## 2019-06-08 NOTE — PLAN OF CARE
Problem: Adult Inpatient Plan of Care  Goal: Plan of Care Review  Outcome: Ongoing (interventions implemented as appropriate)  VS stable.  Pt ambulating in room without assistance.  Bonding appropriately with infant.  Pt voiding spontaneously.  Pt tolerating PO well and there are no s/s of distress at this time.  Pt's pain well controlled with oral pain medication.  Pt's bleeding has been light throughout shift and fundus is firm.

## 2019-06-08 NOTE — PROGRESS NOTES
VSS. Feeding independently. Ambulating and voiding without difficulty. Pain well controlled with PO pain meds. Incision intact with no signs of infection. No acute events this shift. No additional needs at this time.

## 2019-06-08 NOTE — SUBJECTIVE & OBJECTIVE
Hospital course: POD#1: Feeling well, denies complaints  POD#2: Patient notes increased pain, wants to try Norco for pain control.       She is doing well this morning. She is tolerating a regular diet without nausea or vomiting. She is voiding spontaneously. She is ambulating. She has passed flatus, and has not a BM. Vaginal bleeding is mild. She denies fever or chills. Abdominal pain is moderate and controlled with oral medications. She is breastfeeding.     Objective:     Vital Signs (Most Recent):  Temp: 98.6 °F (37 °C) (06/08/19 0900)  Pulse: 75 (06/08/19 0900)  Resp: 18 (06/08/19 0900)  BP: 114/76 (06/08/19 0900)  SpO2: 99 % (06/08/19 0900) Vital Signs (24h Range):  Temp:  [97.9 °F (36.6 °C)-98.6 °F (37 °C)] 98.6 °F (37 °C)  Pulse:  [60-78] 75  Resp:  [18] 18  SpO2:  [94 %-99 %] 99 %  BP: (101-115)/(59-76) 114/76     Weight: 64.5 kg (142 lb 3.2 oz)  Body mass index is 26.87 kg/m².    No intake or output data in the 24 hours ending 06/08/19 0937    Significant Labs:  Lab Results   Component Value Date    GROUPTRH O POS 06/06/2019    HEPBSAG Negative 12/13/2018    STREPBCULT No Group B Streptococcus isolated 05/21/2019     Recent Labs   Lab 06/07/19  0518   HGB 8.8*   HCT 28.9*       I have personallly reviewed all pertinent lab results from the last 24 hours.    Physical Exam:   Constitutional: She is oriented to person, place, and time. She appears well-developed and well-nourished. No distress.       Cardiovascular: Normal rate.     Pulmonary/Chest: No respiratory distress.        Abdominal: Soft. She exhibits abdominal incision. She exhibits no distension. There is no tenderness. There is no rebound and no guarding.   Incision healing well, some ecchymosis, no drainage or erythema     Genitourinary: Uterus normal.           Musculoskeletal: She exhibits no edema or tenderness.       Neurological: She is alert and oriented to person, place, and time.    Skin: Skin is warm and dry.    Psychiatric: She has a  normal mood and affect.

## 2019-06-08 NOTE — LACTATION NOTE
This note was copied from a baby's chart.  Lactation note:  To room to assist with pumping and feedings. Mom has visitors in room. Left LC phone number on board for her to call when ready.

## 2019-06-09 VITALS
SYSTOLIC BLOOD PRESSURE: 92 MMHG | BODY MASS INDEX: 26.85 KG/M2 | DIASTOLIC BLOOD PRESSURE: 53 MMHG | WEIGHT: 142.19 LBS | RESPIRATION RATE: 18 BRPM | HEART RATE: 70 BPM | HEIGHT: 61 IN | OXYGEN SATURATION: 98 % | TEMPERATURE: 98 F

## 2019-06-09 PROCEDURE — 99024 PR POST-OP FOLLOW-UP VISIT: ICD-10-PCS | Mod: ,,, | Performed by: OBSTETRICS & GYNECOLOGY

## 2019-06-09 PROCEDURE — 25000003 PHARM REV CODE 250: Performed by: OBSTETRICS & GYNECOLOGY

## 2019-06-09 PROCEDURE — 99024 POSTOP FOLLOW-UP VISIT: CPT | Mod: ,,, | Performed by: OBSTETRICS & GYNECOLOGY

## 2019-06-09 RX ORDER — HYDROCODONE BITARTRATE AND ACETAMINOPHEN 5; 325 MG/1; MG/1
1 TABLET ORAL EVERY 6 HOURS PRN
Qty: 45 TABLET | Refills: 0 | Status: SHIPPED | OUTPATIENT
Start: 2019-06-09 | End: 2021-08-19

## 2019-06-09 RX ADMIN — DOCUSATE SODIUM 200 MG: 100 CAPSULE, LIQUID FILLED ORAL at 08:06

## 2019-06-09 RX ADMIN — IBUPROFEN 600 MG: 600 TABLET ORAL at 12:06

## 2019-06-09 RX ADMIN — IBUPROFEN 600 MG: 600 TABLET ORAL at 08:06

## 2019-06-09 RX ADMIN — MUPIROCIN 1 G: 20 OINTMENT TOPICAL at 08:06

## 2019-06-09 NOTE — SUBJECTIVE & OBJECTIVE
Hospital course: POD#1: Feeling well, denies complaints  POD#2: Patient notes increased pain, wants to try Norco for pain control.   POD#3: Feeling well, denies complaints      Interval History:     She is doing well this morning. She is tolerating a regular diet without nausea or vomiting. She is voiding spontaneously. She is ambulating. She has passed flatus, and has a BM. Vaginal bleeding is mild. She denies fever or chills. Abdominal pain is mild and controlled with oral medications.     Objective:     Vital Signs (Most Recent):  Temp: 98.2 °F (36.8 °C) (06/09/19 0000)  Pulse: 70 (06/09/19 0000)  Resp: 18 (06/09/19 0000)  BP: 121/77 (06/09/19 0000)  SpO2: 98 % (06/09/19 0000) Vital Signs (24h Range):  Temp:  [97.8 °F (36.6 °C)-98.7 °F (37.1 °C)] 98.2 °F (36.8 °C)  Pulse:  [] 70  Resp:  [18] 18  SpO2:  [96 %-99 %] 98 %  BP: (112-130)/(60-80) 121/77     Weight: 64.5 kg (142 lb 3.2 oz)  Body mass index is 26.87 kg/m².    No intake or output data in the 24 hours ending 06/09/19 0842    Significant Labs:  Lab Results   Component Value Date    GROUPTRH O POS 06/06/2019    HEPBSAG Negative 12/13/2018    STREPBCULT No Group B Streptococcus isolated 05/21/2019     No results for input(s): HGB, HCT in the last 48 hours.    I have personallly reviewed all pertinent lab results from the last 24 hours.    Physical Exam:   Constitutional: She is oriented to person, place, and time. She appears well-developed and well-nourished. No distress.    HENT:   Head: Normocephalic and atraumatic.      Cardiovascular: Normal rate, regular rhythm, normal heart sounds and intact distal pulses.     Pulmonary/Chest: Effort normal. No respiratory distress.        Abdominal: Soft. Bowel sounds are normal. Abdominal incision: c/d/i. There is no guarding.   Firm fundus below umbilicus     Genitourinary: Uterus normal. No vaginal discharge found.           Musculoskeletal: Normal range of motion and moves all extremeties.        Neurological: She is alert and oriented to person, place, and time.    Skin: Skin is warm. She is not diaphoretic.    Psychiatric: She has a normal mood and affect. Her behavior is normal. Judgment and thought content normal.

## 2019-06-09 NOTE — DISCHARGE SUMMARY
[facility]  Obstetrics  Discharge Summary      Patient Name: Olga Prater  MRN: [id number]  Admission Date: 2019  Hospital Length of Stay: 3 days  Discharge Date and Time:  2019 8:48 AM  Attending Physician: Mohan Villafuerte MD   Discharging Provider: Rita Centeno MD   Primary Care Provider: Primary Doctor No    HPI: Patient underwent uncomplicated repeat CS for history of prior myomectomy        Procedure(s) (LRB):   SECTION (N/A)     Hospital Course:   POD#1: Feeling well, denies complaints  POD#2: Patient notes increased pain, wants to try Norco for pain control.   POD#3: Feeling well, denies complaints       Consults (From admission, onward)        Status Ordering Provider     Inpatient consult to Anesthesiology  Once     Provider:  (Not yet assigned)    Acknowledged MOHAN VILLAFUERTE          Final Active Diagnoses:    Diagnosis Date Noted POA    PRINCIPAL PROBLEM:   delivery delivered [O82] 2019 No    Acute blood loss anemia [D62] 2019 No      Problems Resolved During this Admission:    Diagnosis Date Noted Date Resolved POA    35 weeks gestation of pregnancy [Z3A.35] 2019 Not Applicable    35 weeks gestation of pregnancy [Z3A.35] 2019 Not Applicable        Labs: All labs within the past 24 hours have been reviewed    Feeding Method: breast    Immunizations     Date Immunization Status Dose Route/Site Given by    19 1007 MMR Incomplete 0.5 mL Subcutaneous/Left deltoid     19 1007 Tdap Incomplete 0.5 mL Intramuscular/Left deltoid           Delivery:    Episiotomy: None   Lacerations: None   Repair suture:     Repair # of packets: 8   Blood loss (ml): 0     Birth information:  YOB: 2018   Time of birth: 7:56 AM   Sex: male   Delivery type: , Low Transverse   Gestational Age: 37w5d    Delivery Clinician:      Other providers:       Additional  information:  Forceps:    Vacuum:     Breech:    Observed anomalies      Living?:           APGARS  One minute Five minutes Ten minutes   Skin color:         Heart rate:         Grimace:         Muscle tone:         Breathing:         Totals: 9  9        Placenta: Delivered:       appearance    Pending Diagnostic Studies:     None          Discharged Condition: good    Disposition: Home or Self Care    Follow Up:  Follow-up Information     Mohan Pastrana MD In 2 weeks.    Specialties:  Obstetrics and Gynecology, Obstetrics, Gynecology  Why:  For wound re-check  Contact information:  [address]  [address]  [facility] [id number] [phone]             Mohan Pastrana MD In 6 weeks.    Specialties:  Obstetrics and Gynecology, Obstetrics, Gynecology  Why:  Post partum exam  Contact information:  [address]  [address]  [facility] [id number] [phone]                 Patient Instructions:   Patient Instructions:   1. Call the office for any bleeding >2 pads/hour for >2 hours, temperature >100.4, pain that is uncontrolled with medications, or for any other concerns.  2. Let soap and water run over incision, do not scrub. Keep incision dry.  3. No driving while on narcotics.  4. Call if malodorous drainage or redness from incision  5. Call office for overwhelming feelings of anxiety or sadness greater than 50% of the time  6. If no bowel movement for 1-2 days after discharge home, start Miralax (over the counter - take per package instructions) once a day. If still no bowel movement, increase to twice a day. Decrease to once a day or discontinue once having regular bowel movements.  No discharge procedures on file.  Medications:  Current Discharge Medication List      START taking these medications    Details   docusate sodium (COLACE) 100 MG capsule Take 2 capsules (200 mg total) by mouth 2 (two) times daily.  Qty: 40 capsule, Refills: 0      HYDROcodone-acetaminophen (NORCO) 5-325 mg per tablet Take 1 tablet by mouth every 6 (six) hours as  needed.  Qty: 45 tablet, Refills: 0      ibuprofen (ADVIL,MOTRIN) 600 MG tablet Take 1 tablet (600 mg total) by mouth every 6 (six) hours.  Qty: 60 tablet, Refills: 1      oxyCODONE-acetaminophen (PERCOCET) 5-325 mg per tablet Take 1 tablet by mouth every 4 (four) hours as needed.  Qty: 45 tablet, Refills: 0         CONTINUE these medications which have NOT CHANGED    Details   PNV no.95/ferrous fum/folic ac (PRENATAL ORAL) Take by mouth.         STOP taking these medications       aspirin 81 MG Chew Comments:   Reason for Stopping:               Rita Centeno MD  Obstetrics  [facility]

## 2019-06-09 NOTE — NURSING
Pt ambulating, voiding and passing flatus.  Pt tolerating PO well and there is no s/s of distress at this time.  Pt's pain well controlled thoughout shift with oral pain medication.  Pt's bleeding has been light throughout shift and fundus is firm.  All questions answered.  Pt verbalized understanding to follow up with provider in 2 weeks for wound recheck and in 6 weeks for PP check.  Reviewed s/s of infection, when to call the provider and medication list.  ID band verified.  Pt stable at this time.  No complains.  Awaiting transport to [facility].

## 2019-06-14 ENCOUNTER — TELEPHONE (OUTPATIENT)
Dept: OBSTETRICS AND GYNECOLOGY | Facility: OTHER | Age: 34
End: 2019-06-14

## 2019-12-09 ENCOUNTER — OFFICE VISIT (OUTPATIENT)
Dept: DERMATOLOGY | Facility: CLINIC | Age: 34
End: 2019-12-09
Payer: COMMERCIAL

## 2019-12-09 VITALS — WEIGHT: 142 LBS | BODY MASS INDEX: 26.83 KG/M2

## 2019-12-09 DIAGNOSIS — D22.9 NEVUS OF MULTIPLE SITES: Primary | ICD-10-CM

## 2019-12-09 DIAGNOSIS — Z87.898 HISTORY OF ATYPICAL NEVUS: ICD-10-CM

## 2019-12-09 DIAGNOSIS — D48.5 NEOPLASM OF UNCERTAIN BEHAVIOR OF SKIN: ICD-10-CM

## 2019-12-09 DIAGNOSIS — L81.4 LENTIGINES: ICD-10-CM

## 2019-12-09 PROCEDURE — 11102 PR TANGENTIAL BIOPSY, SKIN, SINGLE LESION: ICD-10-PCS | Mod: S$GLB,,, | Performed by: DERMATOLOGY

## 2019-12-09 PROCEDURE — 99999 PR PBB SHADOW E&M-EST. PATIENT-LVL II: CPT | Mod: PBBFAC,,, | Performed by: DERMATOLOGY

## 2019-12-09 PROCEDURE — 3008F PR BODY MASS INDEX (BMI) DOCUMENTED: ICD-10-PCS | Mod: CPTII,S$GLB,, | Performed by: DERMATOLOGY

## 2019-12-09 PROCEDURE — 88305 TISSUE EXAM BY PATHOLOGIST: CPT | Mod: 26,,, | Performed by: PATHOLOGY

## 2019-12-09 PROCEDURE — 99999 PR PBB SHADOW E&M-EST. PATIENT-LVL II: ICD-10-PCS | Mod: PBBFAC,,, | Performed by: DERMATOLOGY

## 2019-12-09 PROCEDURE — 3008F BODY MASS INDEX DOCD: CPT | Mod: CPTII,S$GLB,, | Performed by: DERMATOLOGY

## 2019-12-09 PROCEDURE — 88342 IMHCHEM/IMCYTCHM 1ST ANTB: CPT | Performed by: PATHOLOGY

## 2019-12-09 PROCEDURE — 88305 TISSUE EXAM BY PATHOLOGIST: CPT | Performed by: PATHOLOGY

## 2019-12-09 PROCEDURE — 99214 OFFICE O/P EST MOD 30 MIN: CPT | Mod: 25,S$GLB,, | Performed by: DERMATOLOGY

## 2019-12-09 PROCEDURE — 99214 PR OFFICE/OUTPT VISIT, EST, LEVL IV, 30-39 MIN: ICD-10-PCS | Mod: 25,S$GLB,, | Performed by: DERMATOLOGY

## 2019-12-09 PROCEDURE — 88342 CHG IMMUNOCYTOCHEMISTRY: ICD-10-PCS | Mod: 26,,, | Performed by: PATHOLOGY

## 2019-12-09 PROCEDURE — 88342 IMHCHEM/IMCYTCHM 1ST ANTB: CPT | Mod: 26,,, | Performed by: PATHOLOGY

## 2019-12-09 PROCEDURE — 88305 TISSUE EXAM BY PATHOLOGIST: ICD-10-PCS | Mod: 26,,, | Performed by: PATHOLOGY

## 2019-12-09 PROCEDURE — 11102 TANGNTL BX SKIN SINGLE LES: CPT | Mod: S$GLB,,, | Performed by: DERMATOLOGY

## 2019-12-09 NOTE — PROGRESS NOTES
"  Subjective:       Patient ID:  Olga Prater is a 34 y.o. female who presents for   Chief Complaint   Patient presents with    Skin Check     TBSE    Mole     right chest     Hx of atypic la nevus:  Notes recorded by Lilliam Townsend MD on 3/8/2018 at 4:30 PM CST  1. 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  - MARGINS ARE NEGATIVE IN THE PLANES OF SECTION.     2. Skin, mid upper back, shave biopsy:  - MELANOCYTIC NEVUS, COMPOUND TYPE WITH ARCHITECTURAL DISORDER AND MILD CYTOLOGIC  ATYPIA (MENDEZ'S NEVUS).  - MARGINS ARE NEGATIVE IN THE PLANES OF SECTION      Also"History of dysplastic nevus  Left  Upper arm hx of mildly atypical nevus with 2mm tan recurrence centrally . Regular appearing. Will monitor   Area of previous atypical nevus/nevi examined. Site well healed"       Here for skin check no lesions of concern.     Mole  - Initial  Affected locations: chest, back, right lower leg, left lower leg, left upper leg, right upper leg, left arm, right arm and face  Aggravated by: nothing  Relieving factors/Treatments tried: nothing        Review of Systems   Constitutional: Negative for fever, chills, weight loss, weight gain, fatigue, night sweats and malaise.   Skin: Negative for daily sunscreen use, activity-related sunscreen use and wears hat.   Hematologic/Lymphatic: Does not bruise/bleed easily.        Objective:    Physical Exam   Constitutional: She appears well-developed and well-nourished. No distress.   Neurological: She is alert and oriented to person, place, and time. She is not disoriented.   Psychiatric: She has a normal mood and affect.   Skin:   Areas Examined (abnormalities noted in diagram):   Scalp / Hair Palpated and Inspected  Head / Face Inspection Performed  Neck Inspection Performed  Chest / Axilla Inspection Performed  Abdomen Inspection Performed  Genitals / " Buttocks / Groin Inspection Performed  Back Inspection Performed  RUE Inspected  LUE Inspection Performed  RLE Inspected  LLE Inspection Performed  Nails and Digits Inspection Performed                       Diagram Legend     Erythematous scaling macule/papule c/w actinic keratosis       Vascular papule c/w angioma      Pigmented verrucoid papule/plaque c/w seborrheic keratosis      Yellow umbilicated papule c/w sebaceous hyperplasia      Irregularly shaped tan macule c/w lentigo     1-2 mm smooth white papules consistent with Milia      Movable subcutaneous cyst with punctum c/w epidermal inclusion cyst      Subcutaneous movable cyst c/w pilar cyst      Firm pink to brown papule c/w dermatofibroma      Pedunculated fleshy papule(s) c/w skin tag(s)      Evenly pigmented macule c/w junctional nevus     Mildly variegated pigmented, slightly irregular-bordered macule c/w mildly atypical nevus      Flesh colored to evenly pigmented papule c/w intradermal nevus       Pink pearly papule/plaque c/w basal cell carcinoma      Erythematous hyperkeratotic cursted plaque c/w SCC      Surgical scar with no sign of skin cancer recurrence      Open and closed comedones      Inflammatory papules and pustules      Verrucoid papule consistent consistent with wart     Erythematous eczematous patches and plaques     Dystrophic onycholytic nail with subungual debris c/w onychomycosis     Umbilicated papule    Erythematous-base heme-crusted tan verrucoid plaque consistent with inflamed seborrheic keratosis     Erythematous Silvery Scaling Plaque c/w Psoriasis     See annotation      Assessment / Plan:      Pathology Orders:     Normal Orders This Visit    Specimen to Pathology, Dermatology     Questions:    Procedure Type:  Dermatology and skin neoplasms    Number of Specimens:  1    ------------------------:  -------------------------    Spec 1 Procedure:  Biopsy    Spec 1 Clinical Impression:  nevus    Spec 1 Source:  right mid back  "       Nevus of multiple sites  ,abc  ,bro    History of atypical nevus  See hpi    Lentigines  The "ABCD" rules to observe pigmented lesions were reviewed.      Neoplasm of uncertain behavior of skin  -     Specimen to Pathology, Dermatology  Shave removal procedure note:    Shave removal performed after verbal consent including risk of infection, scar, recurrence, need for additional treatment of site. Area prepped with alcohol, anesthetized 1% lidocaine with epinephrine. Lesional tissue shaved. Lesion defect size 3mm No complications. Dressing applied. Wound care explained.                     Follow up in about 1 year (around 12/9/2020).  "

## 2019-12-24 LAB
FINAL PATHOLOGIC DIAGNOSIS: NORMAL
GROSS: NORMAL
MICROSCOPIC EXAM: NORMAL

## 2020-01-01 NOTE — PROGRESS NOTES
Here for routine OB appt at 25w3d, with no complaints.   Denies VB and cramping.    Pain, bleeding, and PTL precautions given.  F/U scheduled 4 weeks  tdap next visit.  Gtt/cbc today.     Ck Spivey)  2020 00:38:09

## 2020-01-29 ENCOUNTER — TELEPHONE (OUTPATIENT)
Dept: DERMATOLOGY | Facility: CLINIC | Age: 35
End: 2020-01-29

## 2020-01-29 NOTE — TELEPHONE ENCOUNTER
----- Message from Taya Bernstein LPN sent at 1/29/2020 10:07 AM CST -----  Contact: pt at [phone] cell  This is a Ray pt. Not sure what she is getting a punch bx for. Apparently she was suppose to be following up with her.      ----- Message -----  From: Jacinta Gonzalez  Sent: 1/29/2020   9:12 AM CST  To: Alec Michael pt -Pt has appt  Feb. 26 and has changed her appt several times.  Wants to be seen in the next 2 weeks because she starts a [employer] job in Feb. And may not be able to get off.  Pt is an employee and works at [employer].

## 2020-02-12 ENCOUNTER — OFFICE VISIT (OUTPATIENT)
Dept: DERMATOLOGY | Facility: CLINIC | Age: 35
End: 2020-02-12
Payer: COMMERCIAL

## 2020-02-12 DIAGNOSIS — D22.9 NEVUS: ICD-10-CM

## 2020-02-12 DIAGNOSIS — D23.5 DYSPLASTIC NEVUS OF TRUNK: Primary | ICD-10-CM

## 2020-02-12 PROCEDURE — 99212 PR OFFICE/OUTPT VISIT, EST, LEVL II, 10-19 MIN: ICD-10-PCS | Mod: 25,S$GLB,, | Performed by: DERMATOLOGY

## 2020-02-12 PROCEDURE — 11104 PUNCH BX SKIN SINGLE LESION: CPT | Mod: S$GLB,,, | Performed by: DERMATOLOGY

## 2020-02-12 PROCEDURE — 88305 TISSUE EXAM BY PATHOLOGIST: CPT | Mod: 26,,, | Performed by: PATHOLOGY

## 2020-02-12 PROCEDURE — 99999 PR PBB SHADOW E&M-EST. PATIENT-LVL II: CPT | Mod: PBBFAC,,, | Performed by: DERMATOLOGY

## 2020-02-12 PROCEDURE — 99212 OFFICE O/P EST SF 10 MIN: CPT | Mod: 25,S$GLB,, | Performed by: DERMATOLOGY

## 2020-02-12 PROCEDURE — 88305 TISSUE EXAM BY PATHOLOGIST: ICD-10-PCS | Mod: 26,,, | Performed by: PATHOLOGY

## 2020-02-12 PROCEDURE — 99999 PR PBB SHADOW E&M-EST. PATIENT-LVL II: ICD-10-PCS | Mod: PBBFAC,,, | Performed by: DERMATOLOGY

## 2020-02-12 PROCEDURE — 88305 TISSUE EXAM BY PATHOLOGIST: CPT | Performed by: PATHOLOGY

## 2020-02-12 PROCEDURE — 11104 PR PUNCH BIOPSY, SKIN, SINGLE LESION: ICD-10-PCS | Mod: S$GLB,,, | Performed by: DERMATOLOGY

## 2020-02-12 NOTE — PROGRESS NOTES
"  Subjective:       Patient ID:  Olga Prater is a 34 y.o. female who presents for   Chief Complaint   Patient presents with    Lesion     Pt here today for lesion on left upper back, bx per Dr. Moy and here for repeat bx.    Also has mole on right cheek. Feels maybe the color is different. Present for years . Would like mole checked     Skin, right mid back, shave biopsy:  - COMPOUND DYSPLASTIC NEVUS WITH MODERATE ATYPIA AND PIGMENT INCONTINENT.  - EXTENDING TO ONE LATERAL BIOPSY EDGE AND NARROWLY EXCISED ON THE DEEP BIOPSY  EDGE.  - LEVELS, IMMUOMSTAIN FOR MART1 (+) AND POSITIVE CONTROL EXAMINED.  Patient ID/pathology ID: [id number]  Received in formalin labeled "right mid back" is a shave of pigmented skin measuring 7 x 3 mm. Deep  surfaces inked blue and submitted entirely in cassette ZXS--1-A  Grossed by YOUSIF Quinonez S transfer text  BOB MOY  There is melanocytic proliferation in the dermis and at the dermoepidermal junction with bridging  of adjacent rete ridges and lamellar fibrosis around junctional nests. The junctional component  extends far beyond the dermal component. The melanocytes demonstrate moderate cytologic  atypia.  SARITA WHITTAKER M.D. 12/24/2019 15:10      Review of Systems   Constitutional: Negative for fever, chills, fatigue and malaise.   Skin: Positive for daily sunscreen use and activity-related sunscreen use. Negative for recent sunburn.   Hematologic/Lymphatic: Does not bruise/bleed easily.        Objective:    Physical Exam   Constitutional: She appears well-developed and well-nourished.   Neurological: She is alert and oriented to person, place, and time.   Psychiatric: She has a normal mood and affect.   Skin:   Areas Examined (abnormalities noted in diagram):   Head / Face Inspection Performed  Back Inspection Performed                   Diagram Legend     Erythematous scaling macule/papule c/w actinic keratosis       Vascular papule c/w angioma      " Pigmented verrucoid papule/plaque c/w seborrheic keratosis      Yellow umbilicated papule c/w sebaceous hyperplasia      Irregularly shaped tan macule c/w lentigo     1-2 mm smooth white papules consistent with Milia      Movable subcutaneous cyst with punctum c/w epidermal inclusion cyst      Subcutaneous movable cyst c/w pilar cyst      Firm pink to brown papule c/w dermatofibroma      Pedunculated fleshy papule(s) c/w skin tag(s)      Evenly pigmented macule c/w junctional nevus     Mildly variegated pigmented, slightly irregular-bordered macule c/w mildly atypical nevus      Flesh colored to evenly pigmented papule c/w intradermal nevus       Pink pearly papule/plaque c/w basal cell carcinoma      Erythematous hyperkeratotic cursted plaque c/w SCC      Surgical scar with no sign of skin cancer recurrence      Open and closed comedones      Inflammatory papules and pustules      Verrucoid papule consistent consistent with wart     Erythematous eczematous patches and plaques     Dystrophic onycholytic nail with subungual debris c/w onychomycosis     Umbilicated papule    Erythematous-base heme-crusted tan verrucoid plaque consistent with inflamed seborrheic keratosis     Erythematous Silvery Scaling Plaque c/w Psoriasis     See annotation      Assessment / Plan:      Pathology Orders:     Normal Orders This Visit    Specimen to Pathology, Dermatology     Comments:    Number of Specimens:->1  ------------------------->-------------------------  Spec 1 Procedure:->Biopsy  Spec 1 Clinical Impression:->atypical nevus check margins  Spec 1 Source:->right mid back    Questions:    Procedure Type:  Dermatology and skin neoplasms    Number of Specimens:  1    ------------------------:  -------------------------    Spec 1 Procedure:  Biopsy    Spec 1 Clinical Impression:  atypical nevus check margins    Spec 1 Source:  right mid back        Dysplastic nevus of trunk  Punch biopsy procedure note:  Punch biopsy performed  after verbal consent obtained. Area marked and prepped with alcohol. Approximately 1cc of 1% lidocaine with epinephrine injected. 8 mm disposable punch used to remove lesion. Hemostasis obtained and biopsy site closed with 1 - 2 Prolene sutures. Wound care instructions reviewed with patient and handout given.    -     Specimen to Pathology, Dermatology    NEVUS  Recheck nevus on right cheek- appears to be 2 nevi with  MED brown pigment globules           Follow up in about 2 weeks (around 2/26/2020). for s/r and 6months mole check

## 2020-02-18 LAB
FINAL PATHOLOGIC DIAGNOSIS: NORMAL
GROSS: NORMAL
MICROSCOPIC EXAM: NORMAL

## 2020-02-19 ENCOUNTER — PATIENT MESSAGE (OUTPATIENT)
Dept: DERMATOLOGY | Facility: CLINIC | Age: 35
End: 2020-02-19

## 2020-04-15 ENCOUNTER — PATIENT MESSAGE (OUTPATIENT)
Dept: OBSTETRICS AND GYNECOLOGY | Facility: CLINIC | Age: 35
End: 2020-04-15

## 2020-04-17 ENCOUNTER — PATIENT MESSAGE (OUTPATIENT)
Dept: OBSTETRICS AND GYNECOLOGY | Facility: CLINIC | Age: 35
End: 2020-04-17

## 2020-04-17 RX ORDER — DROSPIRENONE, ETHINYL ESTRADIOL AND LEVOMEFOLATE CALCIUM AND LEVOMEFOLATE CALCIUM 3-0.02(24)
1 KIT ORAL DAILY
Qty: 84 TABLET | Refills: 3 | Status: SHIPPED | OUTPATIENT
Start: 2020-04-17 | End: 2021-06-02 | Stop reason: SDUPTHER

## 2020-04-21 DIAGNOSIS — Z01.84 ANTIBODY RESPONSE EXAMINATION: ICD-10-CM

## 2020-04-22 ENCOUNTER — LAB VISIT (OUTPATIENT)
Dept: LAB | Facility: OTHER | Age: 35
End: 2020-04-22
Payer: COMMERCIAL

## 2020-04-22 DIAGNOSIS — Z01.84 ANTIBODY RESPONSE EXAMINATION: ICD-10-CM

## 2020-04-22 LAB — SARS-COV-2 IGG SERPL QL IA: NEGATIVE

## 2020-04-22 PROCEDURE — 86769 SARS-COV-2 COVID-19 ANTIBODY: CPT

## 2020-04-22 PROCEDURE — 36415 COLL VENOUS BLD VENIPUNCTURE: CPT

## 2021-02-02 ENCOUNTER — PATIENT MESSAGE (OUTPATIENT)
Dept: OBSTETRICS AND GYNECOLOGY | Facility: CLINIC | Age: 36
End: 2021-02-02

## 2021-02-02 DIAGNOSIS — N63.20 BREAST MASS, LEFT: Primary | ICD-10-CM

## 2021-02-05 ENCOUNTER — HOSPITAL ENCOUNTER (OUTPATIENT)
Dept: RADIOLOGY | Facility: HOSPITAL | Age: 36
Discharge: HOME | End: 2021-02-05
Attending: OBSTETRICS & GYNECOLOGY
Payer: COMMERCIAL

## 2021-02-05 DIAGNOSIS — N63.20 BREAST MASS, LEFT: ICD-10-CM

## 2021-02-05 PROCEDURE — 76642 US BREAST LEFT LIMITED: ICD-10-PCS | Mod: 26,LT,, | Performed by: RADIOLOGY

## 2021-02-05 PROCEDURE — 76642 ULTRASOUND BREAST LIMITED: CPT | Mod: TC,LT

## 2021-02-05 PROCEDURE — 76642 ULTRASOUND BREAST LIMITED: CPT | Mod: 26,LT,, | Performed by: RADIOLOGY

## 2021-02-05 PROCEDURE — 77066 DX MAMMO INCL CAD BI: CPT | Mod: 26,,, | Performed by: RADIOLOGY

## 2021-02-05 PROCEDURE — 77066 MAMMO DIGITAL DIAGNOSTIC BILAT WITH TOMO: ICD-10-PCS | Mod: 26,,, | Performed by: RADIOLOGY

## 2021-02-05 PROCEDURE — G0279 MAMMO DIGITAL DIAGNOSTIC BILAT WITH TOMO: ICD-10-PCS | Mod: 26,,, | Performed by: RADIOLOGY

## 2021-02-05 PROCEDURE — 77062 BREAST TOMOSYNTHESIS BI: CPT | Mod: TC

## 2021-02-05 PROCEDURE — G0279 TOMOSYNTHESIS, MAMMO: HCPCS | Mod: 26,,, | Performed by: RADIOLOGY

## 2021-02-08 ENCOUNTER — TELEPHONE (OUTPATIENT)
Dept: RADIOLOGY | Facility: HOSPITAL | Age: 36
End: 2021-02-08

## 2021-02-22 ENCOUNTER — HOSPITAL ENCOUNTER (OUTPATIENT)
Dept: RADIOLOGY | Facility: HOSPITAL | Age: 36
Discharge: HOME | End: 2021-02-22
Attending: OBSTETRICS & GYNECOLOGY
Payer: COMMERCIAL

## 2021-02-22 DIAGNOSIS — R92.8 ABNORMAL MAMMOGRAM: ICD-10-CM

## 2021-02-22 PROCEDURE — 27201068 US BREAST BIOPSY WITH IMAGING 1ST SITE LEFT

## 2021-02-22 PROCEDURE — 88305 TISSUE EXAM BY PATHOLOGIST: CPT | Performed by: PATHOLOGY

## 2021-02-22 PROCEDURE — 77065 DX MAMMO INCL CAD UNI: CPT | Mod: 26,LT,, | Performed by: RADIOLOGY

## 2021-02-22 PROCEDURE — 19083 US BREAST BIOPSY WITH IMAGING 1ST SITE LEFT: ICD-10-PCS | Mod: LT,,, | Performed by: RADIOLOGY

## 2021-02-22 PROCEDURE — 25000003 PHARM REV CODE 250: Performed by: OBSTETRICS & GYNECOLOGY

## 2021-02-22 PROCEDURE — 77065 MAMMO DIGITAL DIAGNOSTIC LEFT: ICD-10-PCS | Mod: 26,LT,, | Performed by: RADIOLOGY

## 2021-02-22 PROCEDURE — 88305 TISSUE EXAM BY PATHOLOGIST: CPT | Mod: 26,,, | Performed by: PATHOLOGY

## 2021-02-22 PROCEDURE — 77065 DX MAMMO INCL CAD UNI: CPT | Mod: TC,LT

## 2021-02-22 PROCEDURE — 88305 TISSUE EXAM BY PATHOLOGIST: ICD-10-PCS | Mod: 26,,, | Performed by: PATHOLOGY

## 2021-02-22 PROCEDURE — 19083 BX BREAST 1ST LESION US IMAG: CPT | Mod: LT,,, | Performed by: RADIOLOGY

## 2021-02-22 RX ORDER — SODIUM BICARBONATE 42 MG/ML
2.5 INJECTION, SOLUTION INTRAVENOUS ONCE
Status: COMPLETED | OUTPATIENT
Start: 2021-02-22 | End: 2021-02-22

## 2021-02-22 RX ORDER — LIDOCAINE HYDROCHLORIDE AND EPINEPHRINE 20; 10 MG/ML; UG/ML
50 INJECTION, SOLUTION INFILTRATION; PERINEURAL ONCE
Status: COMPLETED | OUTPATIENT
Start: 2021-02-22 | End: 2021-02-22

## 2021-02-22 RX ORDER — LIDOCAINE HYDROCHLORIDE 10 MG/ML
5 INJECTION, SOLUTION EPIDURAL; INFILTRATION; INTRACAUDAL; PERINEURAL ONCE
Status: COMPLETED | OUTPATIENT
Start: 2021-02-22 | End: 2021-02-22

## 2021-02-22 RX ADMIN — LIDOCAINE HYDROCHLORIDE,EPINEPHRINE BITARTRATE 8 ML: 20; .01 INJECTION, SOLUTION INFILTRATION; PERINEURAL at 03:02

## 2021-02-22 RX ADMIN — SODIUM BICARBONATE 3 ML: 42 INJECTION, SOLUTION INTRAVENOUS at 03:02

## 2021-02-22 RX ADMIN — LIDOCAINE HYDROCHLORIDE 2 ML: 10 INJECTION, SOLUTION EPIDURAL; INFILTRATION; INTRACAUDAL; PERINEURAL at 03:02

## 2021-02-24 ENCOUNTER — TELEPHONE (OUTPATIENT)
Dept: SURGERY | Facility: CLINIC | Age: 36
End: 2021-02-24

## 2021-02-24 LAB
COMMENT: NORMAL
FINAL PATHOLOGIC DIAGNOSIS: NORMAL
GROSS: NORMAL

## 2021-02-25 ENCOUNTER — TELEPHONE (OUTPATIENT)
Dept: SURGERY | Facility: CLINIC | Age: 36
End: 2021-02-25

## 2021-03-23 ENCOUNTER — TELEPHONE (OUTPATIENT)
Dept: OBSTETRICS AND GYNECOLOGY | Facility: CLINIC | Age: 36
End: 2021-03-23

## 2021-03-23 DIAGNOSIS — G43.909 MIGRAINE WITHOUT STATUS MIGRAINOSUS, NOT INTRACTABLE, UNSPECIFIED MIGRAINE TYPE: Primary | ICD-10-CM

## 2021-03-23 RX ORDER — SUMATRIPTAN 50 MG/1
50 TABLET, FILM COATED ORAL ONCE AS NEEDED
Qty: 30 TABLET | Refills: 2 | Status: SHIPPED | OUTPATIENT
Start: 2021-03-23 | End: 2021-08-19 | Stop reason: SDUPTHER

## 2021-03-24 ENCOUNTER — IMMUNIZATION (OUTPATIENT)
Dept: INTERNAL MEDICINE | Facility: CLINIC | Age: 36
End: 2021-03-24
Payer: COMMERCIAL

## 2021-03-24 DIAGNOSIS — Z23 NEED FOR VACCINATION: Primary | ICD-10-CM

## 2021-03-24 PROCEDURE — 0011A COVID-19, MRNA, LNP-S, PF, 100 MCG/0.5 ML DOSE VACCINE: CPT | Mod: PBBFAC | Performed by: INTERNAL MEDICINE

## 2021-04-21 ENCOUNTER — IMMUNIZATION (OUTPATIENT)
Dept: INTERNAL MEDICINE | Facility: CLINIC | Age: 36
End: 2021-04-21
Payer: COMMERCIAL

## 2021-04-21 DIAGNOSIS — Z23 NEED FOR VACCINATION: Primary | ICD-10-CM

## 2021-04-21 PROCEDURE — 0012A COVID-19, MRNA, LNP-S, PF, 100 MCG/0.5 ML DOSE VACCINE: CPT | Mod: PBBFAC | Performed by: INTERNAL MEDICINE

## 2021-05-28 ENCOUNTER — PATIENT MESSAGE (OUTPATIENT)
Dept: OBSTETRICS AND GYNECOLOGY | Facility: CLINIC | Age: 36
End: 2021-05-28

## 2021-05-28 RX ORDER — DROSPIRENONE, ETHINYL ESTRADIOL AND LEVOMEFOLATE CALCIUM AND LEVOMEFOLATE CALCIUM 3-0.02(24)
1 KIT ORAL DAILY
Qty: 84 TABLET | Refills: 3 | OUTPATIENT
Start: 2021-05-28 | End: 2022-05-28

## 2021-06-03 RX ORDER — DROSPIRENONE, ETHINYL ESTRADIOL AND LEVOMEFOLATE CALCIUM AND LEVOMEFOLATE CALCIUM 3-0.02(24)
1 KIT ORAL DAILY
Qty: 84 TABLET | Refills: 3 | OUTPATIENT
Start: 2021-06-03 | End: 2022-06-03

## 2021-06-03 RX ORDER — DROSPIRENONE, ETHINYL ESTRADIOL AND LEVOMEFOLATE CALCIUM AND LEVOMEFOLATE CALCIUM 3-0.02(24)
1 KIT ORAL DAILY
Qty: 84 TABLET | Refills: 0 | Status: SHIPPED | OUTPATIENT
Start: 2021-06-03 | End: 2021-08-16 | Stop reason: SDUPTHER

## 2021-08-05 ENCOUNTER — CLINICAL SUPPORT (OUTPATIENT)
Dept: URGENT CARE | Facility: CLINIC | Age: 36
End: 2021-08-05
Payer: COMMERCIAL

## 2021-08-05 DIAGNOSIS — J02.9 SORE THROAT: Primary | ICD-10-CM

## 2021-08-05 LAB
CTP QC/QA: YES
SARS-COV-2 RDRP RESP QL NAA+PROBE: NEGATIVE

## 2021-08-05 PROCEDURE — U0002 COVID-19 LAB TEST NON-CDC: HCPCS | Mod: QW,S$GLB,, | Performed by: EMERGENCY MEDICINE

## 2021-08-05 PROCEDURE — U0002: ICD-10-PCS | Mod: QW,S$GLB,, | Performed by: EMERGENCY MEDICINE

## 2021-08-16 ENCOUNTER — PATIENT MESSAGE (OUTPATIENT)
Dept: OBSTETRICS AND GYNECOLOGY | Facility: CLINIC | Age: 36
End: 2021-08-16

## 2021-08-16 RX ORDER — DROSPIRENONE, ETHINYL ESTRADIOL AND LEVOMEFOLATE CALCIUM AND LEVOMEFOLATE CALCIUM 3-0.02(24)
1 KIT ORAL DAILY
Qty: 112 TABLET | Refills: 0 | Status: SHIPPED | OUTPATIENT
Start: 2021-08-16 | End: 2021-08-19 | Stop reason: SDUPTHER

## 2021-08-18 ENCOUNTER — TELEPHONE (OUTPATIENT)
Dept: OBSTETRICS AND GYNECOLOGY | Facility: CLINIC | Age: 36
End: 2021-08-18

## 2021-08-19 ENCOUNTER — OFFICE VISIT (OUTPATIENT)
Dept: OBSTETRICS AND GYNECOLOGY | Facility: CLINIC | Age: 36
End: 2021-08-19
Attending: OBSTETRICS & GYNECOLOGY
Payer: COMMERCIAL

## 2021-08-19 VITALS
SYSTOLIC BLOOD PRESSURE: 114 MMHG | HEIGHT: 61 IN | WEIGHT: 110.44 LBS | DIASTOLIC BLOOD PRESSURE: 72 MMHG | BODY MASS INDEX: 20.85 KG/M2

## 2021-08-19 DIAGNOSIS — Z30.9 ENCOUNTER FOR CONTRACEPTIVE MANAGEMENT, UNSPECIFIED TYPE: ICD-10-CM

## 2021-08-19 DIAGNOSIS — G43.909 MIGRAINE WITHOUT STATUS MIGRAINOSUS, NOT INTRACTABLE, UNSPECIFIED MIGRAINE TYPE: ICD-10-CM

## 2021-08-19 DIAGNOSIS — R10.2 PELVIC PAIN: ICD-10-CM

## 2021-08-19 DIAGNOSIS — Z11.51 SCREENING FOR HPV (HUMAN PAPILLOMAVIRUS): ICD-10-CM

## 2021-08-19 DIAGNOSIS — Z01.419 ENCOUNTER FOR GYNECOLOGICAL EXAMINATION: Primary | ICD-10-CM

## 2021-08-19 DIAGNOSIS — N63.20 LEFT BREAST MASS: ICD-10-CM

## 2021-08-19 DIAGNOSIS — Z12.4 SCREENING FOR CERVICAL CANCER: ICD-10-CM

## 2021-08-19 PROCEDURE — 88175 CYTOPATH C/V AUTO FLUID REDO: CPT | Performed by: OBSTETRICS & GYNECOLOGY

## 2021-08-19 PROCEDURE — 99999 PR PBB SHADOW E&M-EST. PATIENT-LVL III: ICD-10-PCS | Mod: PBBFAC,,, | Performed by: OBSTETRICS & GYNECOLOGY

## 2021-08-19 PROCEDURE — 3074F PR MOST RECENT SYSTOLIC BLOOD PRESSURE < 130 MM HG: ICD-10-PCS | Mod: CPTII,S$GLB,, | Performed by: OBSTETRICS & GYNECOLOGY

## 2021-08-19 PROCEDURE — 99395 PR PREVENTIVE VISIT,EST,18-39: ICD-10-PCS | Mod: S$GLB,,, | Performed by: OBSTETRICS & GYNECOLOGY

## 2021-08-19 PROCEDURE — 3078F PR MOST RECENT DIASTOLIC BLOOD PRESSURE < 80 MM HG: ICD-10-PCS | Mod: CPTII,S$GLB,, | Performed by: OBSTETRICS & GYNECOLOGY

## 2021-08-19 PROCEDURE — 1126F PR PAIN SEVERITY QUANTIFIED, NO PAIN PRESENT: ICD-10-PCS | Mod: CPTII,S$GLB,, | Performed by: OBSTETRICS & GYNECOLOGY

## 2021-08-19 PROCEDURE — 99999 PR PBB SHADOW E&M-EST. PATIENT-LVL III: CPT | Mod: PBBFAC,,, | Performed by: OBSTETRICS & GYNECOLOGY

## 2021-08-19 PROCEDURE — 3008F PR BODY MASS INDEX (BMI) DOCUMENTED: ICD-10-PCS | Mod: CPTII,S$GLB,, | Performed by: OBSTETRICS & GYNECOLOGY

## 2021-08-19 PROCEDURE — 1159F MED LIST DOCD IN RCRD: CPT | Mod: CPTII,S$GLB,, | Performed by: OBSTETRICS & GYNECOLOGY

## 2021-08-19 PROCEDURE — 3078F DIAST BP <80 MM HG: CPT | Mod: CPTII,S$GLB,, | Performed by: OBSTETRICS & GYNECOLOGY

## 2021-08-19 PROCEDURE — 3074F SYST BP LT 130 MM HG: CPT | Mod: CPTII,S$GLB,, | Performed by: OBSTETRICS & GYNECOLOGY

## 2021-08-19 PROCEDURE — 1160F PR REVIEW ALL MEDS BY PRESCRIBER/CLIN PHARMACIST DOCUMENTED: ICD-10-PCS | Mod: CPTII,S$GLB,, | Performed by: OBSTETRICS & GYNECOLOGY

## 2021-08-19 PROCEDURE — 1160F RVW MEDS BY RX/DR IN RCRD: CPT | Mod: CPTII,S$GLB,, | Performed by: OBSTETRICS & GYNECOLOGY

## 2021-08-19 PROCEDURE — 1159F PR MEDICATION LIST DOCUMENTED IN MEDICAL RECORD: ICD-10-PCS | Mod: CPTII,S$GLB,, | Performed by: OBSTETRICS & GYNECOLOGY

## 2021-08-19 PROCEDURE — 87624 HPV HI-RISK TYP POOLED RSLT: CPT | Performed by: OBSTETRICS & GYNECOLOGY

## 2021-08-19 PROCEDURE — 1126F AMNT PAIN NOTED NONE PRSNT: CPT | Mod: CPTII,S$GLB,, | Performed by: OBSTETRICS & GYNECOLOGY

## 2021-08-19 PROCEDURE — 99395 PREV VISIT EST AGE 18-39: CPT | Mod: S$GLB,,, | Performed by: OBSTETRICS & GYNECOLOGY

## 2021-08-19 PROCEDURE — 3008F BODY MASS INDEX DOCD: CPT | Mod: CPTII,S$GLB,, | Performed by: OBSTETRICS & GYNECOLOGY

## 2021-08-19 RX ORDER — SUMATRIPTAN 50 MG/1
50 TABLET, FILM COATED ORAL ONCE AS NEEDED
Qty: 30 TABLET | Refills: 2 | Status: SHIPPED | OUTPATIENT
Start: 2021-08-19 | End: 2022-10-11 | Stop reason: SDUPTHER

## 2021-08-19 RX ORDER — DROSPIRENONE, ETHINYL ESTRADIOL AND LEVOMEFOLATE CALCIUM AND LEVOMEFOLATE CALCIUM 3-0.02(24)
1 KIT ORAL DAILY
Qty: 112 TABLET | Refills: 4 | Status: SHIPPED | OUTPATIENT
Start: 2021-08-19 | End: 2022-11-03 | Stop reason: SDUPTHER

## 2021-08-26 LAB
HPV HR 12 DNA SPEC QL NAA+PROBE: NEGATIVE
HPV16 AG SPEC QL: NEGATIVE
HPV18 DNA SPEC QL NAA+PROBE: NEGATIVE

## 2021-09-08 LAB
FINAL PATHOLOGIC DIAGNOSIS: NORMAL
Lab: NORMAL

## 2022-01-10 ENCOUNTER — HOSPITAL ENCOUNTER (OUTPATIENT)
Dept: RADIOLOGY | Facility: OTHER | Age: 37
Discharge: HOME | End: 2022-01-10
Attending: OBSTETRICS & GYNECOLOGY
Payer: COMMERCIAL

## 2022-01-10 DIAGNOSIS — S69.91XA INJURY OF RIGHT HAND, INITIAL ENCOUNTER: ICD-10-CM

## 2022-01-10 DIAGNOSIS — S69.91XA INJURY OF RIGHT HAND, INITIAL ENCOUNTER: Primary | ICD-10-CM

## 2022-01-10 PROCEDURE — 73130 XR HAND COMPLETE 3 VIEW RIGHT: ICD-10-PCS | Mod: 26,RT,, | Performed by: RADIOLOGY

## 2022-01-10 PROCEDURE — 73130 X-RAY EXAM OF HAND: CPT | Mod: TC,FY,RT

## 2022-01-10 PROCEDURE — 73130 X-RAY EXAM OF HAND: CPT | Mod: 26,RT,, | Performed by: RADIOLOGY

## 2022-01-18 ENCOUNTER — PATIENT MESSAGE (OUTPATIENT)
Dept: PRIMARY CARE CLINIC | Facility: CLINIC | Age: 37
End: 2022-01-18
Payer: COMMERCIAL

## 2022-01-18 DIAGNOSIS — R50.9 FEVER, UNSPECIFIED: Primary | ICD-10-CM

## 2022-01-18 DIAGNOSIS — R51.9 NONINTRACTABLE HEADACHE, UNSPECIFIED CHRONICITY PATTERN, UNSPECIFIED HEADACHE TYPE: ICD-10-CM

## 2022-01-18 DIAGNOSIS — R05.9 COUGH: ICD-10-CM

## 2022-01-18 DIAGNOSIS — R68.83 CHILLS: ICD-10-CM

## 2022-01-18 LAB
CTP QC/QA: YES
SARS-COV-2 AG RESP QL IA.RAPID: POSITIVE

## 2022-01-18 PROCEDURE — 87811 SARS-COV-2 COVID19 W/OPTIC: CPT

## 2022-02-15 ENCOUNTER — OFFICE VISIT (OUTPATIENT)
Dept: INTERNAL MEDICINE | Facility: CLINIC | Age: 37
End: 2022-02-15
Payer: COMMERCIAL

## 2022-02-15 ENCOUNTER — LAB VISIT (OUTPATIENT)
Dept: LAB | Facility: OTHER | Age: 37
End: 2022-02-15
Attending: STUDENT IN AN ORGANIZED HEALTH CARE EDUCATION/TRAINING PROGRAM
Payer: COMMERCIAL

## 2022-02-15 VITALS — HEART RATE: 63 BPM | OXYGEN SATURATION: 100 % | DIASTOLIC BLOOD PRESSURE: 62 MMHG | SYSTOLIC BLOOD PRESSURE: 102 MMHG

## 2022-02-15 DIAGNOSIS — G43.809 OTHER MIGRAINE WITHOUT STATUS MIGRAINOSUS, NOT INTRACTABLE: ICD-10-CM

## 2022-02-15 DIAGNOSIS — Z13.1 SCREENING FOR DIABETES MELLITUS: ICD-10-CM

## 2022-02-15 DIAGNOSIS — Z13.6 SCREENING FOR CARDIOVASCULAR CONDITION: ICD-10-CM

## 2022-02-15 DIAGNOSIS — Z11.59 ENCOUNTER FOR HEPATITIS C SCREENING TEST FOR LOW RISK PATIENT: ICD-10-CM

## 2022-02-15 DIAGNOSIS — Z00.00 HEALTH MAINTENANCE EXAMINATION: ICD-10-CM

## 2022-02-15 DIAGNOSIS — G43.809 OTHER MIGRAINE WITHOUT STATUS MIGRAINOSUS, NOT INTRACTABLE: Primary | ICD-10-CM

## 2022-02-15 LAB
ALBUMIN SERPL BCP-MCNC: 3.9 G/DL (ref 3.5–5.2)
ALP SERPL-CCNC: 42 U/L (ref 55–135)
ALT SERPL W/O P-5'-P-CCNC: 9 U/L (ref 10–44)
ANION GAP SERPL CALC-SCNC: 8 MMOL/L (ref 8–16)
AST SERPL-CCNC: 13 U/L (ref 10–40)
BASOPHILS # BLD AUTO: 0.03 K/UL (ref 0–0.2)
BASOPHILS NFR BLD: 0.5 % (ref 0–1.9)
BILIRUB SERPL-MCNC: 0.6 MG/DL (ref 0.1–1)
BUN SERPL-MCNC: 11 MG/DL (ref 6–20)
CALCIUM SERPL-MCNC: 9.9 MG/DL (ref 8.7–10.5)
CHLORIDE SERPL-SCNC: 105 MMOL/L (ref 95–110)
CHOLEST SERPL-MCNC: 188 MG/DL (ref 120–199)
CHOLEST/HDLC SERPL: 2.9 {RATIO} (ref 2–5)
CO2 SERPL-SCNC: 25 MMOL/L (ref 23–29)
CREAT SERPL-MCNC: 0.7 MG/DL (ref 0.5–1.4)
DIFFERENTIAL METHOD: NORMAL
EOSINOPHIL # BLD AUTO: 0.1 K/UL (ref 0–0.5)
EOSINOPHIL NFR BLD: 1 % (ref 0–8)
ERYTHROCYTE [DISTWIDTH] IN BLOOD BY AUTOMATED COUNT: 12.6 % (ref 11.5–14.5)
EST. GFR  (AFRICAN AMERICAN): >60 ML/MIN/1.73 M^2
EST. GFR  (NON AFRICAN AMERICAN): >60 ML/MIN/1.73 M^2
ESTIMATED AVG GLUCOSE: 100 MG/DL (ref 68–131)
GLUCOSE SERPL-MCNC: 74 MG/DL (ref 70–110)
HBA1C MFR BLD: 5.1 % (ref 4–5.6)
HCT VFR BLD AUTO: 39.8 % (ref 37–48.5)
HDLC SERPL-MCNC: 64 MG/DL (ref 40–75)
HDLC SERPL: 34 % (ref 20–50)
HGB BLD-MCNC: 13 G/DL (ref 12–16)
IMM GRANULOCYTES # BLD AUTO: 0.02 K/UL (ref 0–0.04)
IMM GRANULOCYTES NFR BLD AUTO: 0.3 % (ref 0–0.5)
LDLC SERPL CALC-MCNC: 87.2 MG/DL (ref 63–159)
LYMPHOCYTES # BLD AUTO: 1.5 K/UL (ref 1–4.8)
LYMPHOCYTES NFR BLD: 24.5 % (ref 18–48)
MCH RBC QN AUTO: 28.5 PG (ref 27–31)
MCHC RBC AUTO-ENTMCNC: 32.7 G/DL (ref 32–36)
MCV RBC AUTO: 87 FL (ref 82–98)
MONOCYTES # BLD AUTO: 0.5 K/UL (ref 0.3–1)
MONOCYTES NFR BLD: 8.1 % (ref 4–15)
NEUTROPHILS # BLD AUTO: 4 K/UL (ref 1.8–7.7)
NEUTROPHILS NFR BLD: 65.6 % (ref 38–73)
NONHDLC SERPL-MCNC: 124 MG/DL
NRBC BLD-RTO: 0 /100 WBC
PLATELET # BLD AUTO: 247 K/UL (ref 150–450)
PMV BLD AUTO: 10.9 FL (ref 9.2–12.9)
POTASSIUM SERPL-SCNC: 4 MMOL/L (ref 3.5–5.1)
PROT SERPL-MCNC: 7.1 G/DL (ref 6–8.4)
RBC # BLD AUTO: 4.56 M/UL (ref 4–5.4)
SODIUM SERPL-SCNC: 138 MMOL/L (ref 136–145)
TRIGL SERPL-MCNC: 184 MG/DL (ref 30–150)
TSH SERPL DL<=0.005 MIU/L-ACNC: 0.9 UIU/ML (ref 0.4–4)
WBC # BLD AUTO: 6.05 K/UL (ref 3.9–12.7)

## 2022-02-15 PROCEDURE — 1159F MED LIST DOCD IN RCRD: CPT | Mod: CPTII,S$GLB,, | Performed by: STUDENT IN AN ORGANIZED HEALTH CARE EDUCATION/TRAINING PROGRAM

## 2022-02-15 PROCEDURE — 83036 HEMOGLOBIN GLYCOSYLATED A1C: CPT | Performed by: STUDENT IN AN ORGANIZED HEALTH CARE EDUCATION/TRAINING PROGRAM

## 2022-02-15 PROCEDURE — 3044F HG A1C LEVEL LT 7.0%: CPT | Mod: CPTII,S$GLB,, | Performed by: STUDENT IN AN ORGANIZED HEALTH CARE EDUCATION/TRAINING PROGRAM

## 2022-02-15 PROCEDURE — 80053 COMPREHEN METABOLIC PANEL: CPT | Performed by: STUDENT IN AN ORGANIZED HEALTH CARE EDUCATION/TRAINING PROGRAM

## 2022-02-15 PROCEDURE — 1160F PR REVIEW ALL MEDS BY PRESCRIBER/CLIN PHARMACIST DOCUMENTED: ICD-10-PCS | Mod: CPTII,S$GLB,, | Performed by: STUDENT IN AN ORGANIZED HEALTH CARE EDUCATION/TRAINING PROGRAM

## 2022-02-15 PROCEDURE — 99999 PR PBB SHADOW E&M-EST. PATIENT-LVL III: CPT | Mod: PBBFAC,,, | Performed by: STUDENT IN AN ORGANIZED HEALTH CARE EDUCATION/TRAINING PROGRAM

## 2022-02-15 PROCEDURE — 99385 PREV VISIT NEW AGE 18-39: CPT | Mod: S$GLB,,, | Performed by: STUDENT IN AN ORGANIZED HEALTH CARE EDUCATION/TRAINING PROGRAM

## 2022-02-15 PROCEDURE — 80061 LIPID PANEL: CPT | Performed by: STUDENT IN AN ORGANIZED HEALTH CARE EDUCATION/TRAINING PROGRAM

## 2022-02-15 PROCEDURE — 99999 PR PBB SHADOW E&M-EST. PATIENT-LVL III: ICD-10-PCS | Mod: PBBFAC,,, | Performed by: STUDENT IN AN ORGANIZED HEALTH CARE EDUCATION/TRAINING PROGRAM

## 2022-02-15 PROCEDURE — 85025 COMPLETE CBC W/AUTO DIFF WBC: CPT | Performed by: STUDENT IN AN ORGANIZED HEALTH CARE EDUCATION/TRAINING PROGRAM

## 2022-02-15 PROCEDURE — 36415 COLL VENOUS BLD VENIPUNCTURE: CPT | Performed by: STUDENT IN AN ORGANIZED HEALTH CARE EDUCATION/TRAINING PROGRAM

## 2022-02-15 PROCEDURE — 1159F PR MEDICATION LIST DOCUMENTED IN MEDICAL RECORD: ICD-10-PCS | Mod: CPTII,S$GLB,, | Performed by: STUDENT IN AN ORGANIZED HEALTH CARE EDUCATION/TRAINING PROGRAM

## 2022-02-15 PROCEDURE — 3074F SYST BP LT 130 MM HG: CPT | Mod: CPTII,S$GLB,, | Performed by: STUDENT IN AN ORGANIZED HEALTH CARE EDUCATION/TRAINING PROGRAM

## 2022-02-15 PROCEDURE — 3044F PR MOST RECENT HEMOGLOBIN A1C LEVEL <7.0%: ICD-10-PCS | Mod: CPTII,S$GLB,, | Performed by: STUDENT IN AN ORGANIZED HEALTH CARE EDUCATION/TRAINING PROGRAM

## 2022-02-15 PROCEDURE — 3074F PR MOST RECENT SYSTOLIC BLOOD PRESSURE < 130 MM HG: ICD-10-PCS | Mod: CPTII,S$GLB,, | Performed by: STUDENT IN AN ORGANIZED HEALTH CARE EDUCATION/TRAINING PROGRAM

## 2022-02-15 PROCEDURE — 84443 ASSAY THYROID STIM HORMONE: CPT | Performed by: STUDENT IN AN ORGANIZED HEALTH CARE EDUCATION/TRAINING PROGRAM

## 2022-02-15 PROCEDURE — 1160F RVW MEDS BY RX/DR IN RCRD: CPT | Mod: CPTII,S$GLB,, | Performed by: STUDENT IN AN ORGANIZED HEALTH CARE EDUCATION/TRAINING PROGRAM

## 2022-02-15 PROCEDURE — 3078F PR MOST RECENT DIASTOLIC BLOOD PRESSURE < 80 MM HG: ICD-10-PCS | Mod: CPTII,S$GLB,, | Performed by: STUDENT IN AN ORGANIZED HEALTH CARE EDUCATION/TRAINING PROGRAM

## 2022-02-15 PROCEDURE — 3078F DIAST BP <80 MM HG: CPT | Mod: CPTII,S$GLB,, | Performed by: STUDENT IN AN ORGANIZED HEALTH CARE EDUCATION/TRAINING PROGRAM

## 2022-02-15 PROCEDURE — 86803 HEPATITIS C AB TEST: CPT | Performed by: STUDENT IN AN ORGANIZED HEALTH CARE EDUCATION/TRAINING PROGRAM

## 2022-02-15 PROCEDURE — 99385 PR PREVENTIVE VISIT,NEW,18-39: ICD-10-PCS | Mod: S$GLB,,, | Performed by: STUDENT IN AN ORGANIZED HEALTH CARE EDUCATION/TRAINING PROGRAM

## 2022-02-15 NOTE — PROGRESS NOTES
Subjective:       Patient ID: Olga Prater is a 36 y.o. female.    Chief Complaint: Other migraine without status migrainosus, not intractable [G43.809]    Patient is new to me, here to establish care.    Health maintenance -   Denies family history of colorectal cancer.   Denies family history of ovarian cancers.  Last pap performed .   Denies history of prior abnormal pap smears.  Family history of osteoporosis.  Denies significant family history of cardiac disease.  UTD on COVID19 primary, Tdap, influenza vaccinations.  Due for COVID19 booster vaccinations  Never smoker.  Drinks alcohol 1-2 times per week, 1-4 drinks per sitting.   Denies drug use.  UTD on HIV screening.  Due for Hep C screening.  Due for lipid screening.  Due for diabetes screening.  Goes twice weekly to [employer] class.   Trying to eat more at home, endorses could improve diet.    Taking RAFAEL's without menstruation  Began menarche at age 13.   2 total pregnancies. All born full term, both  due to fibroid and myomectomy.   Hypertension in pregnancy.  Currently sexually active with one male partner.   Uses RAFAEL's for contraception.     Taking sumatriptan PRN headache   Endorses relief with sumatriptan    Review of Systems   Constitutional: Negative for appetite change, chills, fatigue, fever and unexpected weight change.   Respiratory: Negative for cough and shortness of breath.    Cardiovascular: Negative for chest pain, palpitations and leg swelling.   Gastrointestinal: Negative for abdominal pain, constipation, diarrhea, nausea and vomiting.   Skin: Negative for rash.   Neurological: Negative for dizziness, syncope, weakness and headaches.         Current Outpatient Medications   Medication Instructions    drospirenone-e.estradioL-lm.FA (BEYAZ/KIESHA) 3-0.02-0.451 mg (24) (4) Tab 1 tablet, Oral, Daily    sumatriptan (IMITREX) 50 mg, Oral, Once as needed, Can repeat dose in one hour as needed.  Max 200mg/24hr.      Objective:      Vitals:    02/15/22 0753   BP: 102/62   Pulse: 63   SpO2: 100%   PainSc: 0-No pain     There is no height or weight on file to calculate BMI.    Physical Exam  Vitals reviewed.   Constitutional:       General: She is not in acute distress.     Appearance: Normal appearance. She is not ill-appearing or diaphoretic.   HENT:      Head: Normocephalic and atraumatic.      Right Ear: Tympanic membrane, ear canal and external ear normal. There is no impacted cerumen.      Left Ear: Tympanic membrane, ear canal and external ear normal. There is no impacted cerumen.      Nose: Nose normal. No rhinorrhea.      Mouth/Throat:      Mouth: Mucous membranes are moist.      Pharynx: Oropharynx is clear. No oropharyngeal exudate or posterior oropharyngeal erythema.   Eyes:      General: No scleral icterus.        Right eye: No discharge.         Left eye: No discharge.      Conjunctiva/sclera: Conjunctivae normal.   Neck:      Thyroid: No thyromegaly or thyroid tenderness.      Trachea: Trachea normal.   Cardiovascular:      Rate and Rhythm: Normal rate and regular rhythm.      Heart sounds: Normal heart sounds. No murmur heard.    No friction rub. No gallop.   Pulmonary:      Effort: Pulmonary effort is normal. No respiratory distress.      Breath sounds: Normal breath sounds. No stridor. No wheezing, rhonchi or rales.   Chest:   Breasts:      Right: No supraclavicular adenopathy.      Left: No supraclavicular adenopathy.       Abdominal:      General: Bowel sounds are normal. There is no distension.      Palpations: Abdomen is soft.      Tenderness: There is no abdominal tenderness. There is no guarding or rebound.   Musculoskeletal:         General: No swelling or deformity.      Cervical back: Neck supple.   Lymphadenopathy:      Head:      Right side of head: No submandibular or posterior auricular adenopathy.      Left side of head: No submandibular or posterior auricular adenopathy.      Cervical: No  cervical adenopathy.      Right cervical: No superficial, deep or posterior cervical adenopathy.     Left cervical: No superficial, deep or posterior cervical adenopathy.      Upper Body:      Right upper body: No supraclavicular adenopathy.      Left upper body: No supraclavicular adenopathy.   Skin:     General: Skin is warm and dry.   Neurological:      General: No focal deficit present.      Mental Status: She is alert. Mental status is at baseline.      Gait: Gait normal.   Psychiatric:         Mood and Affect: Mood normal.         Behavior: Behavior normal.         Assessment:       1. Other migraine without status migrainosus, not intractable    2. Health maintenance examination    3. Screening for diabetes mellitus    4. Screening for cardiovascular condition    5. Encounter for hepatitis C screening test for low risk patient        Plan:       Other migraine without status migrainosus, not intractable  Continue sumatriptan PRN  -     CBC Auto Differential; Future  -     Comprehensive Metabolic Panel; Future  -     TSH; Future    Health maintenance examination  RTC in 1 year for annual appointment, sooner PRN.    Screening for diabetes mellitus  -     Hemoglobin A1C; Future    Screening for cardiovascular condition  -     Lipid Panel; Future    Encounter for hepatitis C screening test for low risk patient  -     Hepatitis C Antibody; Future        Rita Sotelo MD  2/15/2022

## 2022-02-16 LAB — HCV AB SERPL QL IA: NEGATIVE

## 2022-04-13 ENCOUNTER — PATIENT MESSAGE (OUTPATIENT)
Dept: INTERNAL MEDICINE | Facility: CLINIC | Age: 37
End: 2022-04-13
Payer: COMMERCIAL

## 2022-04-13 RX ORDER — VALACYCLOVIR HYDROCHLORIDE 1 G/1
2000 TABLET, FILM COATED ORAL 2 TIMES DAILY
Qty: 4 TABLET | Refills: 0 | Status: SHIPPED | OUTPATIENT
Start: 2022-04-13 | End: 2022-04-14

## 2022-04-13 NOTE — TELEPHONE ENCOUNTER
No new care gaps identified.  Powered by [email or web address] by [email or web address]. Reference number: [id number].   4/13/2022 9:21:28 AM CDT

## 2022-05-03 ENCOUNTER — PATIENT MESSAGE (OUTPATIENT)
Dept: RESEARCH | Facility: HOSPITAL | Age: 37
End: 2022-05-03
Payer: COMMERCIAL

## 2022-05-16 ENCOUNTER — PATIENT MESSAGE (OUTPATIENT)
Dept: INTERNAL MEDICINE | Facility: CLINIC | Age: 37
End: 2022-05-16
Payer: COMMERCIAL

## 2022-05-16 DIAGNOSIS — N63.0 BREAST LUMP: Primary | ICD-10-CM

## 2022-06-13 ENCOUNTER — OFFICE VISIT (OUTPATIENT)
Dept: URGENT CARE | Facility: CLINIC | Age: 37
End: 2022-06-13
Payer: COMMERCIAL

## 2022-06-13 VITALS
TEMPERATURE: 98 F | RESPIRATION RATE: 17 BRPM | HEIGHT: 61 IN | SYSTOLIC BLOOD PRESSURE: 128 MMHG | HEART RATE: 80 BPM | DIASTOLIC BLOOD PRESSURE: 85 MMHG | OXYGEN SATURATION: 98 % | BODY MASS INDEX: 21.71 KG/M2 | WEIGHT: 115 LBS

## 2022-06-13 DIAGNOSIS — J02.9 PHARYNGITIS, UNSPECIFIED ETIOLOGY: Primary | ICD-10-CM

## 2022-06-13 LAB
CTP QC/QA: YES
MOLECULAR STREP A: NEGATIVE

## 2022-06-13 PROCEDURE — 3008F BODY MASS INDEX DOCD: CPT | Mod: CPTII,S$GLB,, | Performed by: FAMILY MEDICINE

## 2022-06-13 PROCEDURE — 3074F SYST BP LT 130 MM HG: CPT | Mod: CPTII,S$GLB,, | Performed by: FAMILY MEDICINE

## 2022-06-13 PROCEDURE — 3079F PR MOST RECENT DIASTOLIC BLOOD PRESSURE 80-89 MM HG: ICD-10-PCS | Mod: CPTII,S$GLB,, | Performed by: FAMILY MEDICINE

## 2022-06-13 PROCEDURE — 1160F RVW MEDS BY RX/DR IN RCRD: CPT | Mod: CPTII,S$GLB,, | Performed by: FAMILY MEDICINE

## 2022-06-13 PROCEDURE — 87651 STREP A DNA AMP PROBE: CPT | Mod: QW,S$GLB,, | Performed by: FAMILY MEDICINE

## 2022-06-13 PROCEDURE — 1159F PR MEDICATION LIST DOCUMENTED IN MEDICAL RECORD: ICD-10-PCS | Mod: CPTII,S$GLB,, | Performed by: FAMILY MEDICINE

## 2022-06-13 PROCEDURE — 99213 OFFICE O/P EST LOW 20 MIN: CPT | Mod: S$GLB,,, | Performed by: FAMILY MEDICINE

## 2022-06-13 PROCEDURE — 99213 PR OFFICE/OUTPT VISIT, EST, LEVL III, 20-29 MIN: ICD-10-PCS | Mod: S$GLB,,, | Performed by: FAMILY MEDICINE

## 2022-06-13 PROCEDURE — 3044F HG A1C LEVEL LT 7.0%: CPT | Mod: CPTII,S$GLB,, | Performed by: FAMILY MEDICINE

## 2022-06-13 PROCEDURE — 3079F DIAST BP 80-89 MM HG: CPT | Mod: CPTII,S$GLB,, | Performed by: FAMILY MEDICINE

## 2022-06-13 PROCEDURE — 3074F PR MOST RECENT SYSTOLIC BLOOD PRESSURE < 130 MM HG: ICD-10-PCS | Mod: CPTII,S$GLB,, | Performed by: FAMILY MEDICINE

## 2022-06-13 PROCEDURE — 1159F MED LIST DOCD IN RCRD: CPT | Mod: CPTII,S$GLB,, | Performed by: FAMILY MEDICINE

## 2022-06-13 PROCEDURE — 3008F PR BODY MASS INDEX (BMI) DOCUMENTED: ICD-10-PCS | Mod: CPTII,S$GLB,, | Performed by: FAMILY MEDICINE

## 2022-06-13 PROCEDURE — 3044F PR MOST RECENT HEMOGLOBIN A1C LEVEL <7.0%: ICD-10-PCS | Mod: CPTII,S$GLB,, | Performed by: FAMILY MEDICINE

## 2022-06-13 PROCEDURE — 87651 POCT STREP A MOLECULAR: ICD-10-PCS | Mod: QW,S$GLB,, | Performed by: FAMILY MEDICINE

## 2022-06-13 PROCEDURE — 1160F PR REVIEW ALL MEDS BY PRESCRIBER/CLIN PHARMACIST DOCUMENTED: ICD-10-PCS | Mod: CPTII,S$GLB,, | Performed by: FAMILY MEDICINE

## 2022-06-13 NOTE — PATIENT INSTRUCTIONS
PLEASE READ YOUR DISCHARGE INSTRUCTIONS ENTIRELY AS IT CONTAINS IMPORTANT INFORMATION.    Sore throat recommendations: Warm fluids, warm salt water gargles, throat lozenges, tea, honey, soup, rest, hydration.    Change out your toothbrush    Tylenol and ibuprofen    Swish and spit out the magic mouthwash, use just as needed for sore throat.    Please return or see your primary care doctor if you develop new or worsening symptoms.     Please arrange follow up with your primary medical clinic as soon as possible. You must understand that you've received an Urgent Care treatment only and that you may be released before all of your medical problems are known or treated. You, the patient, will arrange for follow up as instructed. If your symptoms worsen or fail to improve you should go to the Emergency Room.

## 2022-06-13 NOTE — PROGRESS NOTES
"Subjective:       Patient ID: Olga Prater is a 36 y.o. female.    Vitals:  height is 5' 1" (1.549 m) and weight is 52.2 kg (115 lb). Her temperature is 98.2 °F (36.8 °C). Her blood pressure is 128/85 and her pulse is 80. Her respiration is 17 and oxygen saturation is 98%.     Chief Complaint: Sore Throat    This is a 36 y.o. female who presents today with a chief complaint of   Sore throat x 3 days. Pt states that back of her throat appears white. Pts son had an ear infection 1 week ago. Pt works in a clinical setting. Tried gargling salt water, which provided temporary relief. At home covid was negative. No fever, cough. No rash,  Had abd pain yesterday resolved. No hx of mono.    Sore Throat   This is a new problem. The current episode started in the past 7 days. The problem has been gradually worsening. There has been no fever. The pain is at a severity of 4/10. The pain is mild. Associated symptoms include headaches and swollen glands. Pertinent negatives include no congestion, coughing, diarrhea, ear pain, shortness of breath, trouble swallowing or vomiting.       Constitution: Negative for fever.   HENT: Positive for sore throat. Negative for ear pain, congestion and trouble swallowing.    Respiratory: Negative for cough and shortness of breath.    Gastrointestinal: Negative for vomiting and diarrhea.   Neurological: Positive for headaches.       Objective:      Physical Exam   Constitutional: She is oriented to person, place, and time. She appears well-developed. She is cooperative.  Non-toxic appearance. She does not appear ill. No distress.   HENT:   Head: Normocephalic and atraumatic.   Ears:   Right Ear: Hearing, tympanic membrane, external ear and ear canal normal. Tympanic membrane is not erythematous. No middle ear effusion.   Left Ear: Hearing, tympanic membrane, external ear and ear canal normal. Tympanic membrane is not erythematous.  No middle ear effusion.   Nose: Nose normal. No mucosal " edema, rhinorrhea or nasal deformity. No epistaxis. Right sinus exhibits no maxillary sinus tenderness and no frontal sinus tenderness. Left sinus exhibits no maxillary sinus tenderness and no frontal sinus tenderness.   Mouth/Throat: Uvula is midline and mucous membranes are normal. No trismus in the jaw. Normal dentition. No uvula swelling. Oropharyngeal exudate and posterior oropharyngeal erythema present. No posterior oropharyngeal edema, tonsillar abscesses or cobblestoning. Tonsils are 1+ on the right. Tonsils are 1+ on the left.   Eyes: Conjunctivae and lids are normal. No scleral icterus.   Neck: Trachea normal and phonation normal. Neck supple. No edema present. No erythema present. No neck rigidity present.   Cardiovascular: Normal rate, regular rhythm, normal heart sounds and normal pulses.   Pulmonary/Chest: Effort normal and breath sounds normal. No accessory muscle usage. No respiratory distress. She has no decreased breath sounds. She has no wheezes. She has no rhonchi. She has no rales.   NAD able to speak in clear complete sentences without difficulty           Comments: NAD able to speak in clear complete sentences without difficulty      Abdominal: Normal appearance and bowel sounds are normal. There is no splenomegaly. There is no abdominal tenderness. There is no rebound and no guarding.   Musculoskeletal: Normal range of motion.         General: No deformity. Normal range of motion.   Lymphadenopathy:     She has cervical adenopathy.   Neurological: She is alert and oriented to person, place, and time. She exhibits normal muscle tone. Coordination normal.   Skin: Skin is warm, dry, intact, not diaphoretic and not pale.   Psychiatric: Her speech is normal and behavior is normal. Judgment and thought content normal.   Nursing note and vitals reviewed.        Results for orders placed or performed in visit on 06/13/22   POCT Strep A, Molecular   Result Value Ref Range    Molecular Strep A, POC  Negative Negative     Acceptable Yes        Assessment:       1. Pharyngitis, unspecified etiology          Plan:         Pharyngitis, unspecified etiology  -     POCT Strep A, Molecular  -     (Magic mouthwash) 1:1:1 diphenhydramine(Benadryl) 12.5mg/5ml liq, aluminum & magnesium hydroxide-simethicone (Maalox), LIDOcaine viscous 2%; Swish and spit 10 mLs every 4 (four) hours as needed (sore throat). for sore throat  Dispense: 360 mL; Refill: 0      Patient Instructions   PLEASE READ YOUR DISCHARGE INSTRUCTIONS ENTIRELY AS IT CONTAINS IMPORTANT INFORMATION.    Sore throat recommendations: Warm fluids, warm salt water gargles, throat lozenges, tea, honey, soup, rest, hydration.    Change out your toothbrush    Tylenol and ibuprofen    Swish and spit out the magic mouthwash, use just as needed for sore throat.    Please return or see your primary care doctor if you develop new or worsening symptoms.     Please arrange follow up with your primary medical clinic as soon as possible. You must understand that you've received an Urgent Care treatment only and that you may be released before all of your medical problems are known or treated. You, the patient, will arrange for follow up as instructed. If your symptoms worsen or fail to improve you should go to the Emergency Room.

## 2022-06-29 ENCOUNTER — OFFICE VISIT (OUTPATIENT)
Dept: SLEEP MEDICINE | Facility: CLINIC | Age: 37
End: 2022-06-29
Payer: COMMERCIAL

## 2022-06-29 ENCOUNTER — PATIENT MESSAGE (OUTPATIENT)
Dept: NEUROLOGY | Facility: CLINIC | Age: 37
End: 2022-06-29
Payer: COMMERCIAL

## 2022-06-29 DIAGNOSIS — G43.009 MIGRAINE WITHOUT AURA AND WITHOUT STATUS MIGRAINOSUS, NOT INTRACTABLE: Primary | ICD-10-CM

## 2022-06-29 PROCEDURE — 3044F HG A1C LEVEL LT 7.0%: CPT | Mod: CPTII,95,, | Performed by: NURSE PRACTITIONER

## 2022-06-29 PROCEDURE — 99203 PR OFFICE/OUTPT VISIT, NEW, LEVL III, 30-44 MIN: ICD-10-PCS | Mod: CR,95,, | Performed by: NURSE PRACTITIONER

## 2022-06-29 PROCEDURE — 3044F PR MOST RECENT HEMOGLOBIN A1C LEVEL <7.0%: ICD-10-PCS | Mod: CPTII,95,, | Performed by: NURSE PRACTITIONER

## 2022-06-29 PROCEDURE — 99203 OFFICE O/P NEW LOW 30 MIN: CPT | Mod: CR,95,, | Performed by: NURSE PRACTITIONER

## 2022-06-29 RX ORDER — SUMATRIPTAN SUCCINATE 11 MG/1
22 CAPSULE NASAL
Qty: 16 EACH | Refills: 5 | Status: SHIPPED | OUTPATIENT
Start: 2022-06-29 | End: 2023-01-25 | Stop reason: ALTCHOICE

## 2022-06-29 RX ORDER — RIMEGEPANT SULFATE 75 MG/75MG
75 TABLET, ORALLY DISINTEGRATING ORAL EVERY OTHER DAY
Qty: 16 TABLET | Refills: 11 | Status: SHIPPED | OUTPATIENT
Start: 2022-06-29 | End: 2023-01-25 | Stop reason: ALTCHOICE

## 2022-06-29 NOTE — PROGRESS NOTES
The patient location is:[place]  The chief complaint leading to consultation is: headache    Visit type: TELE AUDIOVISUAL:75041  Face to Face time with patient: 25minutes of total time spent on the encounter, which includes face to face time and non-face to face time preparing to see the patient (eg, review of tests), Obtaining and/or reviewing separately obtained history, Documenting clinical information in the electronic or other health record, Independently interpreting results (not separately reported) and communicating results to the patient/family/caregiver, or Care coordination (not separately reported). Each patient to whom he or she provides medical services by telemedicine is:  (1) informed of the relationship between the physician and patient and the respective role of any other health care provider with respect to management of the patient; and (2) notified that he or she may decline to receive medical services by telemedicine and may withdraw from such care at any time.      REFERRING PROVIDER: Self referred  36/F with chronic headaches since college, they were intermittent relieved with otc meds/rest. They were off and on. Headache describes as R>L temporal sharp/dull /constant throbbing pain with eye watering, mild pinkness of eye and mild nasal drainage, and nausea (no emesis) and photo/phonophobia. Gradual onset of pain but sometimes abrupt onset severe pain. Had 4-5 consecutive days last week and again headache this morning no trigger. She is tired of having them. Never had head imaging. Menses is known triggers (on OCP w/o placebo helped but now they are returning). Didn't have migraines while pregnant. She denies acute neurological deficit . 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 Treat early  Onzetra NS 22mg q2h prn ([place])  MRI brain w/o     RTC 3 month, sooner if needed. Instructed how to track frequency of headache days/mos for accurate monitoring

## 2022-07-14 ENCOUNTER — HOSPITAL ENCOUNTER (OUTPATIENT)
Dept: RADIOLOGY | Facility: OTHER | Age: 37
Discharge: HOME | End: 2022-07-14
Attending: STUDENT IN AN ORGANIZED HEALTH CARE EDUCATION/TRAINING PROGRAM
Payer: COMMERCIAL

## 2022-07-14 DIAGNOSIS — N63.0 BREAST LUMP: ICD-10-CM

## 2022-07-14 PROCEDURE — 77062 BREAST TOMOSYNTHESIS BI: CPT | Mod: 26,,, | Performed by: RADIOLOGY

## 2022-07-14 PROCEDURE — 77066 DX MAMMO INCL CAD BI: CPT | Mod: 26,,, | Performed by: RADIOLOGY

## 2022-07-14 PROCEDURE — 77062 MAMMO DIGITAL DIAGNOSTIC BILAT WITH TOMO: ICD-10-PCS | Mod: 26,,, | Performed by: RADIOLOGY

## 2022-07-14 PROCEDURE — 76642 ULTRASOUND BREAST LIMITED: CPT | Mod: 26,50,, | Performed by: RADIOLOGY

## 2022-07-14 PROCEDURE — 77066 DX MAMMO INCL CAD BI: CPT | Mod: TC

## 2022-07-14 PROCEDURE — 76642 US BREAST BILATERAL LIMITED: ICD-10-PCS | Mod: 26,50,, | Performed by: RADIOLOGY

## 2022-07-14 PROCEDURE — 77066 MAMMO DIGITAL DIAGNOSTIC BILAT WITH TOMO: ICD-10-PCS | Mod: 26,,, | Performed by: RADIOLOGY

## 2022-07-14 PROCEDURE — 76642 ULTRASOUND BREAST LIMITED: CPT | Mod: TC,50

## 2022-10-11 DIAGNOSIS — G43.909 MIGRAINE WITHOUT STATUS MIGRAINOSUS, NOT INTRACTABLE, UNSPECIFIED MIGRAINE TYPE: ICD-10-CM

## 2022-10-13 RX ORDER — SUMATRIPTAN 50 MG/1
50 TABLET, FILM COATED ORAL ONCE AS NEEDED
Qty: 30 TABLET | Refills: 0 | Status: SHIPPED | OUTPATIENT
Start: 2022-10-13 | End: 2022-10-18

## 2022-11-03 ENCOUNTER — OFFICE VISIT (OUTPATIENT)
Dept: OBSTETRICS AND GYNECOLOGY | Facility: CLINIC | Age: 37
End: 2022-11-03
Payer: COMMERCIAL

## 2022-11-03 VITALS
BODY MASS INDEX: 21.79 KG/M2 | SYSTOLIC BLOOD PRESSURE: 127 MMHG | DIASTOLIC BLOOD PRESSURE: 79 MMHG | WEIGHT: 115.31 LBS

## 2022-11-03 DIAGNOSIS — Z01.419 ENCOUNTER FOR WELL WOMAN EXAM WITH ROUTINE GYNECOLOGICAL EXAM: Primary | ICD-10-CM

## 2022-11-03 DIAGNOSIS — G43.809 OTHER MIGRAINE WITHOUT STATUS MIGRAINOSUS, NOT INTRACTABLE: ICD-10-CM

## 2022-11-03 DIAGNOSIS — N63.0 BREAST LUMP: ICD-10-CM

## 2022-11-03 DIAGNOSIS — Z30.9 ENCOUNTER FOR CONTRACEPTIVE MANAGEMENT, UNSPECIFIED TYPE: ICD-10-CM

## 2022-11-03 PROCEDURE — 3074F SYST BP LT 130 MM HG: CPT | Mod: CPTII,S$GLB,, | Performed by: OBSTETRICS & GYNECOLOGY

## 2022-11-03 PROCEDURE — 3008F BODY MASS INDEX DOCD: CPT | Mod: CPTII,S$GLB,, | Performed by: OBSTETRICS & GYNECOLOGY

## 2022-11-03 PROCEDURE — 99999 PR PBB SHADOW E&M-EST. PATIENT-LVL III: ICD-10-PCS | Mod: PBBFAC,,, | Performed by: OBSTETRICS & GYNECOLOGY

## 2022-11-03 PROCEDURE — 3078F DIAST BP <80 MM HG: CPT | Mod: CPTII,S$GLB,, | Performed by: OBSTETRICS & GYNECOLOGY

## 2022-11-03 PROCEDURE — 3044F PR MOST RECENT HEMOGLOBIN A1C LEVEL <7.0%: ICD-10-PCS | Mod: CPTII,S$GLB,, | Performed by: OBSTETRICS & GYNECOLOGY

## 2022-11-03 PROCEDURE — 1159F PR MEDICATION LIST DOCUMENTED IN MEDICAL RECORD: ICD-10-PCS | Mod: CPTII,S$GLB,, | Performed by: OBSTETRICS & GYNECOLOGY

## 2022-11-03 PROCEDURE — 99395 PR PREVENTIVE VISIT,EST,18-39: ICD-10-PCS | Mod: S$GLB,,, | Performed by: OBSTETRICS & GYNECOLOGY

## 2022-11-03 PROCEDURE — 1159F MED LIST DOCD IN RCRD: CPT | Mod: CPTII,S$GLB,, | Performed by: OBSTETRICS & GYNECOLOGY

## 2022-11-03 PROCEDURE — 99395 PREV VISIT EST AGE 18-39: CPT | Mod: S$GLB,,, | Performed by: OBSTETRICS & GYNECOLOGY

## 2022-11-03 PROCEDURE — 99999 PR PBB SHADOW E&M-EST. PATIENT-LVL III: CPT | Mod: PBBFAC,,, | Performed by: OBSTETRICS & GYNECOLOGY

## 2022-11-03 PROCEDURE — 3008F PR BODY MASS INDEX (BMI) DOCUMENTED: ICD-10-PCS | Mod: CPTII,S$GLB,, | Performed by: OBSTETRICS & GYNECOLOGY

## 2022-11-03 PROCEDURE — 3044F HG A1C LEVEL LT 7.0%: CPT | Mod: CPTII,S$GLB,, | Performed by: OBSTETRICS & GYNECOLOGY

## 2022-11-03 PROCEDURE — 3074F PR MOST RECENT SYSTOLIC BLOOD PRESSURE < 130 MM HG: ICD-10-PCS | Mod: CPTII,S$GLB,, | Performed by: OBSTETRICS & GYNECOLOGY

## 2022-11-03 PROCEDURE — 3078F PR MOST RECENT DIASTOLIC BLOOD PRESSURE < 80 MM HG: ICD-10-PCS | Mod: CPTII,S$GLB,, | Performed by: OBSTETRICS & GYNECOLOGY

## 2022-11-03 RX ORDER — DROSPIRENONE, ETHINYL ESTRADIOL AND LEVOMEFOLATE CALCIUM AND LEVOMEFOLATE CALCIUM 3-0.02(24)
1 KIT ORAL DAILY
Qty: 112 TABLET | Refills: 4 | Status: SHIPPED | OUTPATIENT
Start: 2022-11-03 | End: 2023-11-30 | Stop reason: SDUPTHER

## 2022-11-03 RX ORDER — SUMATRIPTAN 50 MG/1
TABLET, FILM COATED ORAL
Qty: 30 TABLET | Refills: 12 | Status: SHIPPED | OUTPATIENT
Start: 2022-11-03 | End: 2023-11-30 | Stop reason: SDUPTHER

## 2022-11-03 NOTE — PROGRESS NOTES
History & Physical  Gynecology      SUBJECTIVE:     Chief Complaint: Well Woman       History of Present Illness:    Olga Prater is a 37 y.o. female  (C/S x2- 2 boys) here for annual routine Pap and checkup. No LMP recorded..  She has no unusual complaints.      Has been taking OCPs continuously because of menstrual migraines.   denies break through bleeding.   denies vaginal itching or irritation.  denies vaginal discharge.    She is sexually active with 1 partner (male partner)  She uses oral contraceptives (estrogen/progesterone) for contraception.    History of abnormal pap: No  Last Pap: (2021)  Last MMG: Yes - has fibroadenomas and has had multiple MMGs and biopsies to confirm  Last Colonoscopy:  No      Review of patient's allergies indicates:  No Known Allergies    Past Medical History:   Diagnosis Date    Fibroadenoma of right breast     Menarche     Age of onset 13    Migraine     Severe preeclampsia 2017     Past Surgical History:   Procedure Laterality Date    BREAST BIOPSY Right     fibroadenoma     SECTION      for NRFHTs, induction for Severe PreEclampsia     SECTION N/A 2019    Procedure:  SECTION;  Surgeon: Mohan Pastrana MD;  Location: [facility] L&D;  Service: OB/GYN;  Laterality: N/A;    MYOMECTOMY  2017    WISDOM TOOTH EXTRACTION       OB History          2    Para   2    Term   2       0    AB   0    Living   2         SAB   0    IAB   0    Ectopic   0    Multiple   0    Live Births   2               Family History   Problem Relation Age of Onset    Diabetes Father         Pre    Hypertension Father     Lung disease Father         pulmonary fibrosis    Melanoma Father     Hyperthyroidism Mother     Hypertension Sister     Breast cancer Maternal Grandmother     Throat cancer Maternal Grandmother     Breast cancer Maternal Aunt     Stroke Maternal Grandfather     Colon cancer Neg Hx     Ovarian cancer Neg Hx     Heart disease Neg  Hx     Heart attack Neg Hx      Social History     Tobacco Use    Smoking status: Never    Smokeless tobacco: Never   Substance Use Topics    Alcohol use: Yes     Comment: social     Drug use: No       Current Outpatient Medications   Medication Sig    SUMAtriptan succinate (ONZETRA XSAIL) 11 mg AePB 22 mg by Nasal route every 2 (two) hours as needed (migraine).    drospirenone-e.estradioL-lm.FA (BEYAZ/KIESHA) 3-0.02-0.451 mg (24) (4) Tab Take 1 tablet by mouth once daily.    rimegepant (NURTEC) 75 mg odt Take 1 tablet (75 mg total) by mouth every other day. Place ODT tablet on the tongue (Patient not taking: Reported on 11/3/2022)    sumatriptan (IMITREX) 50 MG tablet Take 1 tablet by mouth as needed for migraines.  Repeat in 1 hour if needed    valACYclovir (VALTREX) 1000 MG tablet Take 2 tablets (2,000 mg total) by mouth 2 (two) times daily. for 1 day     No current facility-administered medications for this visit.         Review of Systems:  Review of Systems   Constitutional:  Negative for activity change, appetite change, chills, fatigue, fever and unexpected weight change.   Respiratory:  Negative for cough, shortness of breath and wheezing.    Cardiovascular:  Negative for chest pain and leg swelling.   Gastrointestinal:  Negative for abdominal pain, constipation, diarrhea, nausea and vomiting.   Endocrine: Negative for hair loss and hot flashes.   Genitourinary:  Negative for decreased libido, dyspareunia, dysuria, frequency, menstrual problem, pelvic pain, vaginal bleeding, vaginal discharge and vaginal pain.   Integumentary:  Negative for acne, hair changes, nipple discharge and breast skin changes.   Neurological:  Negative for headaches.   Psychiatric/Behavioral:  Negative for sleep disturbance.    Breast: Positive for lump.Negative for mastodynia, nipple discharge and skin changes     OBJECTIVE:     Physical Exam:  Physical Exam  Constitutional:       Appearance: She is well-developed.   HENT:       Head: Normocephalic and atraumatic.   Eyes:      General: No scleral icterus.        Right eye: No discharge.         Left eye: No discharge.      Conjunctiva/sclera: Conjunctivae normal.   Pulmonary:      Effort: Pulmonary effort is normal.      Breath sounds: No stridor.   Chest:      Chest wall: No mass or tenderness.   Breasts:     Breasts are symmetrical.      Right: No inverted nipple, mass, nipple discharge, skin change or tenderness.      Left: No inverted nipple, mass, nipple discharge, skin change or tenderness.       Abdominal:      General: There is no distension.      Palpations: Abdomen is soft.      Tenderness: There is no abdominal tenderness.   Genitourinary:     Labia:         Right: No rash, tenderness, lesion or injury.         Left: No rash, tenderness, lesion or injury.       Vagina: Normal.      Cervix: No cervical motion tenderness, discharge or friability.      Adnexa:         Right: No mass, tenderness or fullness.          Left: No mass, tenderness or fullness.        Comments: Normal external genitalia.  Normal hair distribution.  Urethral meatus normal. No cervical lesions or masses.  No vaginal bleeding noted.  No adnexal or uterine tenderness.  No palpable adnexal masses.  Musculoskeletal:         General: Normal range of motion.   Skin:     General: Skin is warm and dry.   Neurological:      Mental Status: She is alert and oriented to person, place, and time.   Psychiatric:         Behavior: Behavior normal.         Thought Content: Thought content normal.         Judgment: Judgment normal.          ASSESSMENT:       ICD-10-CM ICD-9-CM    1. Encounter for well woman exam with routine gynecological exam  Z01.419 V72.31       2. Breast lump  N63.0 611.72 Ambulatory referral/consult to Obstetrics / Gynecology      3. Encounter for contraceptive management, unspecified type  Z30.9 V25.9 drospirenone-e.estradioL-lm.FA (BEYAZ/KIESHA) 3-0.02-0.451 mg (24) (4) Tab      4. Other migraine  without status migrainosus, not intractable  G43.809 346.80 sumatriptan (IMITREX) 50 MG tablet             Plan:      Olga was seen today for well woman.    Diagnoses and all orders for this visit:    Encounter for well woman exam with routine gynecological exam  - pap smear up to date  - MMG not indicated at this time.  Has had imaging in the past and fibroadenomas have been noted and stable  - Cscope at age 46    Breast lump  - Stable and known fibroadenomas.  - Discussed pros and cons of imaging.  Pt feels they are stable is ok with deferring imaging at this time  -     Ambulatory referral/consult to Obstetrics / Gynecology    Encounter for contraceptive management, unspecified type  -     drospirenone-e.estradioL-lm.FA (BEYAZ/KIESHA) 3-0.02-0.451 mg (24) (4) Tab; Take 1 tablet by mouth once daily.    Other migraine without status migrainosus, not intractable  -     sumatriptan (IMITREX) 50 MG tablet; Take 1 tablet by mouth as needed for migraines.  Repeat in 1 hour if needed        No orders of the defined types were placed in this encounter.      Follow up in about 1 year (around 11/3/2023) for annual.    Counseling time: 15 minutes    Monika Sanchez

## 2023-01-16 ENCOUNTER — PATIENT MESSAGE (OUTPATIENT)
Dept: OBSTETRICS AND GYNECOLOGY | Facility: CLINIC | Age: 38
End: 2023-01-16
Payer: COMMERCIAL

## 2023-01-18 ENCOUNTER — PATIENT MESSAGE (OUTPATIENT)
Dept: INTERNAL MEDICINE | Facility: CLINIC | Age: 38
End: 2023-01-18
Payer: COMMERCIAL

## 2023-01-25 ENCOUNTER — OFFICE VISIT (OUTPATIENT)
Dept: INTERNAL MEDICINE | Facility: CLINIC | Age: 38
End: 2023-01-25
Payer: COMMERCIAL

## 2023-01-25 VITALS
WEIGHT: 114.88 LBS | HEART RATE: 80 BPM | OXYGEN SATURATION: 99 % | BODY MASS INDEX: 21.7 KG/M2 | SYSTOLIC BLOOD PRESSURE: 132 MMHG | DIASTOLIC BLOOD PRESSURE: 80 MMHG

## 2023-01-25 DIAGNOSIS — Z13.6 SCREENING FOR CARDIOVASCULAR CONDITION: ICD-10-CM

## 2023-01-25 DIAGNOSIS — F41.9 ANXIETY: ICD-10-CM

## 2023-01-25 DIAGNOSIS — Z13.1 SCREENING FOR DIABETES MELLITUS: ICD-10-CM

## 2023-01-25 DIAGNOSIS — Z23 NEED FOR VACCINATION: ICD-10-CM

## 2023-01-25 DIAGNOSIS — Z00.00 HEALTH MAINTENANCE EXAMINATION: Primary | ICD-10-CM

## 2023-01-25 DIAGNOSIS — G43.809 OTHER MIGRAINE WITHOUT STATUS MIGRAINOSUS, NOT INTRACTABLE: ICD-10-CM

## 2023-01-25 PROCEDURE — 99999 PR PBB SHADOW E&M-EST. PATIENT-LVL III: CPT | Mod: PBBFAC,,, | Performed by: STUDENT IN AN ORGANIZED HEALTH CARE EDUCATION/TRAINING PROGRAM

## 2023-01-25 PROCEDURE — 3008F BODY MASS INDEX DOCD: CPT | Mod: CPTII,S$GLB,, | Performed by: STUDENT IN AN ORGANIZED HEALTH CARE EDUCATION/TRAINING PROGRAM

## 2023-01-25 PROCEDURE — 1159F MED LIST DOCD IN RCRD: CPT | Mod: CPTII,S$GLB,, | Performed by: STUDENT IN AN ORGANIZED HEALTH CARE EDUCATION/TRAINING PROGRAM

## 2023-01-25 PROCEDURE — 99213 PR OFFICE/OUTPT VISIT, EST, LEVL III, 20-29 MIN: ICD-10-PCS | Mod: 25,S$GLB,, | Performed by: STUDENT IN AN ORGANIZED HEALTH CARE EDUCATION/TRAINING PROGRAM

## 2023-01-25 PROCEDURE — 99213 OFFICE O/P EST LOW 20 MIN: CPT | Mod: 25,S$GLB,, | Performed by: STUDENT IN AN ORGANIZED HEALTH CARE EDUCATION/TRAINING PROGRAM

## 2023-01-25 PROCEDURE — 1159F PR MEDICATION LIST DOCUMENTED IN MEDICAL RECORD: ICD-10-PCS | Mod: CPTII,S$GLB,, | Performed by: STUDENT IN AN ORGANIZED HEALTH CARE EDUCATION/TRAINING PROGRAM

## 2023-01-25 PROCEDURE — 99395 PR PREVENTIVE VISIT,EST,18-39: ICD-10-PCS | Mod: S$GLB,,, | Performed by: STUDENT IN AN ORGANIZED HEALTH CARE EDUCATION/TRAINING PROGRAM

## 2023-01-25 PROCEDURE — 99999 PR PBB SHADOW E&M-EST. PATIENT-LVL III: ICD-10-PCS | Mod: PBBFAC,,, | Performed by: STUDENT IN AN ORGANIZED HEALTH CARE EDUCATION/TRAINING PROGRAM

## 2023-01-25 PROCEDURE — 1160F PR REVIEW ALL MEDS BY PRESCRIBER/CLIN PHARMACIST DOCUMENTED: ICD-10-PCS | Mod: CPTII,S$GLB,, | Performed by: STUDENT IN AN ORGANIZED HEALTH CARE EDUCATION/TRAINING PROGRAM

## 2023-01-25 PROCEDURE — 3075F PR MOST RECENT SYSTOLIC BLOOD PRESS GE 130-139MM HG: ICD-10-PCS | Mod: CPTII,S$GLB,, | Performed by: STUDENT IN AN ORGANIZED HEALTH CARE EDUCATION/TRAINING PROGRAM

## 2023-01-25 PROCEDURE — 1160F RVW MEDS BY RX/DR IN RCRD: CPT | Mod: CPTII,S$GLB,, | Performed by: STUDENT IN AN ORGANIZED HEALTH CARE EDUCATION/TRAINING PROGRAM

## 2023-01-25 PROCEDURE — 3079F DIAST BP 80-89 MM HG: CPT | Mod: CPTII,S$GLB,, | Performed by: STUDENT IN AN ORGANIZED HEALTH CARE EDUCATION/TRAINING PROGRAM

## 2023-01-25 PROCEDURE — 3075F SYST BP GE 130 - 139MM HG: CPT | Mod: CPTII,S$GLB,, | Performed by: STUDENT IN AN ORGANIZED HEALTH CARE EDUCATION/TRAINING PROGRAM

## 2023-01-25 PROCEDURE — 99395 PREV VISIT EST AGE 18-39: CPT | Mod: S$GLB,,, | Performed by: STUDENT IN AN ORGANIZED HEALTH CARE EDUCATION/TRAINING PROGRAM

## 2023-01-25 PROCEDURE — 3079F PR MOST RECENT DIASTOLIC BLOOD PRESSURE 80-89 MM HG: ICD-10-PCS | Mod: CPTII,S$GLB,, | Performed by: STUDENT IN AN ORGANIZED HEALTH CARE EDUCATION/TRAINING PROGRAM

## 2023-01-25 PROCEDURE — 3008F PR BODY MASS INDEX (BMI) DOCUMENTED: ICD-10-PCS | Mod: CPTII,S$GLB,, | Performed by: STUDENT IN AN ORGANIZED HEALTH CARE EDUCATION/TRAINING PROGRAM

## 2023-01-25 RX ORDER — ESCITALOPRAM OXALATE 10 MG/1
TABLET ORAL
Qty: 26 TABLET | Refills: 0 | Status: SHIPPED | OUTPATIENT
Start: 2023-01-25 | End: 2023-02-14 | Stop reason: SDUPTHER

## 2023-01-25 NOTE — PROGRESS NOTES
Subjective:       Patient ID: Olga Prater is a 37 y.o. female.    Chief Complaint: Health maintenance examination [Z00.00]    Patient is established with me, here today for the following:    Health maintenance -   Denies family history of colorectal cancer.   Denies family history of ovarian cancers.  Last pap performed .   Denies history of prior abnormal pap smears.  Family history of osteoporosis.  Denies significant family history of cardiac disease.  UTD on COVID19 primary, Tdap, influenza vaccinations.  Due for COVID19 bivalent vaccinations.  Unsure if had HPV vaccine.  Never smoker.  Drinks alcohol 1-2 times per week, 1-4 drinks per sitting.   Denies drug use.  Completed HIV and Hep C screening.  Due for lipid screening.  Lab Results       Component                Value               Date                       LDLCALC                  87.2                02/15/2022            Due for diabetes screening.  Lab Results       Component                Value               Date                       HGBA1C                   5.1                 02/15/2022              Not menstruating on continuous RAFAEL's   Began menarche at age 13.   [id number]   due to fibroid and myomectomy.   Denies gestational diabetes.  Gestational HTN.  Currently sexually active with .   Uses RAFAEL's for contraception.     Taking sumatriptan PRN headache   Endorses good control with sumatriptan    Endorses has been experiencing worsening anxiety more recently  Interested in starting medication for symptoms  Believes sister may have taken Lexapro with good effect  RALPH 7: 6 points, suggest mild anxiety disorder   PHQ 9: 4 points, suggest minimal depression     Review of Systems   Constitutional:  Negative for appetite change, chills, fatigue, fever and unexpected weight change.   Respiratory:  Negative for cough and shortness of breath.    Cardiovascular:  Negative for chest pain, palpitations and leg  swelling.   Gastrointestinal:  Negative for abdominal pain, constipation, diarrhea, nausea and vomiting.   Skin:  Negative for rash.   Neurological:  Negative for dizziness, syncope, weakness and headaches.   Psychiatric/Behavioral:  The patient is nervous/anxious.        Current Outpatient Medications   Medication Instructions    drospirenone-e.estradioL-lm.FA (BEYAZ/KIESHA) 3-0.02-0.451 mg (24) (4) Tab Take 1 tablet by mouth once daily, skip placebo tablets.    EScitalopram oxalate (LEXAPRO) 10 MG tablet Take 0.5 tablets (5 mg total) by mouth once daily for 8 days, THEN 1 tablet (10 mg total) once daily for 22 days.    sumatriptan (IMITREX) 50 MG tablet Take 1 tablet by mouth as needed for migraines.  Repeat in 1 hour if needed    valACYclovir (VALTREX) 2,000 mg, Oral, 2 times daily     Objective:      Vitals:    01/25/23 1551   BP: 132/80   Pulse: 80   SpO2: 99%   Weight: 52.1 kg (114 lb 13.8 oz)   PainSc: 0-No pain     Body mass index is 21.7 kg/m².    Physical Exam  Vitals reviewed.   Constitutional:       General: She is not in acute distress.     Appearance: Normal appearance. She is not ill-appearing or diaphoretic.   HENT:      Head: Normocephalic and atraumatic.      Right Ear: Tympanic membrane, ear canal and external ear normal. There is no impacted cerumen.      Left Ear: Tympanic membrane, ear canal and external ear normal. There is no impacted cerumen.      Nose: Nose normal. No rhinorrhea.      Mouth/Throat:      Mouth: Mucous membranes are moist.      Pharynx: Oropharynx is clear. No oropharyngeal exudate or posterior oropharyngeal erythema.   Eyes:      General: No scleral icterus.        Right eye: No discharge.         Left eye: No discharge.      Conjunctiva/sclera: Conjunctivae normal.   Neck:      Thyroid: No thyromegaly or thyroid tenderness.      Trachea: Trachea normal.   Cardiovascular:      Rate and Rhythm: Normal rate and regular rhythm.      Heart sounds: Normal heart sounds. No murmur  heard.    No friction rub. No gallop.   Pulmonary:      Effort: Pulmonary effort is normal. No respiratory distress.      Breath sounds: Normal breath sounds. No stridor. No wheezing, rhonchi or rales.   Abdominal:      General: There is no distension.      Palpations: Abdomen is soft.      Tenderness: There is no abdominal tenderness. There is no guarding or rebound.   Musculoskeletal:         General: No swelling or deformity.      Cervical back: Neck supple.   Lymphadenopathy:      Head:      Right side of head: No submandibular or posterior auricular adenopathy.      Left side of head: No submandibular or posterior auricular adenopathy.      Cervical: No cervical adenopathy.      Right cervical: No superficial, deep or posterior cervical adenopathy.     Left cervical: No superficial, deep or posterior cervical adenopathy.      Upper Body:      Right upper body: No supraclavicular adenopathy.      Left upper body: No supraclavicular adenopathy.   Skin:     General: Skin is warm and dry.   Neurological:      General: No focal deficit present.      Mental Status: She is alert. Mental status is at baseline.      Gait: Gait normal.   Psychiatric:         Mood and Affect: Mood normal.         Behavior: Behavior normal.       Assessment:       1. Health maintenance examination    2. Anxiety    3. Other migraine without status migrainosus, not intractable    4. Need for vaccination    5. Screening for diabetes mellitus    6. Screening for cardiovascular condition        Plan:       Anxiety  Start Lexapro daily  Recommend starting counseling  List of local mental health resources provided   RTC in 4 weeks for follow up appointment  -     Ambulatory referral/consult to Psychology; Future  -     EScitalopram oxalate (LEXAPRO) 10 MG tablet; Take 0.5 tablets (5 mg total) by mouth once daily for 8 days, THEN 1 tablet (10 mg total) once daily for 22 days.  -     CBC Auto Differential; Future  -     Comprehensive Metabolic  Panel; Future  -     TSH; Future  -     Vitamin B12; Future    Other migraine without status migrainosus, not intractable  Continue current medications    Health maintenance examination  Need for vaccination  Reviewed and discussed age appropriate screenings and immunizations.  -     EScitalopram oxalate (LEXAPRO) 10 MG tablet; Take 0.5 tablets (5 mg total) by mouth once daily for 8 days, THEN 1 tablet (10 mg total) once daily for 22 days.  -     CBC Auto Differential; Future  -     Comprehensive Metabolic Panel; Future  -     TSH; Future  -     Lipid Panel; Future  -     Hemoglobin A1C; Future  -     Vitamin B12; Future    Screening for diabetes mellitus  -     Hemoglobin A1C; Future    Screening for cardiovascular condition  -     Lipid Panel; Future        Rita Sotelo MD  1/25/2023

## 2023-01-26 ENCOUNTER — LAB VISIT (OUTPATIENT)
Dept: LAB | Facility: OTHER | Age: 38
End: 2023-01-26
Attending: STUDENT IN AN ORGANIZED HEALTH CARE EDUCATION/TRAINING PROGRAM
Payer: COMMERCIAL

## 2023-01-26 ENCOUNTER — PATIENT MESSAGE (OUTPATIENT)
Dept: INTERNAL MEDICINE | Facility: CLINIC | Age: 38
End: 2023-01-26
Payer: COMMERCIAL

## 2023-01-26 DIAGNOSIS — Z13.1 SCREENING FOR DIABETES MELLITUS: ICD-10-CM

## 2023-01-26 DIAGNOSIS — F41.9 ANXIETY: ICD-10-CM

## 2023-01-26 DIAGNOSIS — Z00.00 HEALTH MAINTENANCE EXAMINATION: ICD-10-CM

## 2023-01-26 LAB
ALBUMIN SERPL BCP-MCNC: 4.2 G/DL (ref 3.5–5.2)
ALP SERPL-CCNC: 46 U/L (ref 55–135)
ALT SERPL W/O P-5'-P-CCNC: 13 U/L (ref 10–44)
ANION GAP SERPL CALC-SCNC: 8 MMOL/L (ref 8–16)
AST SERPL-CCNC: 14 U/L (ref 10–40)
BASOPHILS # BLD AUTO: 0.05 K/UL (ref 0–0.2)
BASOPHILS NFR BLD: 0.5 % (ref 0–1.9)
BILIRUB SERPL-MCNC: 0.7 MG/DL (ref 0.1–1)
BUN SERPL-MCNC: 10 MG/DL (ref 6–20)
CALCIUM SERPL-MCNC: 9.9 MG/DL (ref 8.7–10.5)
CHLORIDE SERPL-SCNC: 104 MMOL/L (ref 95–110)
CO2 SERPL-SCNC: 25 MMOL/L (ref 23–29)
CREAT SERPL-MCNC: 0.7 MG/DL (ref 0.5–1.4)
DIFFERENTIAL METHOD: NORMAL
EOSINOPHIL # BLD AUTO: 0.1 K/UL (ref 0–0.5)
EOSINOPHIL NFR BLD: 0.6 % (ref 0–8)
ERYTHROCYTE [DISTWIDTH] IN BLOOD BY AUTOMATED COUNT: 12 % (ref 11.5–14.5)
EST. GFR  (NO RACE VARIABLE): >60 ML/MIN/1.73 M^2
ESTIMATED AVG GLUCOSE: 103 MG/DL (ref 68–131)
GLUCOSE SERPL-MCNC: 100 MG/DL (ref 70–110)
HBA1C MFR BLD: 5.2 % (ref 4–5.6)
HCT VFR BLD AUTO: 42.3 % (ref 37–48.5)
HGB BLD-MCNC: 13.8 G/DL (ref 12–16)
IMM GRANULOCYTES # BLD AUTO: 0.02 K/UL (ref 0–0.04)
IMM GRANULOCYTES NFR BLD AUTO: 0.2 % (ref 0–0.5)
LYMPHOCYTES # BLD AUTO: 2.2 K/UL (ref 1–4.8)
LYMPHOCYTES NFR BLD: 22.1 % (ref 18–48)
MCH RBC QN AUTO: 28 PG (ref 27–31)
MCHC RBC AUTO-ENTMCNC: 32.6 G/DL (ref 32–36)
MCV RBC AUTO: 86 FL (ref 82–98)
MONOCYTES # BLD AUTO: 0.6 K/UL (ref 0.3–1)
MONOCYTES NFR BLD: 6 % (ref 4–15)
NEUTROPHILS # BLD AUTO: 6.9 K/UL (ref 1.8–7.7)
NEUTROPHILS NFR BLD: 70.6 % (ref 38–73)
NRBC BLD-RTO: 0 /100 WBC
PLATELET # BLD AUTO: 280 K/UL (ref 150–450)
PMV BLD AUTO: 10.4 FL (ref 9.2–12.9)
POTASSIUM SERPL-SCNC: 3.9 MMOL/L (ref 3.5–5.1)
PROT SERPL-MCNC: 7.6 G/DL (ref 6–8.4)
RBC # BLD AUTO: 4.93 M/UL (ref 4–5.4)
SODIUM SERPL-SCNC: 137 MMOL/L (ref 136–145)
TSH SERPL DL<=0.005 MIU/L-ACNC: 1.01 UIU/ML (ref 0.4–4)
VIT B12 SERPL-MCNC: 161 PG/ML (ref 210–950)
WBC # BLD AUTO: 9.71 K/UL (ref 3.9–12.7)

## 2023-01-26 PROCEDURE — 83036 HEMOGLOBIN GLYCOSYLATED A1C: CPT | Performed by: STUDENT IN AN ORGANIZED HEALTH CARE EDUCATION/TRAINING PROGRAM

## 2023-01-26 PROCEDURE — 80053 COMPREHEN METABOLIC PANEL: CPT | Performed by: STUDENT IN AN ORGANIZED HEALTH CARE EDUCATION/TRAINING PROGRAM

## 2023-01-26 PROCEDURE — 85025 COMPLETE CBC W/AUTO DIFF WBC: CPT | Performed by: STUDENT IN AN ORGANIZED HEALTH CARE EDUCATION/TRAINING PROGRAM

## 2023-01-26 PROCEDURE — 82607 VITAMIN B-12: CPT | Performed by: STUDENT IN AN ORGANIZED HEALTH CARE EDUCATION/TRAINING PROGRAM

## 2023-01-26 PROCEDURE — 36415 COLL VENOUS BLD VENIPUNCTURE: CPT | Performed by: STUDENT IN AN ORGANIZED HEALTH CARE EDUCATION/TRAINING PROGRAM

## 2023-01-26 PROCEDURE — 84443 ASSAY THYROID STIM HORMONE: CPT | Performed by: STUDENT IN AN ORGANIZED HEALTH CARE EDUCATION/TRAINING PROGRAM

## 2023-01-27 ENCOUNTER — PATIENT MESSAGE (OUTPATIENT)
Dept: RESEARCH | Facility: HOSPITAL | Age: 38
End: 2023-01-27
Payer: COMMERCIAL

## 2023-02-14 ENCOUNTER — PATIENT MESSAGE (OUTPATIENT)
Dept: INTERNAL MEDICINE | Facility: CLINIC | Age: 38
End: 2023-02-14
Payer: COMMERCIAL

## 2023-02-14 DIAGNOSIS — F41.9 ANXIETY: ICD-10-CM

## 2023-02-14 DIAGNOSIS — Z00.00 HEALTH MAINTENANCE EXAMINATION: ICD-10-CM

## 2023-02-14 RX ORDER — ESCITALOPRAM OXALATE 10 MG/1
TABLET ORAL
Qty: 26 TABLET | Refills: 0 | OUTPATIENT
Start: 2023-02-14 | End: 2023-03-16

## 2023-02-14 RX ORDER — ESCITALOPRAM OXALATE 10 MG/1
10 TABLET ORAL DAILY
Qty: 90 TABLET | Refills: 0 | Status: SHIPPED | OUTPATIENT
Start: 2023-02-14 | End: 2023-03-23 | Stop reason: ALTCHOICE

## 2023-02-14 NOTE — TELEPHONE ENCOUNTER
No new care gaps identified.  [facility] Embedded Care Gaps. Reference number: [id number]. 2/14/2023   9:04:43 AM CST

## 2023-02-14 NOTE — TELEPHONE ENCOUNTER
No new care gaps identified.  [facility] Embedded Care Gaps. Reference number: [id number]. 2/14/2023   8:44:49 AM CST

## 2023-03-23 ENCOUNTER — LAB VISIT (OUTPATIENT)
Dept: LAB | Facility: OTHER | Age: 38
End: 2023-03-23
Attending: STUDENT IN AN ORGANIZED HEALTH CARE EDUCATION/TRAINING PROGRAM
Payer: COMMERCIAL

## 2023-03-23 ENCOUNTER — OFFICE VISIT (OUTPATIENT)
Dept: INTERNAL MEDICINE | Facility: CLINIC | Age: 38
End: 2023-03-23
Payer: COMMERCIAL

## 2023-03-23 VITALS
BODY MASS INDEX: 22.09 KG/M2 | WEIGHT: 116.94 LBS | DIASTOLIC BLOOD PRESSURE: 73 MMHG | SYSTOLIC BLOOD PRESSURE: 127 MMHG | HEART RATE: 63 BPM | OXYGEN SATURATION: 99 %

## 2023-03-23 DIAGNOSIS — Z00.00 HEALTH MAINTENANCE EXAMINATION: ICD-10-CM

## 2023-03-23 DIAGNOSIS — F41.9 ANXIETY: Primary | ICD-10-CM

## 2023-03-23 DIAGNOSIS — E53.8 VITAMIN B12 DEFICIENCY: ICD-10-CM

## 2023-03-23 DIAGNOSIS — Z13.6 SCREENING FOR CARDIOVASCULAR CONDITION: ICD-10-CM

## 2023-03-23 LAB
CHOLEST SERPL-MCNC: 198 MG/DL (ref 120–199)
CHOLEST/HDLC SERPL: 3.3 {RATIO} (ref 2–5)
HDLC SERPL-MCNC: 60 MG/DL (ref 40–75)
HDLC SERPL: 30.3 % (ref 20–50)
LDLC SERPL CALC-MCNC: 103.2 MG/DL (ref 63–159)
NONHDLC SERPL-MCNC: 138 MG/DL
TRIGL SERPL-MCNC: 174 MG/DL (ref 30–150)
VIT B12 SERPL-MCNC: 154 PG/ML (ref 210–950)

## 2023-03-23 PROCEDURE — 3078F PR MOST RECENT DIASTOLIC BLOOD PRESSURE < 80 MM HG: ICD-10-PCS | Mod: CPTII,S$GLB,, | Performed by: STUDENT IN AN ORGANIZED HEALTH CARE EDUCATION/TRAINING PROGRAM

## 2023-03-23 PROCEDURE — 86256 FLUORESCENT ANTIBODY TITER: CPT | Performed by: STUDENT IN AN ORGANIZED HEALTH CARE EDUCATION/TRAINING PROGRAM

## 2023-03-23 PROCEDURE — 99999 PR PBB SHADOW E&M-EST. PATIENT-LVL III: CPT | Mod: PBBFAC,,, | Performed by: STUDENT IN AN ORGANIZED HEALTH CARE EDUCATION/TRAINING PROGRAM

## 2023-03-23 PROCEDURE — 3044F PR MOST RECENT HEMOGLOBIN A1C LEVEL <7.0%: ICD-10-PCS | Mod: CPTII,S$GLB,, | Performed by: STUDENT IN AN ORGANIZED HEALTH CARE EDUCATION/TRAINING PROGRAM

## 2023-03-23 PROCEDURE — 1159F PR MEDICATION LIST DOCUMENTED IN MEDICAL RECORD: ICD-10-PCS | Mod: CPTII,S$GLB,, | Performed by: STUDENT IN AN ORGANIZED HEALTH CARE EDUCATION/TRAINING PROGRAM

## 2023-03-23 PROCEDURE — 99214 PR OFFICE/OUTPT VISIT, EST, LEVL IV, 30-39 MIN: ICD-10-PCS | Mod: S$GLB,,, | Performed by: STUDENT IN AN ORGANIZED HEALTH CARE EDUCATION/TRAINING PROGRAM

## 2023-03-23 PROCEDURE — 3074F PR MOST RECENT SYSTOLIC BLOOD PRESSURE < 130 MM HG: ICD-10-PCS | Mod: CPTII,S$GLB,, | Performed by: STUDENT IN AN ORGANIZED HEALTH CARE EDUCATION/TRAINING PROGRAM

## 2023-03-23 PROCEDURE — 99999 PR PBB SHADOW E&M-EST. PATIENT-LVL III: ICD-10-PCS | Mod: PBBFAC,,, | Performed by: STUDENT IN AN ORGANIZED HEALTH CARE EDUCATION/TRAINING PROGRAM

## 2023-03-23 PROCEDURE — 3044F HG A1C LEVEL LT 7.0%: CPT | Mod: CPTII,S$GLB,, | Performed by: STUDENT IN AN ORGANIZED HEALTH CARE EDUCATION/TRAINING PROGRAM

## 2023-03-23 PROCEDURE — 86364 TISS TRNSGLTMNASE EA IG CLAS: CPT | Performed by: STUDENT IN AN ORGANIZED HEALTH CARE EDUCATION/TRAINING PROGRAM

## 2023-03-23 PROCEDURE — 82607 VITAMIN B-12: CPT | Performed by: STUDENT IN AN ORGANIZED HEALTH CARE EDUCATION/TRAINING PROGRAM

## 2023-03-23 PROCEDURE — 3074F SYST BP LT 130 MM HG: CPT | Mod: CPTII,S$GLB,, | Performed by: STUDENT IN AN ORGANIZED HEALTH CARE EDUCATION/TRAINING PROGRAM

## 2023-03-23 PROCEDURE — 3008F PR BODY MASS INDEX (BMI) DOCUMENTED: ICD-10-PCS | Mod: CPTII,S$GLB,, | Performed by: STUDENT IN AN ORGANIZED HEALTH CARE EDUCATION/TRAINING PROGRAM

## 2023-03-23 PROCEDURE — 1159F MED LIST DOCD IN RCRD: CPT | Mod: CPTII,S$GLB,, | Performed by: STUDENT IN AN ORGANIZED HEALTH CARE EDUCATION/TRAINING PROGRAM

## 2023-03-23 PROCEDURE — 3078F DIAST BP <80 MM HG: CPT | Mod: CPTII,S$GLB,, | Performed by: STUDENT IN AN ORGANIZED HEALTH CARE EDUCATION/TRAINING PROGRAM

## 2023-03-23 PROCEDURE — 99214 OFFICE O/P EST MOD 30 MIN: CPT | Mod: S$GLB,,, | Performed by: STUDENT IN AN ORGANIZED HEALTH CARE EDUCATION/TRAINING PROGRAM

## 2023-03-23 PROCEDURE — 3008F BODY MASS INDEX DOCD: CPT | Mod: CPTII,S$GLB,, | Performed by: STUDENT IN AN ORGANIZED HEALTH CARE EDUCATION/TRAINING PROGRAM

## 2023-03-23 PROCEDURE — 80061 LIPID PANEL: CPT | Performed by: STUDENT IN AN ORGANIZED HEALTH CARE EDUCATION/TRAINING PROGRAM

## 2023-03-23 RX ORDER — SERTRALINE HYDROCHLORIDE 50 MG/1
50 TABLET, FILM COATED ORAL DAILY
Qty: 90 TABLET | Refills: 0 | Status: SHIPPED | OUTPATIENT
Start: 2023-03-23 | End: 2023-06-14 | Stop reason: SDUPTHER

## 2023-03-23 NOTE — PROGRESS NOTES
Subjective:       Patient ID: Olga Prater is a 37 y.o. female.    Chief Complaint: Anxiety [F41.9]    Patient is established with me, here today for the following:    Anxiety -   Started on Lexapro in JAN2023  Feels symptoms are improving with medication  However, experiencing fatigue with medication  Interested in trying alternative medication   Has not yet found counselor, difficulty with scheduling     Vitamin B12 deficiency -  Has not routinely been taking vitamin B12 supplementation   Last B12 in JAN2023 low  Due for recheck      Review of Systems   Constitutional:  Positive for fatigue. Negative for chills and fever.   Respiratory:  Negative for cough and shortness of breath.    Cardiovascular:  Negative for chest pain, palpitations and leg swelling.   Neurological:  Negative for syncope.       Current Outpatient Medications   Medication Instructions    drospirenone-e.estradioL-lm.FA (BEYAZ/KIESHA) 3-0.02-0.451 mg (24) (4) Tab Take 1 tablet by mouth once daily, skip placebo tablets.    EScitalopram oxalate (LEXAPRO) 10 mg, Oral, Daily    sumatriptan (IMITREX) 50 MG tablet Take 1 tablet by mouth as needed for migraines.  Repeat in 1 hour if needed    valACYclovir (VALTREX) 2,000 mg, Oral, 2 times daily     Objective:      Vitals:    03/23/23 1550   BP: 127/73   Pulse: 63   SpO2: 99%   Weight: 53 kg (116 lb 14.9 oz)   PainSc: 0-No pain     Body mass index is 22.09 kg/m².    Physical Exam  Vitals reviewed.   Constitutional:       General: She is not in acute distress.     Appearance: She is not ill-appearing or diaphoretic.   Pulmonary:      Effort: Pulmonary effort is normal.   Skin:     General: Skin is warm and dry.   Neurological:      Mental Status: She is alert. Mental status is at baseline.   Psychiatric:         Mood and Affect: Mood normal.         Behavior: Behavior normal.       Assessment:       1. Anxiety    2. Vitamin B12 deficiency        Plan:       Anxiety  Will change Lexapro to  Zoloft  Discussed finding counselor as schedule allows  RTC in 6-8 weeks for follow up.  -     sertraline (ZOLOFT) 50 MG tablet; Take 1 tablet (50 mg total) by mouth once daily.    Vitamin B12 deficiency  -     ANTI-PARIETAL ANTIBODY; Future  -     Celiac Disease Panel; Future  -     VITAMIN B12; Future      Rita Sotelo MD  3/23/2023

## 2023-03-27 DIAGNOSIS — E53.8 VITAMIN B12 DEFICIENCY: ICD-10-CM

## 2023-03-27 LAB
GLIADIN PEPTIDE IGA SER-ACNC: 0.9 U/ML
GLIADIN PEPTIDE IGG SER-ACNC: 0.7 U/ML
IGA SERPL-MCNC: 133 MG/DL (ref 70–400)
PCA AB TITR SER IF: NORMAL {TITER}
TTG IGA SER-ACNC: 0.4 U/ML
TTG IGG SER-ACNC: <0.6 U/ML

## 2023-03-27 RX ORDER — CYANOCOBALAMIN 1000 UG/ML
1000 INJECTION, SOLUTION INTRAMUSCULAR; SUBCUTANEOUS
OUTPATIENT
Start: 2023-03-28

## 2023-03-28 ENCOUNTER — PATIENT MESSAGE (OUTPATIENT)
Dept: INTERNAL MEDICINE | Facility: CLINIC | Age: 38
End: 2023-03-28
Payer: COMMERCIAL

## 2023-03-28 ENCOUNTER — TELEPHONE (OUTPATIENT)
Dept: INTERNAL MEDICINE | Facility: CLINIC | Age: 38
End: 2023-03-28
Payer: COMMERCIAL

## 2023-03-28 DIAGNOSIS — E53.8 VITAMIN B12 DEFICIENCY: Primary | ICD-10-CM

## 2023-03-28 NOTE — TELEPHONE ENCOUNTER
----- Message from Rita Sotelo MD sent at 3/27/2023  5:12 PM CDT -----  Please schedule patient for nurse visit for b12 injections weekly x4 weeks, started therapy plan for b12. Thank you!

## 2023-03-28 NOTE — TELEPHONE ENCOUNTER
----- Message from Monet Hi sent at 3/28/2023  9:16 AM CDT -----  Regarding: Return Call    Who Called:  Patient      Who Left Message for Patient:  Johnathon      Does the patient know what this is regarding? Regarding B12          Best Call Back Number: [phone]         Additional Information: Patient is returning a call.  Please assist.

## 2023-03-28 NOTE — TELEPHONE ENCOUNTER
Do I still need to send B12 to the pharmacy if it's a therapy plan or do we use the clinic supply? Thanks!

## 2023-03-28 NOTE — TELEPHONE ENCOUNTER
"I call pt. She doesn't answer. I leave her a brief vm.     Msg from MD:  "Your vitamin B12 is extremely low, which can cause memory and cognitive changes, fatigue, and numbness/tingling. Because of how low your levels are, I recommend starting vitamin B12 injections once weekly for the next month. Afterwards we can change to once monthly injections. We'll schedule an appointment to have you see our nurse for the vitamin B12 injection. We can recheck your levels in 6-8 weeks."     Also,  "Please schedule patient for nurse visit for b12 injections weekly x4 weeks, started therapy plan for b12. Thank you!"   "

## 2023-04-03 ENCOUNTER — CLINICAL SUPPORT (OUTPATIENT)
Dept: INTERNAL MEDICINE | Facility: CLINIC | Age: 38
End: 2023-04-03
Payer: COMMERCIAL

## 2023-04-03 DIAGNOSIS — E53.8 VITAMIN B12 DEFICIENCY: Primary | ICD-10-CM

## 2023-04-03 PROCEDURE — 99999 PR PBB SHADOW E&M-EST. PATIENT-LVL I: CPT | Mod: PBBFAC,,,

## 2023-04-03 PROCEDURE — 96372 PR INJECTION,THERAP/PROPH/DIAG2ST, IM OR SUBCUT: ICD-10-PCS | Mod: S$GLB,,, | Performed by: STUDENT IN AN ORGANIZED HEALTH CARE EDUCATION/TRAINING PROGRAM

## 2023-04-03 PROCEDURE — 96372 THER/PROPH/DIAG INJ SC/IM: CPT | Mod: S$GLB,,, | Performed by: STUDENT IN AN ORGANIZED HEALTH CARE EDUCATION/TRAINING PROGRAM

## 2023-04-03 PROCEDURE — 99999 PR PBB SHADOW E&M-EST. PATIENT-LVL I: ICD-10-PCS | Mod: PBBFAC,,,

## 2023-04-03 RX ORDER — CYANOCOBALAMIN 1000 UG/ML
1000 INJECTION, SOLUTION INTRAMUSCULAR; SUBCUTANEOUS
Status: COMPLETED | OUTPATIENT
Start: 2023-04-03 | End: 2023-04-03

## 2023-04-03 RX ORDER — CYANOCOBALAMIN 1000 UG/ML
1000 INJECTION, SOLUTION INTRAMUSCULAR; SUBCUTANEOUS
Status: CANCELLED | OUTPATIENT
Start: 2023-04-03 | End: 2023-04-03

## 2023-04-03 RX ADMIN — CYANOCOBALAMIN 1000 MCG: 1000 INJECTION, SOLUTION INTRAMUSCULAR; SUBCUTANEOUS at 03:04

## 2023-04-03 NOTE — PROGRESS NOTES
"Patient presented to the office for her Vitamin B12 injection. Per standing order, she was to receive 1mL of cyanocobalamin 1000mcg/mL. Patient requested the injection be given in the 1. The area of injection was identified using anatomical landmarks. This area was cleaned with alcohol. Using a 25g 1" safety needle, 1mL of cyanocobalamin 1000mcg/mL was placed into the muscle. Slight pressure was held on the site of injection once the needle was withdrawn. The injection site was dressed with a bandage. Patient experienced no complications and was discharged in stable condition. Cyanocobalamin Lot: [id number] Exp:      "

## 2023-04-05 ENCOUNTER — PATIENT MESSAGE (OUTPATIENT)
Dept: INTERNAL MEDICINE | Facility: CLINIC | Age: 38
End: 2023-04-05
Payer: COMMERCIAL

## 2023-04-05 DIAGNOSIS — E53.8 VITAMIN B12 DEFICIENCY: Primary | ICD-10-CM

## 2023-04-05 RX ORDER — CYANOCOBALAMIN 1000 UG/ML
1000 INJECTION, SOLUTION INTRAMUSCULAR; SUBCUTANEOUS WEEKLY
Qty: 3 ML | Refills: 0 | Status: SHIPPED | OUTPATIENT
Start: 2023-04-05 | End: 2023-04-27

## 2023-04-05 NOTE — TELEPHONE ENCOUNTER
Sent b12 to [facility] pharmacy, please assist patient with scheduling q1 week nurse visit for 3 weeks, thank you!

## 2023-04-10 ENCOUNTER — CLINICAL SUPPORT (OUTPATIENT)
Dept: INTERNAL MEDICINE | Facility: CLINIC | Age: 38
End: 2023-04-10
Payer: COMMERCIAL

## 2023-04-10 NOTE — PROGRESS NOTES
"Patient presented to the office for her Vitamin B12 injection. Per standing order, she was to receive 1 mL of cyanocobalamin 1000mcg/mL. Patient requested the injection be given in the Left Deltoid. The area of injection was identified using anatomical landmarks. This area was cleaned with alcohol. Using a 25 g 5/8" safety needle, 1 mL of cyanocobalamin 1000mcg/mL was placed into the muscle. Slight pressure was held on the site of injection once the needle was withdrawn. The injection site was dressed with a bandage. Patient experienced no complications and was discharged in stable condition. Cyanocobalamin Lot: [id number]  Exp: August 2024. DWAINE Bucio Patient brought in own vial      "

## 2023-04-17 ENCOUNTER — CLINICAL SUPPORT (OUTPATIENT)
Dept: INTERNAL MEDICINE | Facility: CLINIC | Age: 38
End: 2023-04-17
Payer: COMMERCIAL

## 2023-04-17 NOTE — PROGRESS NOTES
"Patient presented to the office for her Vitamin B12 injection. Per standing order, she was to receive 1mL of cyanocobalamin 1000mcg/mL. Patient requested the injection be given in the right deltoid . The area of injection was identified using anatomical landmarks. This area was cleaned with alcohol. Using a 25g 1" safety needle, 1mL of cyanocobalamin 1000mcg/mL was placed into the muscle. Slight pressure was held on the site of injection once the needle was withdrawn. The injection site was dressed with a bandage. Patient experienced no complications and was discharged in stable condition. Cyanocobalamin Lot:  Exp: 08/2024.  Patient brought in own medication    "

## 2023-04-18 ENCOUNTER — PATIENT MESSAGE (OUTPATIENT)
Dept: INTERNAL MEDICINE | Facility: CLINIC | Age: 38
End: 2023-04-18
Payer: COMMERCIAL

## 2023-04-18 DIAGNOSIS — E53.8 VITAMIN B12 DEFICIENCY: Primary | ICD-10-CM

## 2023-04-24 ENCOUNTER — CLINICAL SUPPORT (OUTPATIENT)
Dept: INTERNAL MEDICINE | Facility: CLINIC | Age: 38
End: 2023-04-24
Payer: COMMERCIAL

## 2023-05-08 ENCOUNTER — LAB VISIT (OUTPATIENT)
Dept: LAB | Facility: OTHER | Age: 38
End: 2023-05-08
Attending: STUDENT IN AN ORGANIZED HEALTH CARE EDUCATION/TRAINING PROGRAM
Payer: COMMERCIAL

## 2023-05-08 DIAGNOSIS — E53.8 VITAMIN B12 DEFICIENCY: ICD-10-CM

## 2023-05-08 LAB
BASOPHILS # BLD AUTO: 0.07 K/UL (ref 0–0.2)
BASOPHILS NFR BLD: 0.8 % (ref 0–1.9)
DIFFERENTIAL METHOD: NORMAL
EOSINOPHIL # BLD AUTO: 0.1 K/UL (ref 0–0.5)
EOSINOPHIL NFR BLD: 0.9 % (ref 0–8)
ERYTHROCYTE [DISTWIDTH] IN BLOOD BY AUTOMATED COUNT: 12.7 % (ref 11.5–14.5)
FOLATE SERPL-MCNC: 16.8 NG/ML (ref 4–24)
HCT VFR BLD AUTO: 39.9 % (ref 37–48.5)
HCYS SERPL-SCNC: 5.3 UMOL/L (ref 4–15.5)
HGB BLD-MCNC: 13 G/DL (ref 12–16)
IMM GRANULOCYTES # BLD AUTO: 0.04 K/UL (ref 0–0.04)
IMM GRANULOCYTES NFR BLD AUTO: 0.4 % (ref 0–0.5)
LYMPHOCYTES # BLD AUTO: 2.1 K/UL (ref 1–4.8)
LYMPHOCYTES NFR BLD: 23 % (ref 18–48)
MCH RBC QN AUTO: 28.2 PG (ref 27–31)
MCHC RBC AUTO-ENTMCNC: 32.6 G/DL (ref 32–36)
MCV RBC AUTO: 87 FL (ref 82–98)
MONOCYTES # BLD AUTO: 0.6 K/UL (ref 0.3–1)
MONOCYTES NFR BLD: 6.9 % (ref 4–15)
NEUTROPHILS # BLD AUTO: 6.2 K/UL (ref 1.8–7.7)
NEUTROPHILS NFR BLD: 68 % (ref 38–73)
NRBC BLD-RTO: 0 /100 WBC
PLATELET # BLD AUTO: 287 K/UL (ref 150–450)
PMV BLD AUTO: 10.5 FL (ref 9.2–12.9)
RBC # BLD AUTO: 4.61 M/UL (ref 4–5.4)
VIT B12 SERPL-MCNC: 527 PG/ML (ref 210–950)
WBC # BLD AUTO: 9.12 K/UL (ref 3.9–12.7)

## 2023-05-08 PROCEDURE — 85025 COMPLETE CBC W/AUTO DIFF WBC: CPT | Performed by: STUDENT IN AN ORGANIZED HEALTH CARE EDUCATION/TRAINING PROGRAM

## 2023-05-08 PROCEDURE — 36415 COLL VENOUS BLD VENIPUNCTURE: CPT | Performed by: STUDENT IN AN ORGANIZED HEALTH CARE EDUCATION/TRAINING PROGRAM

## 2023-05-08 PROCEDURE — 82746 ASSAY OF FOLIC ACID SERUM: CPT | Performed by: STUDENT IN AN ORGANIZED HEALTH CARE EDUCATION/TRAINING PROGRAM

## 2023-05-08 PROCEDURE — 82607 VITAMIN B-12: CPT | Performed by: STUDENT IN AN ORGANIZED HEALTH CARE EDUCATION/TRAINING PROGRAM

## 2023-05-08 PROCEDURE — 83921 ORGANIC ACID SINGLE QUANT: CPT | Performed by: STUDENT IN AN ORGANIZED HEALTH CARE EDUCATION/TRAINING PROGRAM

## 2023-05-08 PROCEDURE — 83090 ASSAY OF HOMOCYSTEINE: CPT | Performed by: STUDENT IN AN ORGANIZED HEALTH CARE EDUCATION/TRAINING PROGRAM

## 2023-05-10 DIAGNOSIS — E53.8 VITAMIN B12 DEFICIENCY: Primary | ICD-10-CM

## 2023-05-12 LAB — METHYLMALONATE SERPL-SCNC: 0.11 UMOL/L

## 2023-06-06 ENCOUNTER — PATIENT MESSAGE (OUTPATIENT)
Dept: INTERNAL MEDICINE | Facility: CLINIC | Age: 38
End: 2023-06-06
Payer: COMMERCIAL

## 2023-06-14 DIAGNOSIS — F41.9 ANXIETY: ICD-10-CM

## 2023-06-14 RX ORDER — SERTRALINE HYDROCHLORIDE 50 MG/1
50 TABLET, FILM COATED ORAL DAILY
Qty: 90 TABLET | Refills: 3 | Status: SHIPPED | OUTPATIENT
Start: 2023-06-14 | End: 2023-09-19 | Stop reason: SDUPTHER

## 2023-06-14 NOTE — TELEPHONE ENCOUNTER
Refill Routing Note   Medication(s) are not appropriate for processing by [facility] for the following reason(s):      New or recently adjusted medication    [facility] action(s):  Defer Care Due:  None identified          Appointments  past 12m or future 3m with PCP    Date Provider   Last Visit   3/23/2023 Rita Sotelo MD   Next Visit   9/19/2023 Rita Sotelo MD   ED visits in past 90 days: 0        Note composed:2:19 PM 06/14/2023

## 2023-06-14 NOTE — TELEPHONE ENCOUNTER
No care due was identified.  [facility] Embedded Care Due Messages. Reference number: [id number].   6/14/2023 9:58:47 AM CDT

## 2023-09-18 ENCOUNTER — LAB VISIT (OUTPATIENT)
Dept: LAB | Facility: OTHER | Age: 38
End: 2023-09-18
Attending: STUDENT IN AN ORGANIZED HEALTH CARE EDUCATION/TRAINING PROGRAM

## 2023-09-18 DIAGNOSIS — E53.8 VITAMIN B12 DEFICIENCY: ICD-10-CM

## 2023-09-18 LAB
BASOPHILS # BLD AUTO: 0.05 K/UL (ref 0–0.2)
BASOPHILS NFR BLD: 0.6 % (ref 0–1.9)
DIFFERENTIAL METHOD: NORMAL
EOSINOPHIL # BLD AUTO: 0.1 K/UL (ref 0–0.5)
EOSINOPHIL NFR BLD: 0.8 % (ref 0–8)
ERYTHROCYTE [DISTWIDTH] IN BLOOD BY AUTOMATED COUNT: 12.4 % (ref 11.5–14.5)
HCT VFR BLD AUTO: 37.9 % (ref 37–48.5)
HGB BLD-MCNC: 12.5 G/DL (ref 12–16)
IMM GRANULOCYTES # BLD AUTO: 0.02 K/UL (ref 0–0.04)
IMM GRANULOCYTES NFR BLD AUTO: 0.2 % (ref 0–0.5)
LYMPHOCYTES # BLD AUTO: 2.7 K/UL (ref 1–4.8)
LYMPHOCYTES NFR BLD: 31.5 % (ref 18–48)
MCH RBC QN AUTO: 28.2 PG (ref 27–31)
MCHC RBC AUTO-ENTMCNC: 33 G/DL (ref 32–36)
MCV RBC AUTO: 85 FL (ref 82–98)
MONOCYTES # BLD AUTO: 0.7 K/UL (ref 0.3–1)
MONOCYTES NFR BLD: 8.1 % (ref 4–15)
NEUTROPHILS # BLD AUTO: 5 K/UL (ref 1.8–7.7)
NEUTROPHILS NFR BLD: 58.8 % (ref 38–73)
NRBC BLD-RTO: 0 /100 WBC
PLATELET # BLD AUTO: 284 K/UL (ref 150–450)
PMV BLD AUTO: 10.4 FL (ref 9.2–12.9)
RBC # BLD AUTO: 4.44 M/UL (ref 4–5.4)
VIT B12 SERPL-MCNC: 507 PG/ML (ref 210–950)
WBC # BLD AUTO: 8.47 K/UL (ref 3.9–12.7)

## 2023-09-18 PROCEDURE — 36415 COLL VENOUS BLD VENIPUNCTURE: CPT | Performed by: STUDENT IN AN ORGANIZED HEALTH CARE EDUCATION/TRAINING PROGRAM

## 2023-09-18 PROCEDURE — 85025 COMPLETE CBC W/AUTO DIFF WBC: CPT | Performed by: STUDENT IN AN ORGANIZED HEALTH CARE EDUCATION/TRAINING PROGRAM

## 2023-09-18 PROCEDURE — 82607 VITAMIN B-12: CPT | Performed by: STUDENT IN AN ORGANIZED HEALTH CARE EDUCATION/TRAINING PROGRAM

## 2023-09-19 ENCOUNTER — OFFICE VISIT (OUTPATIENT)
Dept: INTERNAL MEDICINE | Facility: CLINIC | Age: 38
End: 2023-09-19

## 2023-09-19 VITALS
DIASTOLIC BLOOD PRESSURE: 78 MMHG | BODY MASS INDEX: 23.62 KG/M2 | OXYGEN SATURATION: 98 % | SYSTOLIC BLOOD PRESSURE: 110 MMHG | WEIGHT: 125 LBS | HEART RATE: 62 BPM

## 2023-09-19 DIAGNOSIS — E53.8 VITAMIN B12 DEFICIENCY: Primary | ICD-10-CM

## 2023-09-19 DIAGNOSIS — F41.1 GENERALIZED ANXIETY DISORDER: ICD-10-CM

## 2023-09-19 PROCEDURE — 99213 OFFICE O/P EST LOW 20 MIN: CPT | Mod: S$GLB,,, | Performed by: STUDENT IN AN ORGANIZED HEALTH CARE EDUCATION/TRAINING PROGRAM

## 2023-09-19 PROCEDURE — 99999 PR PBB SHADOW E&M-EST. PATIENT-LVL III: ICD-10-PCS | Mod: PBBFAC,,, | Performed by: STUDENT IN AN ORGANIZED HEALTH CARE EDUCATION/TRAINING PROGRAM

## 2023-09-19 PROCEDURE — 99999 PR PBB SHADOW E&M-EST. PATIENT-LVL III: CPT | Mod: PBBFAC,,, | Performed by: STUDENT IN AN ORGANIZED HEALTH CARE EDUCATION/TRAINING PROGRAM

## 2023-09-19 PROCEDURE — 99213 PR OFFICE/OUTPT VISIT, EST, LEVL III, 20-29 MIN: ICD-10-PCS | Mod: S$GLB,,, | Performed by: STUDENT IN AN ORGANIZED HEALTH CARE EDUCATION/TRAINING PROGRAM

## 2023-09-19 RX ORDER — SERTRALINE HYDROCHLORIDE 50 MG/1
50 TABLET, FILM COATED ORAL DAILY
Qty: 90 TABLET | Refills: 3 | Status: SHIPPED | OUTPATIENT
Start: 2023-09-19

## 2023-09-19 NOTE — PROGRESS NOTES
Subjective:       Patient ID: Olga Prater is a 38 y.o. female.    Chief Complaint: Vitamin B12 deficiency [E53.8]    Patient is established with me, here today for the following:    Vitamin B12 deficiency -  Currently taking vitamin B12 1000 mcg daily supplementation.  Lab Results       Component                Value               Date                       [id number]               507                 09/18/2023              Taking Zoloft for anxiety with good effect  Denies side effects or concerns while taking  Did not yet start counseling          Review of Systems   Constitutional:  Negative for activity change, fatigue and fever.   Respiratory:  Negative for cough and shortness of breath.    Cardiovascular:  Negative for chest pain and palpitations.   Gastrointestinal:  Negative for abdominal pain, constipation, diarrhea, nausea and vomiting.   Neurological:  Negative for syncope and headaches.         Current Outpatient Medications   Medication Instructions    drospirenone-e.estradioL-lm.FA (BEYAZ/KIESHA) 3-0.02-0.451 mg (24) (4) Tab Take 1 tablet by mouth once daily, skip placebo tablets.    sertraline (ZOLOFT) 50 mg, Oral, Daily    sumatriptan (IMITREX) 50 MG tablet Take 1 tablet by mouth as needed for migraines.  Repeat in 1 hour if needed    valACYclovir (VALTREX) 2,000 mg, Oral, 2 times daily     Objective:      Vitals:    09/19/23 1439   BP: 110/78   Pulse: 62   SpO2: 98%   Weight: 56.7 kg (125 lb)   PainSc: 0-No pain     Body mass index is 23.62 kg/m².    Physical Exam  Vitals reviewed.   Constitutional:       General: She is not in acute distress.     Appearance: She is not ill-appearing or diaphoretic.   Pulmonary:      Effort: Pulmonary effort is normal.   Skin:     General: Skin is warm and dry.   Neurological:      Mental Status: She is alert. Mental status is at baseline.   Psychiatric:         Mood and Affect: Mood normal.         Behavior: Behavior normal.         Assessment:       1.  Vitamin B12 deficiency    2. Generalized anxiety disorder        Plan:       Vitamin B12 deficiency  Continue supplementation.  RTC in 6 months for follow up.    Generalized anxiety disorder  Continue supplementation.  RTC in 6 months for follow up.  -     sertraline (ZOLOFT) 50 MG tablet; Take 1 tablet (50 mg total) by mouth once daily.        Rita Sotelo MD  9/19/2023

## 2023-11-30 DIAGNOSIS — G43.809 OTHER MIGRAINE WITHOUT STATUS MIGRAINOSUS, NOT INTRACTABLE: ICD-10-CM

## 2023-11-30 DIAGNOSIS — Z30.9 ENCOUNTER FOR CONTRACEPTIVE MANAGEMENT, UNSPECIFIED TYPE: ICD-10-CM

## 2023-11-30 RX ORDER — SUMATRIPTAN 50 MG/1
TABLET, FILM COATED ORAL
Qty: 30 TABLET | Refills: 12 | Status: SHIPPED | OUTPATIENT
Start: 2023-11-30

## 2023-11-30 RX ORDER — DROSPIRENONE, ETHINYL ESTRADIOL AND LEVOMEFOLATE CALCIUM AND LEVOMEFOLATE CALCIUM 3-0.02(24)
1 KIT ORAL DAILY
Qty: 112 TABLET | Refills: 4 | Status: SHIPPED | OUTPATIENT
Start: 2023-11-30 | End: 2024-11-29

## 2024-05-22 ENCOUNTER — PATIENT MESSAGE (OUTPATIENT)
Dept: INTERNAL MEDICINE | Facility: CLINIC | Age: 39
End: 2024-05-22

## 2024-05-23 NOTE — TELEPHONE ENCOUNTER
Weight gain is possible side effect of Zoloft. Would recommend appointment to discuss risk/benefit of Wellbutrin for anxiety. Please schedule appt asap, ok to override, thank you!

## 2024-05-31 ENCOUNTER — PATIENT MESSAGE (OUTPATIENT)
Dept: INTERNAL MEDICINE | Facility: CLINIC | Age: 39
End: 2024-05-31

## 2024-06-10 DIAGNOSIS — G43.809 OTHER MIGRAINE WITHOUT STATUS MIGRAINOSUS, NOT INTRACTABLE: ICD-10-CM

## 2024-06-10 DIAGNOSIS — Z30.9 ENCOUNTER FOR CONTRACEPTIVE MANAGEMENT, UNSPECIFIED TYPE: ICD-10-CM

## 2024-06-10 DIAGNOSIS — F41.1 GENERALIZED ANXIETY DISORDER: ICD-10-CM

## 2024-06-10 RX ORDER — SERTRALINE HYDROCHLORIDE 50 MG/1
50 TABLET, FILM COATED ORAL DAILY
Qty: 90 TABLET | Refills: 3 | Status: SHIPPED | OUTPATIENT
Start: 2024-06-10

## 2024-06-10 NOTE — TELEPHONE ENCOUNTER
No care due was identified.  [facility] Embedded Care Due Messages. Reference number: [id number].   6/10/2024 11:31:11 AM CDT

## 2024-06-11 DIAGNOSIS — G43.809 OTHER MIGRAINE WITHOUT STATUS MIGRAINOSUS, NOT INTRACTABLE: ICD-10-CM

## 2024-06-11 DIAGNOSIS — Z30.9 ENCOUNTER FOR CONTRACEPTIVE MANAGEMENT, UNSPECIFIED TYPE: ICD-10-CM

## 2024-06-11 RX ORDER — DROSPIRENONE, ETHINYL ESTRADIOL AND LEVOMEFOLATE CALCIUM AND LEVOMEFOLATE CALCIUM 3-0.02(24)
1 KIT ORAL DAILY
Qty: 112 TABLET | Refills: 4 | OUTPATIENT
Start: 2024-06-11 | End: 2025-06-11

## 2024-06-11 RX ORDER — SUMATRIPTAN 50 MG/1
TABLET, FILM COATED ORAL
Qty: 30 TABLET | Refills: 12 | OUTPATIENT
Start: 2024-06-11

## 2024-06-11 RX ORDER — SUMATRIPTAN 50 MG/1
TABLET, FILM COATED ORAL
Qty: 30 TABLET | Refills: 12 | Status: SHIPPED | OUTPATIENT
Start: 2024-06-11

## 2024-06-11 RX ORDER — DROSPIRENONE, ETHINYL ESTRADIOL AND LEVOMEFOLATE CALCIUM AND LEVOMEFOLATE CALCIUM 3-0.02(24)
1 KIT ORAL DAILY
Qty: 112 TABLET | Refills: 4 | Status: SHIPPED | OUTPATIENT
Start: 2024-06-11 | End: 2025-06-11

## 2024-08-05 ENCOUNTER — PATIENT OUTREACH (OUTPATIENT)
Dept: ADMINISTRATIVE | Facility: HOSPITAL | Age: 39
End: 2024-08-05

## 2024-09-24 NOTE — PROGRESS NOTES
"Patient presented to the office for her Vitamin B12 injection. Per standing order, she was to receive 1mL of cyanocobalamin 1000mcg/mL. Patient requested the injection be given in the Right deltoid. The area of injection was identified using anatomical landmarks. This area was cleaned with alcohol. Using a 25 g 1 " safety needle, 1 mL of cyanocobalamin 1000mcg/mL was placed into the muscle. Slight pressure was held on the site of injection once the needle was withdrawn. The injection site was dressed with a bandage. Patient experienced no complications and was discharged in stable condition. Cyanocobalamin Lot: [id number] Exp: August 2024 Patient brought in vial.  States has no response from MD regarding future lab. Informed that the MD responded to her 04/19 /2023 at 1645  with a note to have lab done . ESTELA Cary      " At [facility], we strive to deliver an exceptional experience to you, every time we see you. If you receive a survey, please let us know what we are doing well and/or what we could improve upon, as we do value your feedback.  If you have MyChart, you can expect to receive results automatically within 24 hours of their completion.  Your provider will send a note interpreting your results as well.   If you do not have MyChart, you should receive your results in about a week by mail.    Your care team:                            Family Medicine Internal Medicine   MD Kulwinder Vargas, MD Yesica Cui, MD Preston Gomez, MD Radha Gutierres, PATimothyC    Bruce Childs, MD Pediatrics   Alena Wynne, MD Gissell Aiken, MD Deepa Barrera, APRN CNP Ronda Sandoval APRN CNP   Patrizia Muñoz, MD Guillermina Rod, MD Taryn Medina, CNP     Modesto Perez, CNP Same-Day Provider (No follow-up visits)   Cherelle Matthews, APRN, DNP Joanna Bernard, ANGIE Mata APRN, FNP, BC ROHIT MalaveC     Clinic hours: Monday - Thursday 7 am-6 pm; Fridays 7 am-5 pm.   Urgent care: Monday - Friday 10 am- 8 pm; Saturday and Sunday 9 am-5 pm.    Clinic: [phone]       [facility]: Monday - Thursday 8 am - 7 pm; Friday 8 am - 6 pm  [facility]: [phone]    Patient Education   Consejos de atención preventiva  Se trata de consejos generales que solemos ofrecer para ayudar a las personas a mantenerse saludables. Rosado equipo de atención puede darle consejos específicos. Hable con ellos sobre radha propias necesidades de atención preventiva.  Estilo de cherrie  Ejercítese, fausto mínimo, 150 minutos a la semana (30 minutos al día, 5 días a la semana).  Realice actividades de fortalecimiento muscular 2 días a la semana, ya que contribuyen al control del peso y a la prevención de enfermedades.  No fume.  Use protector solar  "para prevenir el cáncer de piel.  Cada 2 a 5 años, realice pruebas de detección de radón en prado hogar. Se trata de un gas incoloro e inodoro que puede dañar los pulmones. Para obtener más información, visite [email or web address] y busque \"Radon in Homes\" (Radón en los hogares).  Mantenga las say descargadas y bajo llave en un lugar seguro, fausto darren caja yusef o darren cámara blindada, o use un candado para say y esconda las llaves. Guarde siempre las balas por separado. Para obtener más información, visite [employer].[place].gov y busque \"Safe Gun Storage\" (Almacenamiento seguro laci).  Alimentación  Consuma 5 o más porciones de frutas y verduras al día.  Pruebe el pan de kamila, el arroz integral y la pasta integral (en lugar de pan marquis, arroz marquis y pasta).  Consuma suficiente calcio y vitamina D. Revise la etiqueta de los alimentos y procure alcanzar el 100 % de la ingesta diaria recomendada (IDR).  Exámenes periódicos  Hágase un examen dental y darren limpieza cada 6 meses.  Visite a prado equipo de atención médica todos los años para hablar sobre lo siguiente:  Todo cambio en prado bo.  Todo medicamento que prado equipo de atención le haya recetado.  Atención preventiva, planificación familiar y formas de prevenir enfermedades crónicas.  Inyecciones (vacunas)   Vacunas contra el virus del papiloma humano (VPH) (hasta los 26 años), si nunca se las tejeda colocado.  Vacunas contra la hepatitis B (hasta los 59 años), si nunca se las tejeda colocado.  Vacuna contra la COVID-19: vacúnese cuando corresponda.  Vacuna contra la gripe: vacúnese contra la gripe todos los años.  Vacuna contra el tétanos: vacúnese contra el tétanos cada 10 años.  Vacunas contra el neumococo, la hepatitis A y el virus sincicial respiratorio (VSR): pregunte a prado equipo de atención si las necesita en función de prado riesgo.  Vacuna contra el herpes zóster (para personas de 50 años o más).  Pruebas médicas generales  Examen de detección de diabetes:  A partir " de los 35 años, hágase exámenes de detección de diabetes, fausto mínimo, cada 3 años.  Si tiene menos de 35 años, pregunte a prado equipo de atención si debe hacerlo.  Prueba de colesterol: a los 39 años, comience a hacerse darren prueba de colesterol cada 5 años o con mayor frecuencia si se lo aconsejan.  Exploración de densidad ósea (DEXA): a los 50 años, pregunte a prado equipo de atención si debe hacerse esta exploración para detectar osteoporosis (fragilidad en los huesos).  Hepatitis C: hágase la prueba, fausto mínimo, darren vez en la cherrie.  Examen de detección de aneurismas aórticos abdominales: hable con prado médico sobre la posibilidad de hacerse toya examen de detección si cumple alguna de las siguientes condiciones:  fumó alguna vez;  y  es hombre según prado sexo biológico;  y  tiene entre 65 y 75 años.  Infecciones de transmisión sexual (ITS)  Antes de los 24 años, pregunte a prado equipo de atención si debe hacerse exámenes de detección de ITS.  Después de los 24 años, hágase exámenes de detección de ITS si está en riesgo. Está en riesgo de contraer alguna ITS (incluido el virus de la inmunodeficiencia adquirida [VIH]) en los siguientes casos:  Tiene relaciones sexuales con más de darren persona.  No usa preservativos siempre que tiene relaciones sexuales.  A usted o a prado cyn le diagnosticaron darren infección de transmisión sexual.  Si está en riesgo de contraer el VIH, consulte sobre los medicamentos de la profilaxis de preexposición (Pre-Exposure Prophylaxis, PrEP) para prevenirlo.  Hágase la prueba del VIH, fausto mínimo, darren vez en la cherrie, tanto si está en riesgo de contraer el virus fausto si no.  Pruebas de detección de cáncer  Examen de detección de cáncer de alejandro uterino: si tiene alejandro uterino, comience a hacerse pruebas periódicas de detección de cáncer de alejandro uterino a los 21 años. La mayoría de las personas que se hacen exámenes periódicos de detección y obtienen resultados normales pueden dejar de hacerlo a  partir de los 65 años. Hable sobre esto con prado proveedor.  Exploración de detección de cáncer de mama (mamografía): si alguna vez ha tenido mamas, comience a hacerse mamografías periódicas a partir de los 40 años. Se trata de darren exploración para detectar el cáncer de mama.  Examen de detección de cáncer de colon: es importante comenzar a hacerse los exámenes de detección de cáncer de colon a los 45 años.  Hágase darren colonoscopía cada 10 años (o con más frecuencia si está en riesgo) O pregunte a prado proveedor sobre pruebas de heces, fausto la prueba inmunoquímica fecal (Fecal Immunochemical Test, FIT) cada año o la prueba Cologuard cada 3 años.  Para obtener más información sobre las opciones de las pruebas que tiene a disposición, visite: www.fvfiles.com/100129uv.  Si necesita ayuda para harry darren decisión, visite: meg.evelia/nj48516oc.  Prueba de detección de cáncer de próstata: si tiene próstata y está entre los 55 y 69 años, pregunte a prado proveedor si le convendría hacerse darren prueba anual de detección de cáncer de próstata.  Examen de detección de cáncer de pulmón: si fuma o fumó y tiene entre 50 y 80 años, pregunte a prado equipo de atención si los exámenes continuos de detección de cáncer de pulmón son adecuados para usted.    Solo para uso con fines informativos. Marilyn documento no pretende sustituir ninguna recomendación médica. Derechos de autor   2023 [employer].   Todos los derechos reservados. Revisión clínica a cargo de  [employer] [id number] - REV 04/24.

## 2024-11-12 ENCOUNTER — OFFICE VISIT (OUTPATIENT)
Dept: URGENT CARE | Facility: CLINIC | Age: 39
End: 2024-11-12
Payer: COMMERCIAL

## 2024-11-12 VITALS
TEMPERATURE: 99 F | OXYGEN SATURATION: 98 % | RESPIRATION RATE: 20 BRPM | HEART RATE: 64 BPM | HEIGHT: 61 IN | DIASTOLIC BLOOD PRESSURE: 82 MMHG | SYSTOLIC BLOOD PRESSURE: 124 MMHG | WEIGHT: 126 LBS | BODY MASS INDEX: 23.79 KG/M2

## 2024-11-12 DIAGNOSIS — J02.9 SORE THROAT: Primary | ICD-10-CM

## 2024-11-12 DIAGNOSIS — J02.9 ACUTE VIRAL PHARYNGITIS: ICD-10-CM

## 2024-11-12 LAB
CTP QC/QA: YES
MOLECULAR STREP A: NEGATIVE

## 2024-11-12 PROCEDURE — 87651 STREP A DNA AMP PROBE: CPT | Mod: QW,S$GLB,, | Performed by: FAMILY MEDICINE

## 2024-11-12 PROCEDURE — 99213 OFFICE O/P EST LOW 20 MIN: CPT | Mod: S$GLB,,, | Performed by: FAMILY MEDICINE

## 2024-11-12 RX ORDER — ESCITALOPRAM OXALATE 10 MG/1
1 TABLET ORAL DAILY
COMMUNITY
Start: 2024-07-29 | End: 2025-04-25

## 2024-11-12 NOTE — PROGRESS NOTES
"Subjective:      Patient ID: Olga Prater is a 39 y.o. female.    Vitals:  height is 5' 1" (1.549 m) and weight is 57.2 kg (126 lb). Her tympanic temperature is 98.8 °F (37.1 °C). Her blood pressure is 124/82 and her pulse is 64. Her respiration is 20 and oxygen saturation is 98%.     Chief Complaint: Sore Throat    39 year old female c/o sore throat that began yesterday.  Patient is exposed to strep from her child.  Patient took OTC Advil with mild relief.     Pt is a 40 yo F who presents with sore throat and fatigue that started yesterday.  Denies fever, body aches and chills.  Her child was diagnosed with strep 1 week ago and treated with amoxicillin.  She has take Advil for her symptoms with some relief    Sore Throat   This is a new problem. The current episode started yesterday. The problem has been gradually worsening. Neither side of throat is experiencing more pain than the other. There has been no fever. The pain is at a severity of 3/10. The pain is mild. Associated symptoms include a hoarse voice and swollen glands. Pertinent negatives include no abdominal pain, congestion, coughing, diarrhea, ear discharge, ear pain, headaches, plugged ear sensation, neck pain, shortness of breath, trouble swallowing or vomiting. She has had exposure to strep. She has had no exposure to mono. She has tried NSAIDs for the symptoms. The treatment provided mild relief.       HENT:  Positive for sore throat. Negative for ear pain, ear discharge, congestion and trouble swallowing.    Neck: Negative for neck pain.   Respiratory:  Negative for cough and shortness of breath.    Gastrointestinal:  Negative for abdominal pain, vomiting and diarrhea.   Neurological:  Negative for headaches.      Objective:     Physical Exam   Constitutional: She is oriented to person, place, and time. She appears well-developed. She is cooperative.  Non-toxic appearance. She does not appear ill. No distress.   HENT:   Head: Normocephalic " and atraumatic.   Ears:   Right Ear: Hearing, tympanic membrane, external ear and ear canal normal.   Left Ear: Hearing, tympanic membrane, external ear and ear canal normal.   Nose: Nose normal. No mucosal edema, rhinorrhea or nasal deformity. No epistaxis. Right sinus exhibits no maxillary sinus tenderness and no frontal sinus tenderness. Left sinus exhibits no maxillary sinus tenderness and no frontal sinus tenderness.   Mouth/Throat: Uvula is midline and mucous membranes are normal. No trismus in the jaw. Normal dentition. No uvula swelling. Oropharyngeal exudate and posterior oropharyngeal erythema present. No posterior oropharyngeal edema. Tonsils are 0 on the right. Tonsils are 0 on the left. No tonsillar exudate.   Eyes: Conjunctivae and lids are normal. No scleral icterus.   Neck: Trachea normal and phonation normal. Neck supple. No edema present. No erythema present. No neck rigidity present.   Cardiovascular: Normal rate, regular rhythm, normal heart sounds and normal pulses.   Pulmonary/Chest: Effort normal and breath sounds normal. No respiratory distress. She has no decreased breath sounds. She has no rhonchi.   Abdominal: Normal appearance.   Musculoskeletal: Normal range of motion.         General: No deformity. Normal range of motion.   Neurological: She is alert and oriented to person, place, and time. She exhibits normal muscle tone. Coordination normal.   Skin: Skin is warm, dry, intact, not diaphoretic and not pale.   Psychiatric: Her speech is normal and behavior is normal. Judgment and thought content normal.   Nursing note and vitals reviewed.    Results for orders placed or performed in visit on 11/12/24   POCT Strep A, Molecular    Collection Time: 11/12/24  9:40 AM   Result Value Ref Range    Molecular Strep A, POC Negative Negative     Acceptable Yes          Assessment:     1. Sore throat    2. Acute viral pharyngitis        Plan:       Sore throat  -     POCT Strep A,  Molecular    Acute viral pharyngitis             Patient Instructions   Rest  Fluids  Warm tea with honey and lemon  Warm salt water gargles  Throat lozenges  popsicles  Nasal saline  Tylenol or advil for pain or fever  If no improvement or worsening, return for re-evaluation      You must understand that you have received treatment at an Urgent Care facility only, and that you may be  released before all of your medical problems are known or treated. Urgent Care facilities are not equipped to  handle life threatening emergencies. It is recommended that you seek care at an Emergency Department for  further evaluation of worsening or concerning symptoms, or possibly life threatening conditions as  discussed.      If you develop chest pain, shortness of breath, throat swelling, tongue swelling, lightheadedness or any other causes for concern, proceed to ER.

## 2024-11-12 NOTE — PATIENT INSTRUCTIONS
Rest  Fluids  Warm tea with honey and lemon  Warm salt water gargles  Throat lozenges  popsicles  Nasal saline  Tylenol or advil for pain or fever  If no improvement or worsening, return for re-evaluation      You must understand that you have received treatment at an Urgent Care facility only, and that you may be  released before all of your medical problems are known or treated. Urgent Care facilities are not equipped to  handle life threatening emergencies. It is recommended that you seek care at an Emergency Department for  further evaluation of worsening or concerning symptoms, or possibly life threatening conditions as  discussed.      If you develop chest pain, shortness of breath, throat swelling, tongue swelling, lightheadedness or any other causes for concern, proceed to ER.

## 2025-08-10 DIAGNOSIS — Z30.9 ENCOUNTER FOR CONTRACEPTIVE MANAGEMENT, UNSPECIFIED TYPE: ICD-10-CM

## 2025-08-11 RX ORDER — DROSPIRENONE, ETHINYL ESTRADIOL AND LEVOMEFOLATE CALCIUM AND LEVOMEFOLATE CALCIUM 3-0.02(24)
1 KIT ORAL DAILY
Qty: 112 TABLET | Refills: 4 | Status: SHIPPED | OUTPATIENT
Start: 2025-08-11 | End: 2026-08-11

## (undated) DEVICE — SUT VICRYL 3-0 27 SH

## (undated) DEVICE — SUT 2/0 27IN PLAIN GUT CT

## (undated) DEVICE — Device

## (undated) DEVICE — GAUZE SPONGE 4X4 12PLY

## (undated) DEVICE — SUT MCRYL PLUS 4-0 PS2 27IN

## (undated) DEVICE — DRESSING TELFA N ADH 3X8

## (undated) DEVICE — ELECTRODE REM PLYHSV RETURN 9

## (undated) DEVICE — APPLICATOR CHLORAPREP ORN 26ML

## (undated) DEVICE — GOWN SURGICAL X-LARGE

## (undated) DEVICE — SUT VICRYL 2-0 36 CT-1

## (undated) DEVICE — CLIPPER BLADE MOD 4406 (CAREF)

## (undated) DEVICE — SUT CTD VICRYL 0 UND BR SUT

## (undated) DEVICE — DRESSING ABSRBNT ISLAND 3.6X8

## (undated) DEVICE — SUT MONOCRYL PLUS UD 3-0 27

## (undated) DEVICE — SPONGE LAP 18X18 PREWASHED

## (undated) DEVICE — SEE MEDLINE ITEM 146417

## (undated) DEVICE — DRAPE LAP TIBURON 77X122IN

## (undated) DEVICE — WARMER DRAPE STERILE LF

## (undated) DEVICE — SUT VICRYL 0 MO-4 CR/8 18

## (undated) DEVICE — CLOSURE SKIN STERI STRIP 1/2X4

## (undated) DEVICE — SUT VICRYL PLUS 0 CT1 36IN

## (undated) DEVICE — SEPRAFILM 3 X 5  MULTI-PACK

## (undated) DEVICE — SYR 10CC LUER LOCK

## (undated) DEVICE — SUT VICRYL PLUS ANTIBACT

## (undated) DEVICE — ELECTRODE EXTENDED BLADE

## (undated) DEVICE — POWDER ARISTA AH 3G

## (undated) DEVICE — TRAY FOLEY 16FR INFECTION CONT

## (undated) DEVICE — SEE MEDLINE ITEM 152622

## (undated) DEVICE — GLOVE BIOGEL SKINSENSE PI 6.5

## (undated) DEVICE — DRESSING WND ADH PRIMAPORE 10

## (undated) DEVICE — TAPE SURG MEDIPORE 6X72IN

## (undated) DEVICE — LUBRICANT SURGILUBE 2 OZ

## (undated) DEVICE — TRAY DRY SKIN SCRUB PREP